# Patient Record
Sex: FEMALE | Race: WHITE | NOT HISPANIC OR LATINO | Employment: OTHER | URBAN - METROPOLITAN AREA
[De-identification: names, ages, dates, MRNs, and addresses within clinical notes are randomized per-mention and may not be internally consistent; named-entity substitution may affect disease eponyms.]

---

## 2017-03-31 ENCOUNTER — OFFICE VISIT (OUTPATIENT)
Dept: FAMILY MEDICINE | Facility: CLINIC | Age: 62
End: 2017-03-31
Payer: COMMERCIAL

## 2017-03-31 VITALS
DIASTOLIC BLOOD PRESSURE: 68 MMHG | WEIGHT: 165 LBS | HEART RATE: 87 BPM | HEIGHT: 63 IN | SYSTOLIC BLOOD PRESSURE: 124 MMHG | RESPIRATION RATE: 18 BRPM | TEMPERATURE: 98.4 F | BODY MASS INDEX: 29.23 KG/M2

## 2017-03-31 DIAGNOSIS — G89.29 CHRONIC RIGHT SHOULDER PAIN: Primary | ICD-10-CM

## 2017-03-31 DIAGNOSIS — E78.5 HYPERLIPIDEMIA LDL GOAL <130: ICD-10-CM

## 2017-03-31 DIAGNOSIS — M25.511 CHRONIC RIGHT SHOULDER PAIN: Primary | ICD-10-CM

## 2017-03-31 PROCEDURE — 99213 OFFICE O/P EST LOW 20 MIN: CPT | Performed by: PHYSICIAN ASSISTANT

## 2017-03-31 NOTE — PROGRESS NOTES
SUBJECTIVE:                                                    Nu Taylor is a 61 year old female who presents to clinic today for the following health issues:      Joint Pain     Onset: years    Description:   Location: rt shoulder  Character: ache    Intensity: mild    Progression of Symptoms: worse past 6 months    Accompanying Signs & Symptoms:  Other symptoms: radiation of pain to arm and fingers after using arm, tingling in arm   History:   Previous similar pain: no       Precipitating factors:   Trauma or overuse: YES-overuse-typing; patient jumped off fence grabbed fence with arm couple years ago    Alleviating factors:  Improved by: heat       Therapies Tried and outcome: tylenol      Problem list and histories reviewed & adjusted, as indicated.  Additional history: as documented    Patient Active Problem List   Diagnosis     Actinic keratosis     Essential hypertension     Other abnormal heart sounds     Hallux rigidus     Pain in joint, shoulder region     Hyperlipidemia LDL goal <130     Advanced directives, counseling/discussion     HTN, goal below 140/90     Mitral valve stenosis, mild     Urinary frequency     DDD (degenerative disc disease), cervical     Seasonal allergic rhinitis     Trigeminal neuralgia     Past Surgical History:   Procedure Laterality Date     C LIGATION,FALLOPIAN TUBE W/       LAPAROSCOPIC SUSPENSION BLADDER NECK         Social History   Substance Use Topics     Smoking status: Never Smoker     Smokeless tobacco: Never Used     Alcohol use 0.0 oz/week     0 Standard drinks or equivalent per week      Comment: occational drink     Family History   Problem Relation Age of Onset     Family History Negative No family hx of      C.A.D. No family hx of      Cancer - colorectal No family hx of      DIABETES Son      type 1         Current Outpatient Prescriptions   Medication Sig Dispense Refill     OXcarbazepine (TRILEPTAL) 300 MG tablet Take 1 tablet (300 mg) by mouth 4  "times daily 90 tablet 3     amLODIPine-benazepril (LOTREL) 5-20 MG per capsule Take 1 capsule by mouth 2 times daily 180 capsule 3     simvastatin (ZOCOR) 20 MG tablet Take 1 tablet (20 mg) by mouth every evening 90 tablet 3     Multiple Vitamins-Minerals (CENTRUM PO)        Allergies   Allergen Reactions     No Known Drug Allergies      BP Readings from Last 3 Encounters:   03/31/17 124/68   09/08/16 125/71   04/11/16 134/80    Wt Readings from Last 3 Encounters:   03/31/17 165 lb (74.8 kg)   09/08/16 162 lb (73.5 kg)   04/11/16 162 lb 6.4 oz (73.7 kg)                    Reviewed and updated as needed this visit by clinical staff  Tobacco  Allergies  Meds  Problems       Reviewed and updated as needed this visit by Provider  Allergies  Meds  Problems         ROS:  Constitutional, msk, neuro, cardiovascular, pulmonary systems are negative, except as otherwise noted.    OBJECTIVE:                                                    /68 (BP Location: Right arm, Patient Position: Chair, Cuff Size: Adult Regular)  Pulse 87  Temp 98.4  F (36.9  C) (Oral)  Resp 18  Ht 5' 3\" (1.6 m)  Wt 165 lb (74.8 kg)  LMP 10/22/2008  BMI 29.23 kg/m2  Body mass index is 29.23 kg/(m^2).  GENERAL APPEARANCE: healthy, alert and no distress  ORTHO:   SHOULDER Exam-Right   Inspection: no swelling, no bruising, no discoloration, no obvious deformity, no asymmetry, no glenohumeral joint anterior bulge, no distal clavicle elevation, no muscle atrophy, no scapular winging   Tenderness of: SC joint- no , clavicle(prox-mid)- no , clavicle-(mid-distal)- no , AC joint- no , acromion- no , anterior capsule- YES, prox bicep tendon- YES, greater tuberosity- no , prox humerus- no , supraspinatous- no , infraspinatous- no , superior trapezious- no , rhomboids- no    Range of Motion: Active- forward flexion- 120 degrees and painful, abduction- 100 degrees and painful, external rotation- normal, internal rotation- normal  Range of Motion: " Passive- forward flexion- normal, abduction- normal, external rotation- normal, internal rotation- normal   Strength: forward flexion- 5/5, painful, abduction- 5/5, painful, internal rotation- 5/5, external rotation- 4/5 and bicep- full   Special tests: Neers- negative, Ingram(supraspinatous)- negative, cross arm adduction- negative, Speeds- negative, lift off-negative, empty can-negative.         PSYCH: mentation appears normal and affect normal/bright    Diagnostic Test Results:  none      ASSESSMENT/PLAN:                                                      (M25.511,  G89.29) Chronic right shoulder pain  (primary encounter diagnosis)  Comment: has been present for years. Was at one time thought to be radicular from cervical region. MRI was obtained showing DDD. Patient's main complaint however is her shoulder. No imaging has been done of shoulder in past via MRI and with worsening symptoms after jumping a fence, a chronic rotator cuff tear vs nerve impingement in shoulder is possible though strengths are normal. Given chronic course, will obtain MRI of shoulder. Of note chronic impingement syndrome vs tendonitis is also possible. Pending MRI findings, joint injection may be helpful as well.   Plan: MR Shoulder Right w/o Contrast              Follow up: pending MRI results.     Kavon Renee PA-C  [facility]

## 2017-03-31 NOTE — MR AVS SNAPSHOT
After Visit Summary   3/31/2017    Nu Taylor    MRN: [id number]           Patient Information     Date Of Birth          1955        Visit Information        Provider Department      3/31/2017 2:15 PM Kavon Renee PA-C [facility]        Today's Diagnoses     Chronic right shoulder pain    -  1      Care Instructions      Understanding Rotator Cuff Injuries  The rotator cuff is a team of muscles and connecting tendons in the shoulder. It attaches your upper arm to your shoulder blade. Your rotator cuff helps you reach, throw, push, pull, and lift. Without it, your shoulder can't do its job properly.        Overuse tendonitis is irritation, bruising, or fraying of the rotator cuff.       A healthy rotator cuff  A healthy rotator cuff gives your shoulder flexibility and control. The rotator cuff holds your upper arm bone (humerus) in your shoulder socket (glenoid). It also helps move the shoulder.  A damaged rotator cuff  Pain and weakness told you that something was wrong with your shoulder. Now you know it s a rotator cuff problem. Rotator cuff tendons can become damaged or inflamed. This is called tendonitis. Possible causes include:    Irritation from overuse    Bursal inflammation (bursitis)    Pinching (impingement)    Calcium deposits (calcification)    Tears in the tendon.  Getting your shoulder healthy again  Care for your shoulder will most likely begin with nonsurgical treatments. You may start with simple rest. If needed, you may have injections that decrease inflammation and pain. Your healthcare provider will tell you how often you may need these treatments. If rest and injections relieve your pain, you will be given an exercise program. This will help restore your shoulder s strength and function. If your pain continues, your healthcare provider may suggest surgery to repair the rotator cuff.    6469-5221 [employer]. [address] [place] [address]  Road, [place] [address]. All rights reserved. This information is not intended as a substitute for professional medical care. Always follow your healthcare professional's instructions.        Shoulder Impingement Syndrome  The rotator cuff is a group of muscles and tendons that surround the shoulder joint. These muscles and tendons hold the arm in its joint. They help the shoulder turn. The rotator cuff muscles and tendons can become irritated from repeated rubbing against the shoulder bone. This is called shoulder impingement syndrome or rotator cuff tendonitis.    If your case is mild, you may only need to rest the shoulder and then do certain exercises to strengthen the muscles. You can also take anti-inflammatory medicines. Steroid injections into the shoulder can ease inflammation. But you can have only a limited number of these. If the condition gets worse, your shoulder muscles may become thin and weak. This can lead to a rotator cuff tear.  Symptoms of shoulder impingement syndrome may include:    Shoulder pain that gets worse when you raise your arm overhead    Weakness of the shoulder muscles when you use your arm overhead    Popping and clicking when you move your shoulder    Shoulder pain that wakes you up at night when you sleep on the affected shoulder    Sudden pain in your shoulder when you lift or reach up  Home Care  Follow these tips to take care of yourself at home:    Avoid activities that make your pain worse. These include raising your arms overhead, repeating the same motion over and over, or lifting heavy objects.    Don t hold your arm in one position for a long time. Keep it moving.    Put an ice pack on the sore area for 20 minutes every 1 to 2 hours for the first day. You can make an ice pack by putting ice cubes in a plastic bag. Wrap the bag in a towel before putting it on your shoulder. You should use the ice packs 3 to 4 times a day for the next 2 days. Continue using the ice to  relieve of pain and swelling.    You may take acetaminophen or ibuprofen to control pain, unless another medicine was prescribed. If prednisone was prescribed, don t take ibuprofen-type medicines. If you have chronic liver or kidney disease or ever had a stomach ulcer or GI bleeding, talk with your doctor before using these medicines.    After your symptoms ease, you may get physical therapy or start a home exercise program. This can strengthen your shoulder muscles and help your range of motion. Talk with your doctor about what is best for your condition.  Follow-up care  Follow up with your doctor.  When to seek medical care  Get prompt medical attention if any of these occur:    Shoulder pain that gets worse and wakes you up at night    Your shoulder or arm swells    Numbness, tingling, or pain that travels down the arm to the hand    Loss of shoulder strength       4491-0949 [employer]. [address] [place] [address]. All rights reserved. This information is not intended as a substitute for professional medical care. Always follow your healthcare professional's instructions.              Follow-ups after your visit        Future tests that were ordered for you today     Open Future Orders        Priority Expected Expires Ordered    MR Shoulder Right w/o Contrast Routine  3/31/2018 3/31/2017            Who to contact     If you have questions or need follow up information about today's clinic visit or your schedule please contact [facility] directly at [phone].  Normal or non-critical lab and imaging results will be communicated to you by MyChart, letter or phone within 4 business days after the clinic has received the results. If you do not hear from us within 7 days, please contact the clinic through MyChart or phone. If you have a critical or abnormal lab result, we will notify you by phone as soon as possible.  Submit refill requests through [employer] or call  "your pharmacy and they will forward the refill request to us. Please allow 3 business days for your refill to be completed.          Additional Information About Your Visit        [employer] Information     [employer] gives you secure access to your electronic health record. If you see a primary care provider, you can also send messages to your care team and make appointments. If you have questions, please call your primary care clinic.  If you do not have a primary care provider, please call [phone] and they will assist you.        Care EveryWhere ID     This is your Care EveryWhere ID. This could be used by other organizations to access your [facility] medical records  [id number]        Your Vitals Were     Pulse Temperature Respirations Height Last Period BMI (Body Mass Index)    87 98.4  F (36.9  C) (Oral) 18 5' 3\" (1.6 m) 10/22/2008 29.23 kg/m2       Blood Pressure from Last 3 Encounters:   03/31/17 124/68   09/08/16 125/71   04/11/16 134/80    Weight from Last 3 Encounters:   03/31/17 165 lb (74.8 kg)   09/08/16 162 lb (73.5 kg)   04/11/16 162 lb 6.4 oz (73.7 kg)               Primary Care Provider Office Phone # Fax #    Gloria Antonio [phone]       [facility] [address] [facility] [address]        Thank you!     Thank you for choosing [facility]  for your care. Our goal is always to provide you with excellent care. Hearing back from our patients is one way we can continue to improve our services. Please take a few minutes to complete the written survey that you may receive in the mail after your visit with us. Thank you!             Your Updated Medication List - Protect others around you: Learn how to safely use, store and throw away your medicines at www.disposemymeds.org.          This list is accurate as of: 3/31/17  3:21 PM.  Always use your most recent med list.                   Brand Name Dispense Instructions for use    amLODIPine-benazepril 5-20 " MG per capsule    LOTREL    180 capsule    Take 1 capsule by mouth 2 times daily       CENTRUM PO          simvastatin 20 MG tablet    ZOCOR    90 tablet    Take 1 tablet (20 mg) by mouth every evening       TRILEPTAL 300 MG tablet   Generic drug:  OXcarbazepine     90 tablet    Take 1 tablet (300 mg) by mouth 4 times daily

## 2017-03-31 NOTE — NURSING NOTE
"Chief Complaint   Patient presents with     Shoulder       Initial /68 (BP Location: Right arm, Patient Position: Chair, Cuff Size: Adult Regular)  Pulse 87  Temp 98.4  F (36.9  C) (Oral)  Resp 18  Ht 5' 3\" (1.6 m)  Wt 165 lb (74.8 kg)  LMP 10/22/2008  BMI 29.23 kg/m2 Estimated body mass index is 29.23 kg/(m^2) as calculated from the following:    Height as of this encounter: 5' 3\" (1.6 m).    Weight as of this encounter: 165 lb (74.8 kg).  Medication Reconciliation: complete    "

## 2017-03-31 NOTE — PATIENT INSTRUCTIONS
Understanding Rotator Cuff Injuries  The rotator cuff is a team of muscles and connecting tendons in the shoulder. It attaches your upper arm to your shoulder blade. Your rotator cuff helps you reach, throw, push, pull, and lift. Without it, your shoulder can't do its job properly.        Overuse tendonitis is irritation, bruising, or fraying of the rotator cuff.       A healthy rotator cuff  A healthy rotator cuff gives your shoulder flexibility and control. The rotator cuff holds your upper arm bone (humerus) in your shoulder socket (glenoid). It also helps move the shoulder.  A damaged rotator cuff  Pain and weakness told you that something was wrong with your shoulder. Now you know it s a rotator cuff problem. Rotator cuff tendons can become damaged or inflamed. This is called tendonitis. Possible causes include:    Irritation from overuse    Bursal inflammation (bursitis)    Pinching (impingement)    Calcium deposits (calcification)    Tears in the tendon.  Getting your shoulder healthy again  Care for your shoulder will most likely begin with nonsurgical treatments. You may start with simple rest. If needed, you may have injections that decrease inflammation and pain. Your healthcare provider will tell you how often you may need these treatments. If rest and injections relieve your pain, you will be given an exercise program. This will help restore your shoulder s strength and function. If your pain continues, your healthcare provider may suggest surgery to repair the rotator cuff.    2787-7162 [employer]. [address] [place] [address]. All rights reserved. This information is not intended as a substitute for professional medical care. Always follow your healthcare professional's instructions.        Shoulder Impingement Syndrome  The rotator cuff is a group of muscles and tendons that surround the shoulder joint. These muscles and tendons hold the arm in its joint. They help the  shoulder turn. The rotator cuff muscles and tendons can become irritated from repeated rubbing against the shoulder bone. This is called shoulder impingement syndrome or rotator cuff tendonitis.    If your case is mild, you may only need to rest the shoulder and then do certain exercises to strengthen the muscles. You can also take anti-inflammatory medicines. Steroid injections into the shoulder can ease inflammation. But you can have only a limited number of these. If the condition gets worse, your shoulder muscles may become thin and weak. This can lead to a rotator cuff tear.  Symptoms of shoulder impingement syndrome may include:    Shoulder pain that gets worse when you raise your arm overhead    Weakness of the shoulder muscles when you use your arm overhead    Popping and clicking when you move your shoulder    Shoulder pain that wakes you up at night when you sleep on the affected shoulder    Sudden pain in your shoulder when you lift or reach up  Home Care  Follow these tips to take care of yourself at home:    Avoid activities that make your pain worse. These include raising your arms overhead, repeating the same motion over and over, or lifting heavy objects.    Don t hold your arm in one position for a long time. Keep it moving.    Put an ice pack on the sore area for 20 minutes every 1 to 2 hours for the first day. You can make an ice pack by putting ice cubes in a plastic bag. Wrap the bag in a towel before putting it on your shoulder. You should use the ice packs 3 to 4 times a day for the next 2 days. Continue using the ice to relieve of pain and swelling.    You may take acetaminophen or ibuprofen to control pain, unless another medicine was prescribed. If prednisone was prescribed, don t take ibuprofen-type medicines. If you have chronic liver or kidney disease or ever had a stomach ulcer or GI bleeding, talk with your doctor before using these medicines.    After your symptoms ease, you may get  physical therapy or start a home exercise program. This can strengthen your shoulder muscles and help your range of motion. Talk with your doctor about what is best for your condition.  Follow-up care  Follow up with your doctor.  When to seek medical care  Get prompt medical attention if any of these occur:    Shoulder pain that gets worse and wakes you up at night    Your shoulder or arm swells    Numbness, tingling, or pain that travels down the arm to the hand    Loss of shoulder strength       8914-7201 [employer]. [address] [place] [address]. All rights reserved. This information is not intended as a substitute for professional medical care. Always follow your healthcare professional's instructions.

## 2017-04-03 RX ORDER — SIMVASTATIN 20 MG
TABLET ORAL
Qty: 90 TABLET | Refills: 0 | Status: SHIPPED | OUTPATIENT
Start: 2017-04-03 | End: 2017-07-01

## 2017-04-06 ENCOUNTER — HOSPITAL ENCOUNTER (OUTPATIENT)
Dept: MRI IMAGING | Facility: CLINIC | Age: 62
Discharge: HOME | End: 2017-04-06
Attending: PHYSICIAN ASSISTANT | Admitting: PHYSICIAN ASSISTANT
Payer: COMMERCIAL

## 2017-04-06 DIAGNOSIS — G89.29 CHRONIC RIGHT SHOULDER PAIN: ICD-10-CM

## 2017-04-06 DIAGNOSIS — M25.511 CHRONIC RIGHT SHOULDER PAIN: ICD-10-CM

## 2017-04-06 PROCEDURE — 73221 MRI JOINT UPR EXTREM W/O DYE: CPT | Mod: RT

## 2017-04-07 DIAGNOSIS — G50.0 TRIGEMINAL NEURALGIA: ICD-10-CM

## 2017-04-07 DIAGNOSIS — M75.121 COMPLETE TEAR OF RIGHT ROTATOR CUFF: Primary | ICD-10-CM

## 2017-04-08 NOTE — TELEPHONE ENCOUNTER
OXcarbazepine (TRILEPTAL) 300 MG tablet  Sig: Take 1 tablet (300 mg) by mouth 4 times daily    Last Written Prescription Date: 9/8/16  Last Fill Quantity: 90,  # refills: 3   Last Office Visit with FMHOWIE, JASMIN or [facility] prescribing provider:  3/31/2017                                              JAYCOB Mann  April 8, 2017  9:18 AM

## 2017-04-10 NOTE — TELEPHONE ENCOUNTER
Not sure why is she on these medications? And who is folllowing up with her about these medications?  Gloria Antonio MD  [facility]  [phone]  r

## 2017-04-14 ENCOUNTER — TELEPHONE (OUTPATIENT)
Dept: FAMILY MEDICINE | Facility: CLINIC | Age: 62
End: 2017-04-14

## 2017-04-14 RX ORDER — OXCARBAZEPINE 300 MG/1
TABLET, FILM COATED ORAL
Qty: 90 TABLET | Refills: 0 | Status: SHIPPED | OUTPATIENT
Start: 2017-04-14 | End: 2018-07-31

## 2017-04-14 NOTE — TELEPHONE ENCOUNTER
Pt calls, discussed recent MRI, discussed Mychart, email address entered incorrectly, update, sent test response, pt will call ortho now, controlling pain with inactivity, heat/cold and tylenol, will call if problem getting ortho appointment, [facility], pt has appointment Monday  Mildred Huang RN, BSN  Message handled by Nurse Triage.

## 2017-04-14 NOTE — TELEPHONE ENCOUNTER
Pt calls, takes for trigeminal neuralgia, [facility] has refilled, see past refills, confused about [facility] response as thought he was, routed back to [facility], please advise refill, inform pt when final    Mildred Huang RN, BSN  Message handled by Nurse Triage.

## 2017-04-17 ENCOUNTER — OFFICE VISIT (OUTPATIENT)
Dept: ORTHOPEDICS | Facility: CLINIC | Age: 62
End: 2017-04-17
Payer: COMMERCIAL

## 2017-04-17 VITALS
SYSTOLIC BLOOD PRESSURE: 120 MMHG | HEIGHT: 63 IN | DIASTOLIC BLOOD PRESSURE: 74 MMHG | WEIGHT: 165 LBS | BODY MASS INDEX: 29.23 KG/M2

## 2017-04-17 DIAGNOSIS — M75.101 ROTATOR CUFF TEAR ARTHROPATHY, RIGHT: Primary | ICD-10-CM

## 2017-04-17 DIAGNOSIS — M12.811 ROTATOR CUFF TEAR ARTHROPATHY, RIGHT: Primary | ICD-10-CM

## 2017-04-17 PROCEDURE — 99204 OFFICE O/P NEW MOD 45 MIN: CPT | Performed by: ORTHOPAEDIC SURGERY

## 2017-04-17 NOTE — PROGRESS NOTES
HISTORY OF PRESENT ILLNESS:    Nu Taylor is a 61 year old female who is seen in consultation at the request of Jens Renee PA-C for right shoulder pain    Present symptoms: Pt states shoulder pain has been present for about 3 months, pt states about a year ago jumped a 4 ft fence and grabbed the fence with her right arm, started developing pain in the shoulder at that time.  Pt states she has burning pain in the shoulder, will feel hot to touch.  Pt states keyboarding and using a mouse will increase pain. Activities such as writing and cutting people's hair have increased her pain levelPt states pain is over the anterior shoulder and down into the upper arm.  Pt states side lying has started to cause increased pain.  She is right-hand dominant.  Her pain is mostly in the superior aspect and sometimes goes into the trapezius and her neck.  No specific radiculopathy symptoms otherwise.    Treatments tried to this point: ice, heat, tylenol, MRI, rest  Orthopedic PMH: no ortho surgeries     Past Medical History:   Diagnosis Date     Allergic rhinitis due to other allergen      HTN (hypertension)      Hyperlipidemia LDL goal <130 2008       Past Surgical History:   Procedure Laterality Date     C LIGATION,FALLOPIAN TUBE W/       LAPAROSCOPIC SUSPENSION BLADDER NECK         Family History   Problem Relation Age of Onset     Family History Negative No family hx of      C.A.D. No family hx of      Cancer - colorectal No family hx of      DIABETES Son      type 1       Social History     Social History     Marital status:      Spouse name: Thomas     Number of children: 4     Years of education: N/A     Occupational History     bus  [employer]     Social History Main Topics     Smoking status: Never Smoker     Smokeless tobacco: Never Used     Alcohol use 0.0 oz/week     0 Standard drinks or equivalent per week      Comment: occational drink     Drug use: No      "Sexual activity: Yes     Partners: Male     Other Topics Concern      Service No     Blood Transfusions No     Caffeine Concern No     Occupational Exposure No     Hobby Hazards No     Sleep Concern Yes     Stress Concern Yes     Weight Concern No     Special Diet Yes     low sodium     Back Care No     Exercise Yes     2 x per week     Bike Helmet Yes     Seat Belt Yes     Self-Exams Yes     Social History Narrative       Current Outpatient Prescriptions   Medication Sig Dispense Refill     OXcarbazepine (TRILEPTAL) 300 MG tablet TAKE ONE TABLET BY MOUTH THREE TIMES DAILY 90 tablet 0     simvastatin (ZOCOR) 20 MG tablet TAKE 1 TABLET BY MOUTH ONCE DAILY IN THE EVENING 90 tablet 0     OXcarbazepine (TRILEPTAL) 300 MG tablet Take 1 tablet (300 mg) by mouth 4 times daily 90 tablet 3     amLODIPine-benazepril (LOTREL) 5-20 MG per capsule Take 1 capsule by mouth 2 times daily 180 capsule 3     Multiple Vitamins-Minerals (CENTRUM PO)          Allergies   Allergen Reactions     No Known Drug Allergies        REVIEW OF SYSTEMS:  CONSTITUTIONAL:  NEGATIVE for fever, chills, change in weight  INTEGUMENTARY/SKIN:  Eczema. NEGATIVE for worrisome rashes, moles or lesions  EYES:  NEGATIVE for vision changes or irritation  ENT/MOUTH:  NEGATIVE for ear, mouth and throat problems  RESP:  NEGATIVE for significant cough or SOB  BREAST:  NEGATIVE for masses, tenderness or discharge  CV:  Hypertension. NEGATIVE for chest pain, palpitations or peripheral edema  GI:  NEGATIVE for nausea, abdominal pain, heartburn, or change in bowel habits  :  Urinary frequency  MUSCULOSKELETAL:  See HPI above  NEURO:  NEGATIVE for weakness, dizziness or paresthesias  ENDOCRINE:  NEGATIVE for temperature intolerance, skin/hair changes  HEME/ALLERGY/IMMUNE:  NEGATIVE for bleeding problems  PSYCHIATRIC:  NEGATIVE for changes in mood or affect      PHYSICAL EXAM:  Ht 5' 3\" (1.6 m)  Wt 165 lb (74.8 kg)  LMP 10/22/2008  BMI 29.23 kg/m2  Body mass " index is 29.23 kg/(m^2).   GENERAL APPEARANCE: healthy, alert and no distress   HEENT: No apparent thyroid megaly. Clear sclera with normal ocular movement  RESPIRATORY: No labored breathing  SKIN: no suspicious lesions or rashes  NEURO: Normal strength and tone, mentation intact and speech normal  VASCULAR: Good pulses, and capillary refill   LYMPH: no lymphadenopathy   PSYCH:  mentation appears normal and affect normal/bright    MUSCULOSKELETAL:  Normal gait  Normal neck movement  Full active range of motion in all directions  Positive impingement sign, right;  negative on the left  Intact internal rotation, right  Negative belly press  Significant weakness of external rotation against resistance, right  Flexion and extension of the elbow is intact in terms of strength  No noticeable crepitus  Distal neurovascular status is intact     ASSESSMENT:  Chronic right shoulder rotator cuff tear arthropathy        PLAN:  We visualized images of MRI scan from April 6, 2017 and findings were thoroughly explained.  The nature of rotator cuff tear arthropathy and the treatment options of further observation, physical therapy, cortisone injection as well as possible reverse total shoulder arthroplasty were thoroughly explained.  Potential benefits as well as risks of the surgery were explained.  Given the fact that her pain is not severe and not constant, no immediate surgical intervention was felt to be needed. However, she understands that ultimate decision to proceed with reverse total shoulder arthroplasty would be up to her.  She concern he try physical therapy and intermittent cortisone injections before she makes up her mind about surgery.  Written information regarding rotator cuff tear arthropathy was provided today      Imaging Interpretation:     Recent Results (from the past 744 hour(s))   MR Shoulder Right w/o Contrast    Narrative    MRI RIGHT SHOULDER WITHOUT CONTRAST April 6, 2017 7:10 PM    HISTORY: Two months  of increasing right shoulder pain.    COMPARISON: Radiographs on 7/3/2013.    TECHNIQUE: Coronal T1, T2, and T2 fat-suppressed, sagittal T2, and  transverse proton density and T2-weighted images were obtained through  the right shoulder.    FINDINGS:    Osseous acromion outlet: There is a type II configuration of the  acromion with no significant lateral and moderate anterior  downsloping. There are mild degenerative changes of the  acromioclavicular joint. The acromion downsloping results in  moderately prominent loss of space between the humeral head and the  acromion. No os acromiale.    Rotator cuff: There is a complete full-thickness tear of the entire  distal infraspinatus tendon as well as a large portion of the adjacent  supraspinatus tendon. These are torn at their insertion into the  greater tuberosity. The infraspinatus tendon is retracted to the level  of the glenoid while the torn portion of the supraspinatus extends to  the apex of the humeral head. The subscapularis and teres minor  muscles and tendons are intact and unremarkable. There appears to be  fatty atrophy of the infraspinatus muscle.     Labrum: No tear or other labral pathology is demonstrated.    Biceps tendon: The biceps tendon is in the bicipital groove. It is  intact and demonstrates normal signal.    Osseous structures and cartilaginous surfaces: No fracture or osseous  lesion is seen. No abnormal marrow signal intensity is identified.  There are moderate degenerative changes of the glenohumeral  articulation including predominately grade 2 chondromalacia throughout  the majority of the joint space with some grade 3 chondromalacia  superiorly. There is also mild superior subluxation of the humeral  head secondary to the rotator cuff tear.    Additional findings: No joint effusion is demonstrated. There is a  small amount of fluid in the subacromial space. The adjacent soft  tissues are unremarkable.      Impression    IMPRESSION:  1.  Large full-thickness tears of the entire distal infraspinatus  tendon and a large portion of the adjacent supraspinatus tendon. There  is fatty atrophy of the infraspinatus muscle.  2. Moderate degenerative changes of the glenohumeral joint and mild  degenerative changes of the acromioclavicular joint.    MD Elías BROWN MD  Department of Orthopedic Surgery        Disclaimer: This note consists of symbols derived from keyboarding, dictation and/or voice recognition software. As a result, there may be errors in the script that have gone undetected. Please consider this when interpreting information found in this chart.

## 2017-04-17 NOTE — MR AVS SNAPSHOT
"              After Visit Summary   4/17/2017    Nu Taylor    MRN: [id number]           Patient Information     Date Of Birth          1955        Visit Information        Provider Department      4/17/2017 1:00 PM Elías Clements MD [facility]        Today's Diagnoses     Rotator cuff tear arthropathy, right    -  1       Follow-ups after your visit        Who to contact     If you have questions or need follow up information about today's clinic visit or your schedule please contact [facility] directly at [phone].  Normal or non-critical lab and imaging results will be communicated to you by [employer], letter or phone within 4 business days after the clinic has received the results. If you do not hear from us within 7 days, please contact the clinic through Meet[employer] or phone. If you have a critical or abnormal lab result, we will notify you by phone as soon as possible.  Submit refill requests through [employer] or call your pharmacy and they will forward the refill request to us. Please allow 3 business days for your refill to be completed.          Additional Information About Your Visit        MyChart Information     [employer] gives you secure access to your electronic health record. If you see a primary care provider, you can also send messages to your care team and make appointments. If you have questions, please call your primary care clinic.  If you do not have a primary care provider, please call [phone] and they will assist you.        Care EveryWhere ID     This is your Care EveryWhere ID. This could be used by other organizations to access your [place] medical records  [id number]        Your Vitals Were     Height Last Period BMI (Body Mass Index)             5' 3\" (1.6 m) 10/22/2008 29.23 kg/m2          Blood Pressure from Last 3 Encounters:   04/17/17 120/74   03/31/17 124/68   09/08/16 125/71    Weight from Last 3 Encounters:   04/17/17 " 165 lb (74.8 kg)   03/31/17 165 lb (74.8 kg)   09/08/16 162 lb (73.5 kg)              Today, you had the following     No orders found for display       Primary Care Provider Office Phone # Fax #    Gloria Antonio [phone]       [facility] [address] [address] [facility] [address]        Thank you!     Thank you for choosing [facility]  for your care. Our goal is always to provide you with excellent care. Hearing back from our patients is one way we can continue to improve our services. Please take a few minutes to complete the written survey that you may receive in the mail after your visit with us. Thank you!             Your Updated Medication List - Protect others around you: Learn how to safely use, store and throw away your medicines at www.disposemymeds.org.          This list is accurate as of: 4/17/17  1:45 PM.  Always use your most recent med list.                   Brand Name Dispense Instructions for use    amLODIPine-benazepril 5-20 MG per capsule    LOTREL    180 capsule    Take 1 capsule by mouth 2 times daily       CENTRUM PO          simvastatin 20 MG tablet    ZOCOR    90 tablet    TAKE 1 TABLET BY MOUTH ONCE DAILY IN THE EVENING       * TRILEPTAL 300 MG tablet   Generic drug:  OXcarbazepine     90 tablet    Take 1 tablet (300 mg) by mouth 4 times daily       * OXcarbazepine 300 MG tablet    TRILEPTAL    90 tablet    TAKE ONE TABLET BY MOUTH THREE TIMES DAILY       * Notice:  This list has 2 medication(s) that are the same as other medications prescribed for you. Read the directions carefully, and ask your doctor or other care provider to review them with you.

## 2017-04-17 NOTE — LETTER
2017       RE: Nu Taylor  [address] [facility] [address]           Dear Colleague,    Thank you for referring your patient, Nu Taylor, to the [facility]. Please see a copy of my visit note below.    HISTORY OF PRESENT ILLNESS:    Nu Taylor is a 61 year old female who is seen in consultation at the request of eJns Renee PA-C for right shoulder pain    Present symptoms: Pt states shoulder pain has been present for about 3 months, pt states about a year ago jumped a 4 ft fence and grabbed the fence with her right arm, started developing pain in the shoulder at that time.  Pt states she has burning pain in the shoulder, will feel hot to touch.  Pt states keyboarding and using a mouse will increase pain. Activities such as writing and cutting people's hair have increased her pain levelPt states pain is over the anterior shoulder and down into the upper arm.  Pt states side lying has started to cause increased pain.  She is right-hand dominant.  Her pain is mostly in the superior aspect and sometimes goes into the trapezius and her neck.  No specific radiculopathy symptoms otherwise.    Treatments tried to this point: ice, heat, tylenol, MRI, rest  Orthopedic PMH: no ortho surgeries     Past Medical History:   Diagnosis Date     Allergic rhinitis due to other allergen      HTN (hypertension)      Hyperlipidemia LDL goal <130 2008       Past Surgical History:   Procedure Laterality Date     C LIGATION,FALLOPIAN TUBE W/       LAPAROSCOPIC SUSPENSION BLADDER NECK         Family History   Problem Relation Age of Onset     Family History Negative No family hx of      C.A.D. No family hx of      Cancer - colorectal No family hx of      DIABETES Son      type 1       Social History     Social History     Marital status:      Spouse name: Thomas     Number of children: 4     Years of education: N/A     Occupational History     bus  [employer]     Social History Main Topics     Smoking status: Never Smoker     Smokeless tobacco: Never Used     Alcohol use 0.0 oz/week     0 Standard drinks or equivalent per week      Comment: occational drink     Drug use: No     Sexual activity: Yes     Partners: Male     Other Topics Concern      Service No     Blood Transfusions No     Caffeine Concern No     Occupational Exposure No     Hobby Hazards No     Sleep Concern Yes     Stress Concern Yes     Weight Concern No     Special Diet Yes     low sodium     Back Care No     Exercise Yes     2 x per week     Bike Helmet Yes     Seat Belt Yes     Self-Exams Yes     Social History Narrative       Current Outpatient Prescriptions   Medication Sig Dispense Refill     OXcarbazepine (TRILEPTAL) 300 MG tablet TAKE ONE TABLET BY MOUTH THREE TIMES DAILY 90 tablet 0     simvastatin (ZOCOR) 20 MG tablet TAKE 1 TABLET BY MOUTH ONCE DAILY IN THE EVENING 90 tablet 0     OXcarbazepine (TRILEPTAL) 300 MG tablet Take 1 tablet (300 mg) by mouth 4 times daily 90 tablet 3     amLODIPine-benazepril (LOTREL) 5-20 MG per capsule Take 1 capsule by mouth 2 times daily 180 capsule 3     Multiple Vitamins-Minerals (CENTRUM PO)          Allergies   Allergen Reactions     No Known Drug Allergies        REVIEW OF SYSTEMS:  CONSTITUTIONAL:  NEGATIVE for fever, chills, change in weight  INTEGUMENTARY/SKIN:  Eczema. NEGATIVE for worrisome rashes, moles or lesions  EYES:  NEGATIVE for vision changes or irritation  ENT/MOUTH:  NEGATIVE for ear, mouth and throat problems  RESP:  NEGATIVE for significant cough or SOB  BREAST:  NEGATIVE for masses, tenderness or discharge  CV:  Hypertension. NEGATIVE for chest pain, palpitations or peripheral edema  GI:  NEGATIVE for nausea, abdominal pain, heartburn, or change in bowel habits  :  Urinary frequency  MUSCULOSKELETAL:  See HPI above  NEURO:  NEGATIVE for weakness, dizziness or paresthesias  ENDOCRINE:  NEGATIVE for  "temperature intolerance, skin/hair changes  HEME/ALLERGY/IMMUNE:  NEGATIVE for bleeding problems  PSYCHIATRIC:  NEGATIVE for changes in mood or affect      PHYSICAL EXAM:  Ht 5' 3\" (1.6 m)  Wt 165 lb (74.8 kg)  LMP 10/22/2008  BMI 29.23 kg/m2  Body mass index is 29.23 kg/(m^2).   GENERAL APPEARANCE: healthy, alert and no distress   HEENT: No apparent thyroid megaly. Clear sclera with normal ocular movement  RESPIRATORY: No labored breathing  SKIN: no suspicious lesions or rashes  NEURO: Normal strength and tone, mentation intact and speech normal  VASCULAR: Good pulses, and capillary refill   LYMPH: no lymphadenopathy   PSYCH:  mentation appears normal and affect normal/bright    MUSCULOSKELETAL:  Normal gait  Normal neck movement  Full active range of motion in all directions  Positive impingement sign, right;  negative on the left  Intact internal rotation, right  Negative belly press  Significant weakness of external rotation against resistance, right  Flexion and extension of the elbow is intact in terms of strength  No noticeable crepitus  Distal neurovascular status is intact     ASSESSMENT:  Chronic right shoulder rotator cuff tear arthropathy        PLAN:  We visualized images of MRI scan from April 6, 2017 and findings were thoroughly explained.  The nature of rotator cuff tear arthropathy and the treatment options of further observation, physical therapy, cortisone injection as well as possible reverse total shoulder arthroplasty were thoroughly explained.  Potential benefits as well as risks of the surgery were explained.  Given the fact that her pain is not severe and not constant, no immediate surgical intervention was felt to be needed. However, she understands that ultimate decision to proceed with reverse total shoulder arthroplasty would be up to her.  She concern he try physical therapy and intermittent cortisone injections before she makes up her mind about surgery.  Written information " regarding rotator cuff tear arthropathy was provided today      Imaging Interpretation:     Recent Results (from the past 744 hour(s))   MR Shoulder Right w/o Contrast    Narrative    MRI RIGHT SHOULDER WITHOUT CONTRAST April 6, 2017 7:10 PM    HISTORY: Two months of increasing right shoulder pain.    COMPARISON: Radiographs on 7/3/2013.    TECHNIQUE: Coronal T1, T2, and T2 fat-suppressed, sagittal T2, and  transverse proton density and T2-weighted images were obtained through  the right shoulder.    FINDINGS:    Osseous acromion outlet: There is a type II configuration of the  acromion with no significant lateral and moderate anterior  downsloping. There are mild degenerative changes of the  acromioclavicular joint. The acromion downsloping results in  moderately prominent loss of space between the humeral head and the  acromion. No os acromiale.    Rotator cuff: There is a complete full-thickness tear of the entire  distal infraspinatus tendon as well as a large portion of the adjacent  supraspinatus tendon. These are torn at their insertion into the  greater tuberosity. The infraspinatus tendon is retracted to the level  of the glenoid while the torn portion of the supraspinatus extends to  the apex of the humeral head. The subscapularis and teres minor  muscles and tendons are intact and unremarkable. There appears to be  fatty atrophy of the infraspinatus muscle.     Labrum: No tear or other labral pathology is demonstrated.    Biceps tendon: The biceps tendon is in the bicipital groove. It is  intact and demonstrates normal signal.    Osseous structures and cartilaginous surfaces: No fracture or osseous  lesion is seen. No abnormal marrow signal intensity is identified.  There are moderate degenerative changes of the glenohumeral  articulation including predominately grade 2 chondromalacia throughout  the majority of the joint space with some grade 3 chondromalacia  superiorly. There is also mild superior  subluxation of the humeral  head secondary to the rotator cuff tear.    Additional findings: No joint effusion is demonstrated. There is a  small amount of fluid in the subacromial space. The adjacent soft  tissues are unremarkable.      Impression    IMPRESSION:  1. Large full-thickness tears of the entire distal infraspinatus  tendon and a large portion of the adjacent supraspinatus tendon. There  is fatty atrophy of the infraspinatus muscle.  2. Moderate degenerative changes of the glenohumeral joint and mild  degenerative changes of the acromioclavicular joint.    MD Elías BROWN MD  Department of Orthopedic Surgery        Disclaimer: This note consists of symbols derived from keyboarding, dictation and/or voice recognition software. As a result, there may be errors in the script that have gone undetected. Please consider this when interpreting information found in this chart.      Again, thank you for allowing me to participate in the care of your patient.        Sincerely,              Elías Clements MD

## 2017-04-17 NOTE — NURSING NOTE
"Chief Complaint   Patient presents with     Shoulder Pain     Right shoulder       Initial /74 (BP Location: Right arm, Patient Position: Chair, Cuff Size: Adult Regular)  Ht 5' 3\" (1.6 m)  Wt 165 lb (74.8 kg)  LMP 10/22/2008  BMI 29.23 kg/m2 Estimated body mass index is 29.23 kg/(m^2) as calculated from the following:    Height as of this encounter: 5' 3\" (1.6 m).    Weight as of this encounter: 165 lb (74.8 kg).  Medication Reconciliation: complete    "

## 2017-05-02 DIAGNOSIS — E78.5 HYPERLIPIDEMIA LDL GOAL <130: Primary | ICD-10-CM

## 2017-05-02 DIAGNOSIS — I10 ESSENTIAL HYPERTENSION: ICD-10-CM

## 2017-05-02 DIAGNOSIS — I10 HTN, GOAL BELOW 140/90: ICD-10-CM

## 2017-05-02 NOTE — LETTER
[address] [address]  Phone: [phone]    05/04/17    Nu Taylor  [address] [address] [place] [id number]    Dear Nu:     We recently received a call from your pharmacy requesting a refill of your medication.    A review of your chart indicates that an appointment is required with your provider for follow up exam and labs. Please call the clinic at [phone] to schedule your appointment.    We have authorized one refill of your medication to allow time for you to schedule your appointment.    Taking care of your health is important to us, and ongoing visits with your provider are vital to your care.  We look forward to seeing you in the near future.          Sincerely,      Gloria Antonio MD/Zara CHRISTOPHER RN

## 2017-05-03 NOTE — TELEPHONE ENCOUNTER
Amlodipine 5-20 mg       Last Written Prescription Date: 05/02/16  Last Fill Quantity: 180, # refills: 3    Last Office Visit with G, P or [facility] prescribing provider:  03/17/16 Dr. Antonio   Future Office Visit:        BP Readings from Last 3 Encounters:   04/17/17 120/74   03/31/17 124/68   09/08/16 125/71

## 2017-05-04 RX ORDER — AMLODIPINE AND BENAZEPRIL HYDROCHLORIDE 5; 20 MG/1; MG/1
1 CAPSULE ORAL 2 TIMES DAILY
Qty: 60 CAPSULE | Refills: 0 | Status: SHIPPED | OUTPATIENT
Start: 2017-05-04 | End: 2017-05-30

## 2017-05-04 NOTE — TELEPHONE ENCOUNTER
Medication is being filled for 1 time refill only due to:  Future labs ordered .. Patient needs to be seen because it has been more than one year since last visit. Letter sent to schedule appt and labs  Christine Simms RN, BS  Clinical Nurse Triage.

## 2017-05-30 ENCOUNTER — OFFICE VISIT (OUTPATIENT)
Dept: FAMILY MEDICINE | Facility: CLINIC | Age: 62
End: 2017-05-30
Payer: COMMERCIAL

## 2017-05-30 VITALS
HEART RATE: 86 BPM | RESPIRATION RATE: 16 BRPM | SYSTOLIC BLOOD PRESSURE: 118 MMHG | TEMPERATURE: 97.7 F | WEIGHT: 164 LBS | OXYGEN SATURATION: 96 % | DIASTOLIC BLOOD PRESSURE: 80 MMHG | BODY MASS INDEX: 29.05 KG/M2

## 2017-05-30 DIAGNOSIS — E78.5 HYPERLIPIDEMIA LDL GOAL <130: ICD-10-CM

## 2017-05-30 DIAGNOSIS — I10 HTN, GOAL BELOW 140/90: Primary | ICD-10-CM

## 2017-05-30 LAB
ERYTHROCYTE [DISTWIDTH] IN BLOOD BY AUTOMATED COUNT: 12 % (ref 10–15)
HCT VFR BLD AUTO: 38.6 % (ref 35–47)
HGB BLD-MCNC: 13.4 G/DL (ref 11.7–15.7)
MCH RBC QN AUTO: 31.5 PG (ref 26.5–33)
MCHC RBC AUTO-ENTMCNC: 34.7 G/DL (ref 31.5–36.5)
MCV RBC AUTO: 91 FL (ref 78–100)
PLATELET # BLD AUTO: 333 10E9/L (ref 150–450)
RBC # BLD AUTO: 4.25 10E12/L (ref 3.8–5.2)
WBC # BLD AUTO: 7.2 10E9/L (ref 4–11)

## 2017-05-30 PROCEDURE — 80053 COMPREHEN METABOLIC PANEL: CPT | Performed by: FAMILY MEDICINE

## 2017-05-30 PROCEDURE — 80061 LIPID PANEL: CPT | Performed by: FAMILY MEDICINE

## 2017-05-30 PROCEDURE — 36415 COLL VENOUS BLD VENIPUNCTURE: CPT | Performed by: FAMILY MEDICINE

## 2017-05-30 PROCEDURE — 99213 OFFICE O/P EST LOW 20 MIN: CPT | Performed by: NURSE PRACTITIONER

## 2017-05-30 PROCEDURE — 85027 COMPLETE CBC AUTOMATED: CPT | Performed by: FAMILY MEDICINE

## 2017-05-30 RX ORDER — AMLODIPINE AND BENAZEPRIL HYDROCHLORIDE 5; 20 MG/1; MG/1
1 CAPSULE ORAL 2 TIMES DAILY
Qty: 180 CAPSULE | Refills: 1 | Status: SHIPPED | OUTPATIENT
Start: 2017-05-30 | End: 2017-12-03

## 2017-05-30 NOTE — MR AVS SNAPSHOT
After Visit Summary   5/30/2017    Nu Taylor    MRN: [id number]           Patient Information     Date Of Birth          1955        Visit Information        Provider Department      5/30/2017 3:00 PM Kimberlyn Grady APRN CNP [facility]        Today's Diagnoses     HTN, goal below 140/90    -  1    Hyperlipidemia LDL goal <130           Follow-ups after your visit        Follow-up notes from your care team     Return in about 6 months (around 11/30/2017).      Who to contact     If you have questions or need follow up information about today's clinic visit or your schedule please contact [facility] directly at [phone].  Normal or non-critical lab and imaging results will be communicated to you by MyChart, letter or phone within 4 business days after the clinic has received the results. If you do not hear from us within 7 days, please contact the clinic through [employer] or phone. If you have a critical or abnormal lab result, we will notify you by phone as soon as possible.  Submit refill requests through [employer] or call your pharmacy and they will forward the refill request to us. Please allow 3 business days for your refill to be completed.          Additional Information About Your Visit        MyChart Information     [employer] gives you secure access to your electronic health record. If you see a primary care provider, you can also send messages to your care team and make appointments. If you have questions, please call your primary care clinic.  If you do not have a primary care provider, please call [phone] and they will assist you.        Care EveryWhere ID     This is your Care EveryWhere ID. This could be used by other organizations to access your [facility] medical records  [id number]        Your Vitals Were     Pulse Temperature Respirations Last Period Pulse Oximetry BMI (Body Mass Index)    86 97.7  F (36.5  C) (Oral) 16 10/22/2008  96% 29.05 kg/m2       Blood Pressure from Last 3 Encounters:   05/30/17 118/80   04/17/17 120/74   03/31/17 124/68    Weight from Last 3 Encounters:   05/30/17 164 lb (74.4 kg)   04/17/17 165 lb (74.8 kg)   03/31/17 165 lb (74.8 kg)              We Performed the Following     **CBC with platelets FUTURE 1yr     **Comprehensive metabolic panel FUTURE 1yr     **Lipid panel reflex to direct LDL FUTURE 1yr          Where to get your medicines      These medications were sent to [facility] [address] [address] [facility]  [address] [address] [address]     Phone:  [phone]     amLODIPine-benazepril 5-20 MG per capsule          Primary Care Provider Office Phone # Fax #    Gloria Antonio [phone]       [facility] [address] [facility] [address] [facility] [address]        Equal Access to Services     GIN GOLD AH: Hadii aad ku hadasho Soomaali, waaxda luqadaha, qaybta kaalmada adeegyada, waxay idiin hayharryn sai pearce . So [place] [phone].    ATENCIÓN: Si habla español, tiene a العلي disposición servicios gratuitos de asistencia lingüística. [facility] [phone].    We comply with applicable federal civil rights laws and [place] laws. We do not discriminate on the basis of race, color, national origin, age, disability sex, sexual orientation or gender identity.            Thank you!     Thank you for choosing [facility]  for your care. Our goal is always to provide you with excellent care. Hearing back from our patients is one way we can continue to improve our services. Please take a few minutes to complete the written survey that you may receive in the mail after your visit with us. Thank you!             Your Updated Medication List - Protect others around you: Learn how to safely use, store and throw away your medicines at www.disposemymeds.org.          This list is accurate as of: 5/30/17 11:59 PM.  Always use your most recent med list.                    Brand Name Dispense Instructions for use Diagnosis    amLODIPine-benazepril 5-20 MG per capsule    LOTREL    180 capsule    Take 1 capsule by mouth 2 times daily    HTN, goal below 140/90       CENTRUM PO           OXcarbazepine 300 MG tablet    TRILEPTAL    90 tablet    TAKE ONE TABLET BY MOUTH THREE TIMES DAILY    Trigeminal neuralgia       simvastatin 20 MG tablet    ZOCOR    90 tablet    TAKE 1 TABLET BY MOUTH ONCE DAILY IN THE EVENING    Hyperlipidemia LDL goal <130

## 2017-05-30 NOTE — NURSING NOTE
"Chief Complaint   Patient presents with     Recheck Medication       Initial /80 (BP Location: Right arm, Cuff Size: Adult Regular)  Pulse 86  Temp 97.7  F (36.5  C) (Oral)  Resp 16  Wt 164 lb (74.4 kg)  LMP 10/22/2008  SpO2 96%  BMI 29.05 kg/m2 Estimated body mass index is 29.05 kg/(m^2) as calculated from the following:    Height as of 4/17/17: 5' 3\" (1.6 m).    Weight as of this encounter: 164 lb (74.4 kg).  Medication Reconciliation: complete   Cheli Can MA    "

## 2017-05-30 NOTE — PROGRESS NOTES
HPI    SUBJECTIVE:                                                    Nu Taylor is a 61 year old female who presents to clinic today for the following health issues:    Pt is here for HTN and Cholesterol medication refills.  She has been taking medication as directed.  No side effects to the medications.  She is on a regular diet, no added salt.       Problem list and histories reviewed & adjusted, as indicated.  Additional history: as documented    Current Outpatient Prescriptions   Medication Sig Dispense Refill     amLODIPine-benazepril (LOTREL) 5-20 MG per capsule Take 1 capsule by mouth 2 times daily 60 capsule 0     OXcarbazepine (TRILEPTAL) 300 MG tablet TAKE ONE TABLET BY MOUTH THREE TIMES DAILY 90 tablet 0     simvastatin (ZOCOR) 20 MG tablet TAKE 1 TABLET BY MOUTH ONCE DAILY IN THE EVENING 90 tablet 0     Multiple Vitamins-Minerals (CENTRUM PO)        [DISCONTINUED] OXcarbazepine (TRILEPTAL) 300 MG tablet Take 1 tablet (300 mg) by mouth 4 times daily 90 tablet 3     Allergies   Allergen Reactions     No Known Drug Allergies        Reviewed and updated as needed this visit by clinical staff  Tobacco  Allergies  Problems  Med Hx  Soc Hx      Reviewed and updated as needed this visit by Provider         ROS:  Constitutional, HEENT, cardiovascular, pulmonary, gi and gu systems are negative, except as otherwise noted.    OBJECTIVE:                                                    /80 (BP Location: Right arm, Cuff Size: Adult Regular)  Pulse 86  Temp 97.7  F (36.5  C) (Oral)  Resp 16  Wt 164 lb (74.4 kg)  LMP 10/22/2008  SpO2 96%  BMI 29.05 kg/m2  Body mass index is 29.05 kg/(m^2).  GENERAL: healthy, alert and no distress  RESP: lungs clear to auscultation - no rales, rhonchi or wheezes  CV: regular rate and rhythm, normal S1 S2, no S3 or S4, no murmur, click or rub, no peripheral edema and peripheral pulses strong  MS: no gross musculoskeletal defects noted, gait normal    Diagnostic  Test Results:  none      ASSESSMENT/PLAN:                                                      1. HTN, goal below 140/90  Well controlled; labs today.  Refilled.   - amLODIPine-benazepril (LOTREL) 5-20 MG per capsule; Take 1 capsule by mouth 2 times daily  Dispense: 180 capsule; Refill: 1  - **CBC with platelets FUTURE 1yr  - **Comprehensive metabolic panel FUTURE 1yr    2. Hyperlipidemia LDL goal <130  Fasting; labs today.  Notes she just picked up med last month.  Does not need a refill at this time.   - **Lipid panel reflex to direct LDL FUTURE 1yr    F/u in 6 mos    FANI Lela [facility]      Physical Exam

## 2017-06-01 LAB
ALBUMIN SERPL-MCNC: 4.5 G/DL (ref 3.4–5)
ALP SERPL-CCNC: 84 U/L (ref 40–150)
ALT SERPL W P-5'-P-CCNC: 26 U/L (ref 0–50)
ANION GAP SERPL CALCULATED.3IONS-SCNC: 10 MMOL/L (ref 3–14)
AST SERPL W P-5'-P-CCNC: 15 U/L (ref 0–45)
BILIRUB SERPL-MCNC: 0.8 MG/DL (ref 0.2–1.3)
BUN SERPL-MCNC: 13 MG/DL (ref 7–30)
CALCIUM SERPL-MCNC: 9.3 MG/DL (ref 8.5–10.1)
CHLORIDE SERPL-SCNC: 105 MMOL/L (ref 94–109)
CHOLEST SERPL-MCNC: 212 MG/DL
CO2 SERPL-SCNC: 27 MMOL/L (ref 20–32)
CREAT SERPL-MCNC: 0.61 MG/DL (ref 0.52–1.04)
GFR SERPL CREATININE-BSD FRML MDRD: NORMAL ML/MIN/1.7M2
GLUCOSE SERPL-MCNC: 83 MG/DL (ref 70–99)
HDLC SERPL-MCNC: 75 MG/DL
LDLC SERPL CALC-MCNC: 122 MG/DL
NONHDLC SERPL-MCNC: 137 MG/DL
POTASSIUM SERPL-SCNC: 4.1 MMOL/L (ref 3.4–5.3)
PROT SERPL-MCNC: 8.3 G/DL (ref 6.8–8.8)
SODIUM SERPL-SCNC: 142 MMOL/L (ref 133–144)
TRIGL SERPL-MCNC: 76 MG/DL

## 2017-07-01 DIAGNOSIS — E78.5 HYPERLIPIDEMIA LDL GOAL <130: ICD-10-CM

## 2017-07-03 NOTE — TELEPHONE ENCOUNTER
Simvastatin    Last Written Prescription Date: 4/3/17  Last Fill Quantity: 90,  # refills: 0   Last Office Visit with [facility], [facility] or [facility] prescribing provider: 3/31/17 Víctor

## 2017-07-05 RX ORDER — SIMVASTATIN 20 MG
TABLET ORAL
Qty: 90 TABLET | Refills: 2 | Status: SHIPPED | OUTPATIENT
Start: 2017-07-05 | End: 2018-03-10

## 2017-07-05 NOTE — TELEPHONE ENCOUNTER
Prescription approved per [facility] Refill Protocol.  Tim Esquivel RN

## 2017-09-14 ENCOUNTER — RADIANT APPOINTMENT (OUTPATIENT)
Dept: MAMMOGRAPHY | Facility: CLINIC | Age: 62
End: 2017-09-14
Payer: COMMERCIAL

## 2017-09-14 DIAGNOSIS — Z12.31 VISIT FOR SCREENING MAMMOGRAM: ICD-10-CM

## 2017-09-14 PROCEDURE — G0202 SCR MAMMO BI INCL CAD: HCPCS | Mod: TC

## 2017-10-24 ENCOUNTER — TRANSFERRED RECORDS (OUTPATIENT)
Dept: HEALTH INFORMATION MANAGEMENT | Facility: CLINIC | Age: 62
End: 2017-10-24

## 2017-12-03 DIAGNOSIS — I10 HTN, GOAL BELOW 140/90: ICD-10-CM

## 2017-12-04 NOTE — TELEPHONE ENCOUNTER
Last Office Visit with [facility], [facility] or [facility] prescribing provider: 5/30/2017

## 2017-12-05 RX ORDER — AMLODIPINE AND BENAZEPRIL HYDROCHLORIDE 5; 20 MG/1; MG/1
CAPSULE ORAL
Qty: 180 CAPSULE | Refills: 1 | Status: SHIPPED | OUTPATIENT
Start: 2017-12-05 | End: 2018-06-05

## 2017-12-05 NOTE — TELEPHONE ENCOUNTER
Prescription approved per [facility] Refill Protocol.  Nany Patricia RN

## 2018-01-30 ENCOUNTER — OFFICE VISIT (OUTPATIENT)
Dept: URGENT CARE | Facility: URGENT CARE | Age: 63
End: 2018-01-30
Payer: COMMERCIAL

## 2018-01-30 ENCOUNTER — ALLIED HEALTH/NURSE VISIT (OUTPATIENT)
Dept: NURSING | Facility: CLINIC | Age: 63
End: 2018-01-30
Payer: COMMERCIAL

## 2018-01-30 VITALS
DIASTOLIC BLOOD PRESSURE: 82 MMHG | SYSTOLIC BLOOD PRESSURE: 110 MMHG | TEMPERATURE: 97.3 F | OXYGEN SATURATION: 99 % | HEART RATE: 85 BPM

## 2018-01-30 DIAGNOSIS — H61.23 BILATERAL IMPACTED CERUMEN: Primary | ICD-10-CM

## 2018-01-30 DIAGNOSIS — Z23 NEED FOR PROPHYLACTIC VACCINATION AND INOCULATION AGAINST INFLUENZA: Primary | ICD-10-CM

## 2018-01-30 DIAGNOSIS — H92.01 OTALGIA, RIGHT: ICD-10-CM

## 2018-01-30 PROCEDURE — 90686 IIV4 VACC NO PRSV 0.5 ML IM: CPT

## 2018-01-30 PROCEDURE — 99213 OFFICE O/P EST LOW 20 MIN: CPT | Performed by: FAMILY MEDICINE

## 2018-01-30 PROCEDURE — 90471 IMMUNIZATION ADMIN: CPT

## 2018-01-30 NOTE — PROGRESS NOTES
SUBJECTIVE:   Nu Taylor is a 62 year old female presenting with a chief complaint of plugged ears bilaterally since getting over a recent uri.   Then the right ear starting hurting off and on over the past 1-2 days.  No fevers.  She started using a natural ear drop product 1/28 evening, without relief.  Predisposing factors include:  Non smoker, no h/o asthma.     ROS:  5-Point Review of Systems Negative-- Except as stated above.    OBJECTIVE  /82 (BP Location: Right arm, Patient Position: Chair, Cuff Size: Adult Large)  Pulse 85  Temp 97.3  F (36.3  C) (Oral)  LMP 10/22/2008  SpO2 99%  GENERAL:  Awake, alert and interactive. No acute distress.  HEAD:   NC/AT, EOMI, clear conjunctiva.  Nose clear.  EAC's both with wax impaction.   After gentle irrigation on right side, patient felt a little dizzy with a headache and procedure was discontinued.  She quickly felt better and on exam, ~ 1/2 of the EAC on the right is now clear and benign in appearance.  Large amount of wax persists in inner portion of the EAC and TM not visible.   Gentle removal with curette attempted with some discomfort and discontinued.  Left EAC not irrigated.      ASSESSMENT/PLAN    ICD-10-CM    1. Bilateral impacted cerumen H61.23    2. Otalgia, right H92.01      Ibuprofen for discomfort, start debrox/wax softening drops.  Recheck if plugging not resolving over next 2-3 days or if right earache persists or any worsening symptoms develop.

## 2018-01-30 NOTE — MR AVS SNAPSHOT
After Visit Summary   1/30/2018    Nu Taylor    MRN: [id number]           Patient Information     Date Of Birth          1955        Visit Information        Provider Department      1/30/2018 4:30 PM LV MA/LPN [facility]        Today's Diagnoses     Need for prophylactic vaccination and inoculation against influenza    -  1       Follow-ups after your visit        Your next 10 appointments already scheduled     Jan 30, 2018  4:30 PM CST   Nurse Only with LV MA/LPN   [facility] ([facility])    [address] [address] [address] [phone]              Who to contact     If you have questions or need follow up information about today's clinic visit or your schedule please contact [facility] directly at [phone].  Normal or non-critical lab and imaging results will be communicated to you by MyChart, letter or phone within 4 business days after the clinic has received the results. If you do not hear from us within 7 days, please contact the clinic through MyChart or phone. If you have a critical or abnormal lab result, we will notify you by phone as soon as possible.  Submit refill requests through [employer] or call your pharmacy and they will forward the refill request to us. Please allow 3 business days for your refill to be completed.          Additional Information About Your Visit        MyChart Information     [employer] gives you secure access to your electronic health record. If you see a primary care provider, you can also send messages to your care team and make appointments. If you have questions, please call your primary care clinic.  If you do not have a primary care provider, please call [phone] and they will assist you.        Care EveryWhere ID     This is your Care EveryWhere ID. This could be used by other organizations to access your [facility] medical records  [id number]        Your Vitals Were      Last Period                   10/22/2008            Blood Pressure from Last 3 Encounters:   05/30/17 118/80   04/17/17 120/74   03/31/17 124/68    Weight from Last 3 Encounters:   05/30/17 164 lb (74.4 kg)   04/17/17 165 lb (74.8 kg)   03/31/17 165 lb (74.8 kg)              We Performed the Following     FLU VAC, SPLIT VIRUS IM > 3 YO (QUADRIVALENT) [14592]     Vaccine Administration, Initial [59068]        Primary Care Provider Office Phone # Fax #    Gloria Antonio [phone] [address] [address] [facility] [address]        Equal Access to Services     GIN GOLD : Karrie Chahal, marciano farnsworth, emily kessler, tuyet marsh. So [facility] [phone].    ATENCIÓN: Si habla español, tiene a العلي disposición servicios gratuitos de asistencia lingüística. Llame al [phone].    We comply with applicable federal civil rights laws and [place] laws. We do not discriminate on the basis of race, color, national origin, age, disability, sex, sexual orientation, or gender identity.            Thank you!     Thank you for choosing [facility]  for your care. Our goal is always to provide you with excellent care. Hearing back from our patients is one way we can continue to improve our services. Please take a few minutes to complete the written survey that you may receive in the mail after your visit with us. Thank you!             Your Updated Medication List - Protect others around you: Learn how to safely use, store and throw away your medicines at www.disposemymeds.org.          This list is accurate as of 1/30/18  4:15 PM.  Always use your most recent med list.                   Brand Name Dispense Instructions for use Diagnosis    amLODIPine-benazepril 5-20 MG per capsule    LOTREL    180 capsule    TAKE ONE CAPSULE BY MOUTH TWICE DAILY    HTN, goal below 140/90       CENTRUM PO           OXcarbazepine 300 MG tablet    TRILEPTAL     90 tablet    TAKE ONE TABLET BY MOUTH THREE TIMES DAILY    Trigeminal neuralgia       simvastatin 20 MG tablet    ZOCOR    90 tablet    TAKE ONE TABLET BY MOUTH ONCE DAILY IN THE EVENING    Hyperlipidemia LDL goal <130

## 2018-01-30 NOTE — MR AVS SNAPSHOT
After Visit Summary   1/30/2018    Nu Taylor    MRN: [id number]           Patient Information     Date Of Birth          1955        Visit Information        Provider Department      1/30/2018 5:00 PM Geovanna Restrepo DO [facility]        Today's Diagnoses     Bilateral impacted cerumen    -  1    Otalgia, right           Follow-ups after your visit        Who to contact     If you have questions or need follow up information about today's clinic visit or your schedule please contact [facility] directly at [phone].  Normal or non-critical lab and imaging results will be communicated to you by [employer], letter or phone within 4 business days after the clinic has received the results. If you do not hear from us within 7 days, please contact the clinic through [employer] or phone. If you have a critical or abnormal lab result, we will notify you by phone as soon as possible.  Submit refill requests through [employer] or call your pharmacy and they will forward the refill request to us. Please allow 3 business days for your refill to be completed.          Additional Information About Your Visit        MyChart Information     [employer] gives you secure access to your electronic health record. If you see a primary care provider, you can also send messages to your care team and make appointments. If you have questions, please call your primary care clinic.  If you do not have a primary care provider, please call [phone] and they will assist you.        Care EveryWhere ID     This is your Care EveryWhere ID. This could be used by other organizations to access your [place] medical records  [id number]        Your Vitals Were     Pulse Temperature Last Period Pulse Oximetry          85 97.3  F (36.3  C) (Oral) 10/22/2008 99%         Blood Pressure from Last 3 Encounters:   01/30/18 110/82   05/30/17 118/80   04/17/17 120/74    Weight from Last 3  Encounters:   05/30/17 164 lb (74.4 kg)   04/17/17 165 lb (74.8 kg)   03/31/17 165 lb (74.8 kg)              Today, you had the following     No orders found for display       Primary Care Provider Office Phone # Fax #    Gloria Antonio [phone] [address] [address] [address]        Equal Access to Services     GIN GOLD : Hadii aad ku hadasho Soomaali, waaxda luqadaha, qaybta kaalmada adeegyada, waxay idiin hayaan adeeg khjudd lawill . So [facility] [phone].    ATENCIÓN: Si habla espfaisal, tiene a العلي disposición servicios gratuitos de asistencia lingüística. Llame al [phone].    We comply with applicable federal civil rights laws and [place] laws. We do not discriminate on the basis of race, color, national origin, age, disability, sex, sexual orientation, or gender identity.            Thank you!     Thank you for choosing [facility]  for your care. Our goal is always to provide you with excellent care. Hearing back from our patients is one way we can continue to improve our services. Please take a few minutes to complete the written survey that you may receive in the mail after your visit with us. Thank you!             Your Updated Medication List - Protect others around you: Learn how to safely use, store and throw away your medicines at www.disposemymeds.org.          This list is accurate as of 1/30/18  5:28 PM.  Always use your most recent med list.                   Brand Name Dispense Instructions for use Diagnosis    amLODIPine-benazepril 5-20 MG per capsule    LOTREL    180 capsule    TAKE ONE CAPSULE BY MOUTH TWICE DAILY    HTN, goal below 140/90       CENTRUM PO           OXcarbazepine 300 MG tablet    TRILEPTAL    90 tablet    TAKE ONE TABLET BY MOUTH THREE TIMES DAILY    Trigeminal neuralgia       simvastatin 20 MG tablet    ZOCOR    90 tablet    TAKE ONE TABLET BY MOUTH ONCE DAILY IN THE EVENING    Hyperlipidemia LDL goal <130

## 2018-01-30 NOTE — PROGRESS NOTES

## 2018-01-30 NOTE — NURSING NOTE
"Chief Complaint   Patient presents with     Urgent Care     Otalgia     Possible R ear infection started today, bilateral ear plugged       Initial /82 (BP Location: Right arm, Patient Position: Chair, Cuff Size: Adult Large)  Pulse 85  Temp 97.3  F (36.3  C) (Oral)  LMP 10/22/2008  SpO2 99% Estimated body mass index is 29.05 kg/(m^2) as calculated from the following:    Height as of 4/17/17: 5' 3\" (1.6 m).    Weight as of 5/30/17: 164 lb (74.4 kg).  Medication Reconciliation: complete     Natalie Henson CMA (AAMA)        "

## 2018-03-10 DIAGNOSIS — E78.5 HYPERLIPIDEMIA LDL GOAL <130: ICD-10-CM

## 2018-03-10 NOTE — TELEPHONE ENCOUNTER
Requested Prescriptions   Pending Prescriptions Disp Refills     simvastatin (ZOCOR) 20 MG tablet 90 tablet 2    There is no refill protocol information for this order      Pt called states she called [facility] to refill last week told pt no refill has been received. Pharmacy gave pt emergency supply for 5 days. Please let pt know if there is a problem with getting this refilled  Cecilia Davila/

## 2018-03-12 RX ORDER — SIMVASTATIN 20 MG
20 TABLET ORAL AT BEDTIME
Qty: 90 TABLET | Refills: 0 | Status: SHIPPED | OUTPATIENT
Start: 2018-03-12 | End: 2018-07-06

## 2018-03-12 NOTE — TELEPHONE ENCOUNTER
Prescription approved per [facility] Refill Protocol.  Tim Esquivel RN

## 2018-03-12 NOTE — TELEPHONE ENCOUNTER
"Requested Prescriptions   Pending Prescriptions Disp Refills     simvastatin (ZOCOR) 20 MG tablet  Last Written Prescription Date:  7/5/17  Last Fill Quantity: 90,  # refills: 2   Last Office Visit: 5/30/2017   Future Office Visit:      90 tablet 2    Statins Protocol Passed    3/12/2018  8:10 AM       Passed - LDL on file in past 12 months    Recent Labs   Lab Test  05/30/17   1524   LDL  122*            Passed - No abnormal creatine kinase in past 12 months    No lab results found.         Passed - Recent (12 mo) or future (30 days) visit within the authorizing provider's specialty    Patient had office visit in the last 12 months or has a visit in the next 30 days with authorizing provider or within the authorizing provider's specialty.  See \"Patient Info\" tab in inbasket, or \"Choose Columns\" in Meds & Orders section of the refill encounter.           Passed - Patient is age 18 or older       Passed - No active pregnancy on record       Passed - No positive pregnancy test in past 12 months          "

## 2018-06-09 ENCOUNTER — HEALTH MAINTENANCE LETTER (OUTPATIENT)
Age: 63
End: 2018-06-09

## 2018-07-06 DIAGNOSIS — I10 HTN, GOAL BELOW 140/90: ICD-10-CM

## 2018-07-06 DIAGNOSIS — E78.5 HYPERLIPIDEMIA LDL GOAL <130: ICD-10-CM

## 2018-07-06 RX ORDER — AMLODIPINE AND BENAZEPRIL HYDROCHLORIDE 5; 20 MG/1; MG/1
CAPSULE ORAL
Qty: 60 CAPSULE | Refills: 0 | Status: SHIPPED | OUTPATIENT
Start: 2018-07-06 | End: 2018-07-31

## 2018-07-06 RX ORDER — SIMVASTATIN 20 MG
TABLET ORAL
Qty: 30 TABLET | Refills: 0 | Status: SHIPPED | OUTPATIENT
Start: 2018-07-06 | End: 2018-07-31

## 2018-07-06 NOTE — TELEPHONE ENCOUNTER
Pt states out of meds and no insurance until 8/1/18. She states will schedule physical Pap in early Aug and before next Rx.   Tim Esquivel RN

## 2018-07-06 NOTE — TELEPHONE ENCOUNTER
Was given 30 day kai last refill    LM to schedule appt    Christine Simms RN, BS  Clinical Nurse Triage.

## 2018-07-06 NOTE — TELEPHONE ENCOUNTER
"Requested Prescriptions   Pending Prescriptions Disp Refills     amLODIPine-benazepril (LOTREL) 5-20 MG per capsule [Pharmacy Med Name: AMLOD/BENAZEPRIL 5-20MG  CAP] 60 capsule 0    Last Written Prescription Date:  6/6/18  Last Fill Quantity: 60,  # refills: 0   Last Office Visit: 5/30/2017   Future Office Visit:      Sig: TAKE 1 CAPSULE BY MOUTH TWICE DAILY    Calcium Channel Blockers Protocol  Failed    7/6/2018  5:30 AM       Failed - Recent (12 mo) or future (30 days) visit within the authorizing provider's specialty    Patient had office visit in the last 12 months or has a visit in the next 30 days with authorizing provider or within the authorizing provider's specialty.  See \"Patient Info\" tab in inbasket, or \"Choose Columns\" in Meds & Orders section of the refill encounter.           Failed - Normal serum creatinine on file in past 12 months    Recent Labs   Lab Test  05/30/17   1524   CR  0.61            Passed - Blood pressure under 140/90 in past 12 months    BP Readings from Last 3 Encounters:   01/30/18 110/82   05/30/17 118/80   04/17/17 120/74                Passed - Patient is age 18 or older       Passed - No active pregnancy on record       Passed - No positive pregnancy test in past 12 months          "

## 2018-07-06 NOTE — TELEPHONE ENCOUNTER
Pt needs 30 day supply , no insurance until 8/1/18.   Prescription approved per [facility] Refill Protocol.  Tim Esquivel RN

## 2018-07-17 ENCOUNTER — TELEPHONE (OUTPATIENT)
Dept: FAMILY MEDICINE | Facility: CLINIC | Age: 63
End: 2018-07-17

## 2018-07-17 NOTE — TELEPHONE ENCOUNTER
Patient called for her and her  seeking advice from the nurse on what they should do. They currently dont have insurance and wont ill 8/1/18, but need to do a f/u med check with Kavon Renee.     They are wondering what it may cost if they come in with no insurance or what the provider suggests.    Patient wants call back to follow up [phone]    Martha Ralph/MONALISA

## 2018-07-17 NOTE — TELEPHONE ENCOUNTER
No, I think it is ok to see them in august. Can we give 1 month refill then please.  Gloria Antonio MD  [facility]  [phone]

## 2018-07-17 NOTE — TELEPHONE ENCOUNTER
Dr. Antonio,is there immediate need for visit between now and August 1st? If yes what LOS? We could give pt [phone] for estimate for care questions.   Or OK to wait for fasting physical with Pap Aug 1, 2 or 3?    She has refills of simvastatin and Lotrel through about 8/3/18.   Tim Esquivel, RN

## 2018-07-31 ENCOUNTER — OFFICE VISIT (OUTPATIENT)
Dept: FAMILY MEDICINE | Facility: CLINIC | Age: 63
End: 2018-07-31
Payer: COMMERCIAL

## 2018-07-31 VITALS
OXYGEN SATURATION: 98 % | BODY MASS INDEX: 29.63 KG/M2 | HEART RATE: 74 BPM | HEIGHT: 62 IN | RESPIRATION RATE: 16 BRPM | TEMPERATURE: 98.2 F | SYSTOLIC BLOOD PRESSURE: 125 MMHG | WEIGHT: 161 LBS | DIASTOLIC BLOOD PRESSURE: 78 MMHG

## 2018-07-31 DIAGNOSIS — E78.5 HYPERLIPIDEMIA LDL GOAL <130: ICD-10-CM

## 2018-07-31 DIAGNOSIS — Z11.4 SCREENING FOR HUMAN IMMUNODEFICIENCY VIRUS: Primary | ICD-10-CM

## 2018-07-31 DIAGNOSIS — Z11.59 NEED FOR HEPATITIS C SCREENING TEST: ICD-10-CM

## 2018-07-31 DIAGNOSIS — I10 HTN, GOAL BELOW 140/90: ICD-10-CM

## 2018-07-31 LAB
ANION GAP SERPL CALCULATED.3IONS-SCNC: 5 MMOL/L (ref 3–14)
BUN SERPL-MCNC: 12 MG/DL (ref 7–30)
CALCIUM SERPL-MCNC: 9.3 MG/DL (ref 8.5–10.1)
CHLORIDE SERPL-SCNC: 106 MMOL/L (ref 94–109)
CHOLEST SERPL-MCNC: 191 MG/DL
CO2 SERPL-SCNC: 30 MMOL/L (ref 20–32)
CREAT SERPL-MCNC: 0.66 MG/DL (ref 0.52–1.04)
GFR SERPL CREATININE-BSD FRML MDRD: >90 ML/MIN/1.7M2
GLUCOSE SERPL-MCNC: 88 MG/DL (ref 70–99)
HCV AB SERPL QL IA: NONREACTIVE
HDLC SERPL-MCNC: 69 MG/DL
HIV 1+2 AB+HIV1 P24 AG SERPL QL IA: NONREACTIVE
LDLC SERPL CALC-MCNC: 105 MG/DL
NONHDLC SERPL-MCNC: 122 MG/DL
POTASSIUM SERPL-SCNC: 3.9 MMOL/L (ref 3.4–5.3)
SODIUM SERPL-SCNC: 141 MMOL/L (ref 133–144)
TRIGL SERPL-MCNC: 85 MG/DL

## 2018-07-31 PROCEDURE — 99214 OFFICE O/P EST MOD 30 MIN: CPT | Performed by: FAMILY MEDICINE

## 2018-07-31 PROCEDURE — 80048 BASIC METABOLIC PNL TOTAL CA: CPT | Performed by: FAMILY MEDICINE

## 2018-07-31 PROCEDURE — 86803 HEPATITIS C AB TEST: CPT | Performed by: FAMILY MEDICINE

## 2018-07-31 PROCEDURE — 36415 COLL VENOUS BLD VENIPUNCTURE: CPT | Performed by: FAMILY MEDICINE

## 2018-07-31 PROCEDURE — 80061 LIPID PANEL: CPT | Performed by: FAMILY MEDICINE

## 2018-07-31 PROCEDURE — 87389 HIV-1 AG W/HIV-1&-2 AB AG IA: CPT | Performed by: FAMILY MEDICINE

## 2018-07-31 RX ORDER — SIMVASTATIN 20 MG
20 TABLET ORAL AT BEDTIME
Qty: 90 TABLET | Refills: 3 | Status: SHIPPED | OUTPATIENT
Start: 2018-07-31 | End: 2019-08-11

## 2018-07-31 RX ORDER — AMLODIPINE AND BENAZEPRIL HYDROCHLORIDE 5; 20 MG/1; MG/1
1 CAPSULE ORAL DAILY
COMMUNITY
End: 2018-08-27

## 2018-07-31 RX ORDER — SIMVASTATIN 20 MG
20 TABLET ORAL AT BEDTIME
COMMUNITY
End: 2018-08-27

## 2018-07-31 RX ORDER — AMLODIPINE AND BENAZEPRIL HYDROCHLORIDE 5; 20 MG/1; MG/1
1 CAPSULE ORAL DAILY
Qty: 90 CAPSULE | Refills: 3 | Status: SHIPPED | OUTPATIENT
Start: 2018-07-31 | End: 2018-09-21

## 2018-07-31 NOTE — PROGRESS NOTES
SUBJECTIVE:   Nu Taylor is a 62 year old female who presents to clinic today for the following health issues:      History of Present Illness     Hyperlipidemia:     Low fat/chol diet rating::  Poor    Taking Statins::  YES    Lipid Medications or Supplements::  Other    Hypertension:     Outpatient blood pressures:  Are not being checked    Dietary sodium intake::  Other  Frequency of exercise:  2-3 days/week  Taking medications regularly:  Yes  Additional concerns today:  No    Has been getting symptoms of menopause, with hot flashes, mood swings.  Pt lost 9 pounds since last year, and she is walking to work.  Problem list and histories reviewed & adjusted, as indicated.  Additional history: as documented        Patient Active Problem List   Diagnosis     Actinic keratosis     Essential hypertension     Other abnormal heart sounds     Hallux rigidus     Pain in joint, shoulder region     Hyperlipidemia LDL goal <130     Advanced directives, counseling/discussion     HTN, goal below 140/90     Mitral valve stenosis, mild     Urinary frequency     DDD (degenerative disc disease), cervical     Seasonal allergic rhinitis     Trigeminal neuralgia     Complete tear of right rotator cuff     Past Surgical History:   Procedure Laterality Date     C LIGATION,FALLOPIAN TUBE W/       LAPAROSCOPIC SUSPENSION BLADDER NECK         Social History   Substance Use Topics     Smoking status: Never Smoker     Smokeless tobacco: Never Used     Alcohol use 0.0 oz/week     0 Standard drinks or equivalent per week      Comment: occational drink     Family History   Problem Relation Age of Onset     Leukemia Maternal Grandmother      Diabetes Son      type 1     Family History Negative No family hx of      C.A.D. No family hx of      Cancer - colorectal No family hx of            ROS:  RESP: NEGATIVE for significant cough or SOB  CV: NEGATIVE for chest pain, palpitations or peripheral edema    OBJECTIVE:     /78 (BP  "Location: Right arm, Patient Position: Chair, Cuff Size: Adult Large)  Pulse 74  Temp 98.2  F (36.8  C) (Oral)  Resp 16  Ht 5' 2\" (1.575 m)  Wt 161 lb (73 kg)  LMP 10/22/2008  SpO2 98%  BMI 29.45 kg/m2  Body mass index is 29.45 kg/(m^2).  GENERAL: healthy, alert and no distress  NECK: no adenopathy, no asymmetry, masses, or scars and thyroid normal to palpation  RESP: lungs clear to auscultation - no rales, rhonchi or wheezes  CV: regular rate and rhythm, normal S1 S2, no S3 or S4, no murmur, click or rub, no peripheral edema and peripheral pulses strong  MS: no gross musculoskeletal defects noted, no edema        ASSESSMENT/PLAN:             1. HTN, goal below 140/90  Controlled, continue on   - amLODIPine-benazepril (LOTREL) 5-20 MG per capsule; Take 1 capsule by mouth daily  Dispense: 90 capsule; Refill: 3  - Basic metabolic panel    2. Hyperlipidemia LDL goal <130  Controlled, continue   - simvastatin (ZOCOR) 20 MG tablet; Take 1 tablet (20 mg) by mouth At Bedtime  Dispense: 90 tablet; Refill: 3  - Lipid panel reflex to direct LDL Fasting    3. Screening for human immunodeficiency virus    - HIV Antigen Antibody Combo    4. Need for hepatitis C screening test    - Hepatitis C Screen Reflex to HCV RNA Quant and Genotype    Follow up in 1 month for pap smear.    Gloria Antonio MD  [facility]  Answers for HPI/ROS submitted by the patient on 7/31/2018   PHQ-2 Score: 0    "

## 2018-07-31 NOTE — MR AVS SNAPSHOT
After Visit Summary   7/31/2018    Nu Taylor    MRN: [id number]           Patient Information     Date Of Birth          1955        Visit Information        Provider Department      7/31/2018 7:30 AM Gloria Antonio MD [facility]        Today's Diagnoses     Screening for human immunodeficiency virus    -  1    HTN, goal below 140/90        Hyperlipidemia LDL goal <130        Need for hepatitis C screening test           Follow-ups after your visit        Follow-up notes from your care team     Return in about 1 year (around 7/31/2019).      Who to contact     If you have questions or need follow up information about today's clinic visit or your schedule please contact [facility] directly at [phone].  Normal or non-critical lab and imaging results will be communicated to you by MyChart, letter or phone within 4 business days after the clinic has received the results. If you do not hear from us within 7 days, please contact the clinic through 1stdibshart or phone. If you have a critical or abnormal lab result, we will notify you by phone as soon as possible.  Submit refill requests through [employer] or call your pharmacy and they will forward the refill request to us. Please allow 3 business days for your refill to be completed.          Additional Information About Your Visit        MyChart Information     [employer] gives you secure access to your electronic health record. If you see a primary care provider, you can also send messages to your care team and make appointments. If you have questions, please call your primary care clinic.  If you do not have a primary care provider, please call [phone] and they will assist you.        Care EveryWhere ID     This is your Care EveryWhere ID. This could be used by other organizations to access your [facility] medical records  [id number]        Your Vitals Were     Pulse Temperature Respirations Height Last Period  "Pulse Oximetry    74 98.2  F (36.8  C) (Oral) 16 5' 2\" (1.575 m) 10/22/2008 98%    BMI (Body Mass Index)                   29.45 kg/m2            Blood Pressure from Last 3 Encounters:   07/31/18 125/78   01/30/18 110/82   05/30/17 118/80    Weight from Last 3 Encounters:   07/31/18 161 lb (73 kg)   05/30/17 164 lb (74.4 kg)   04/17/17 165 lb (74.8 kg)              We Performed the Following     Basic metabolic panel     Hepatitis C Screen Reflex to HCV RNA Quant and Genotype     HIV Antigen Antibody Combo     Lipid panel reflex to direct LDL Fasting          Today's Medication Changes          These changes are accurate as of 7/31/18  8:18 AM.  If you have any questions, ask your nurse or doctor.               These medicines have changed or have updated prescriptions.        Dose/Directions    * amLODIPine-benazepril 5-20 MG per capsule   Commonly known as:  LOTREL   This may have changed:  Another medication with the same name was changed. Make sure you understand how and when to take each.   Changed by:  Gloria Antonio MD        Dose:  1 capsule   Take 1 capsule by mouth daily   Refills:  0       * amLODIPine-benazepril 5-20 MG per capsule   Commonly known as:  LOTREL   This may have changed:  See the new instructions.   Used for:  HTN, goal below 140/90   Changed by:  Gloria Antonio MD        Dose:  1 capsule   Take 1 capsule by mouth daily   Quantity:  90 capsule   Refills:  3       * simvastatin 20 MG tablet   Commonly known as:  ZOCOR   This may have changed:  Another medication with the same name was changed. Make sure you understand how and when to take each.   Changed by:  Gloria Antonio MD        Dose:  20 mg   Take 20 mg by mouth At Bedtime   Refills:  0       * simvastatin 20 MG tablet   Commonly known as:  ZOCOR   This may have changed:  See the new instructions.   Used for:  Hyperlipidemia LDL goal <130   Changed by:  Gloria Antonio MD        Dose:  20 mg   Take 1 tablet (20 mg) by mouth At Bedtime   Quantity:  " 90 tablet   Refills:  3       * Notice:  This list has 4 medication(s) that are the same as other medications prescribed for you. Read the directions carefully, and ask your doctor or other care provider to review them with you.         Where to get your medicines      These medications were sent to [id number] - [place], [place] - [address] [address]  [address] [address] [address]     Phone:  [phone]     amLODIPine-benazepril 5-20 MG per capsule    simvastatin 20 MG tablet                Primary Care Provider Office Phone # Fax #    Gloria Antonio -[phone] [address] [address] [address]        Equal Access to Services     GIN GOLD : Karrie elo Fela, wajose fda javad, qaybta kaalmada adeclaudette, tuyet pearce . So [facility] [phone].    ATENCIÓN: Si habla español, tiene a العلي disposición servicios gratuitos de asistencia lingüística. Llame al [phone].    We comply with applicable federal civil rights laws and [place] laws. We do not discriminate on the basis of race, color, national origin, age, disability, sex, sexual orientation, or gender identity.            Thank you!     Thank you for choosing [facility]  for your care. Our goal is always to provide you with excellent care. Hearing back from our patients is one way we can continue to improve our services. Please take a few minutes to complete the written survey that you may receive in the mail after your visit with us. Thank you!             Your Updated Medication List - Protect others around you: Learn how to safely use, store and throw away your medicines at www.disposemymeds.org.          This list is accurate as of 7/31/18  8:18 AM.  Always use your most recent med list.                   Brand Name Dispense Instructions for use Diagnosis    * amLODIPine-benazepril 5-20 MG per capsule    LOTREL     Take 1 capsule by mouth daily        *  amLODIPine-benazepril 5-20 MG per capsule    LOTREL    90 capsule    Take 1 capsule by mouth daily    HTN, goal below 140/90       CENTRUM PO           * simvastatin 20 MG tablet    ZOCOR     Take 20 mg by mouth At Bedtime        * simvastatin 20 MG tablet    ZOCOR    90 tablet    Take 1 tablet (20 mg) by mouth At Bedtime    Hyperlipidemia LDL goal <130       * Notice:  This list has 4 medication(s) that are the same as other medications prescribed for you. Read the directions carefully, and ask your doctor or other care provider to review them with you.

## 2018-08-27 ENCOUNTER — OFFICE VISIT (OUTPATIENT)
Dept: FAMILY MEDICINE | Facility: CLINIC | Age: 63
End: 2018-08-27
Payer: COMMERCIAL

## 2018-08-27 VITALS
TEMPERATURE: 98.3 F | BODY MASS INDEX: 29.58 KG/M2 | HEART RATE: 74 BPM | RESPIRATION RATE: 16 BRPM | WEIGHT: 161.7 LBS | DIASTOLIC BLOOD PRESSURE: 80 MMHG | SYSTOLIC BLOOD PRESSURE: 130 MMHG

## 2018-08-27 DIAGNOSIS — Z00.00 ROUTINE GENERAL MEDICAL EXAMINATION AT A HEALTH CARE FACILITY: Primary | ICD-10-CM

## 2018-08-27 DIAGNOSIS — I10 HTN, GOAL BELOW 140/90: ICD-10-CM

## 2018-08-27 DIAGNOSIS — Z12.4 SCREENING FOR MALIGNANT NEOPLASM OF CERVIX: ICD-10-CM

## 2018-08-27 PROCEDURE — G0123 SCREEN CERV/VAG THIN LAYER: HCPCS | Performed by: FAMILY MEDICINE

## 2018-08-27 PROCEDURE — 87624 HPV HI-RISK TYP POOLED RSLT: CPT | Performed by: FAMILY MEDICINE

## 2018-08-27 PROCEDURE — 99396 PREV VISIT EST AGE 40-64: CPT | Performed by: FAMILY MEDICINE

## 2018-08-27 NOTE — PROGRESS NOTES
SUBJECTIVE:   CC: Nu Taylor is an 62 year old woman who presents for preventive health visit.     Physical   Annual:     Getting at least 3 servings of Calcium per day:  Yes    Bi-annual eye exam:  Yes    Dental care twice a year:  Yes    Sleep apnea or symptoms of sleep apnea:  None    Diet:  Regular (no restrictions)    Frequency of exercise:  2-3 days/week    Duration of exercise:  15-30 minutes    Taking medications regularly:  Yes    Medication side effects:  None    Additional concerns today:  No        Hyperlipidemia Follow-Up      Rate your low fat/cholesterol diet?: good    Taking statin?  Yes, no muscle aches from statin    Other lipid medications/supplements?:  none    Hypertension Follow-up      Outpatient blood pressures are not being checked.    Low Salt Diet: no added salt      Today's PHQ-2 Score:   PHQ-2 ( 1999 [employer]) 8/27/2018   Q1: Little interest or pleasure in doing things 0   Q2: Feeling down, depressed or hopeless 0   PHQ-2 Score 0   Q1: Little interest or pleasure in doing things Not at all   Q2: Feeling down, depressed or hopeless Not at all   PHQ-2 Score 0       Abuse: Current or Past(Physical, Sexual or Emotional)- No  Do you feel safe in your environment - Yes    Social History   Substance Use Topics     Smoking status: Never Smoker     Smokeless tobacco: Never Used     Alcohol use 0.0 oz/week     0 Standard drinks or equivalent per week      Comment: occational drink     Alcohol Use 8/27/2018   If you drink alcohol do you typically have greater than 3 drinks per day OR greater than 7 drinks per week? No       Reviewed orders with patient.  Reviewed health maintenance and updated orders accordingly - Yes  Labs reviewed in EPIC  BP Readings from Last 3 Encounters:   08/27/18 130/80   07/31/18 125/78   01/30/18 110/82    Wt Readings from Last 3 Encounters:   08/27/18 161 lb 11.2 oz (73.3 kg)   07/31/18 161 lb (73 kg)   05/30/17 164 lb (74.4 kg)                  Patient Active  Problem List   Diagnosis     Actinic keratosis     Essential hypertension     Other abnormal heart sounds     Hallux rigidus     Pain in joint, shoulder region     Hyperlipidemia LDL goal <130     Advanced directives, counseling/discussion     HTN, goal below 140/90     Mitral valve stenosis, mild     Urinary frequency     DDD (degenerative disc disease), cervical     Seasonal allergic rhinitis     Trigeminal neuralgia     Complete tear of right rotator cuff     Past Surgical History:   Procedure Laterality Date     C LIGATION,FALLOPIAN TUBE W/       LAPAROSCOPIC SUSPENSION BLADDER NECK         Social History   Substance Use Topics     Smoking status: Never Smoker     Smokeless tobacco: Never Used     Alcohol use 0.0 oz/week     0 Standard drinks or equivalent per week      Comment: occational drink     Family History   Problem Relation Age of Onset     Leukemia Maternal Grandmother      Diabetes Son      type 1     Family History Negative No family hx of      C.A.D. No family hx of      Cancer - colorectal No family hx of          Current Outpatient Prescriptions   Medication Sig Dispense Refill     amLODIPine-benazepril (LOTREL) 5-20 MG per capsule Take 1 capsule by mouth daily 90 capsule 3     Multiple Vitamins-Minerals (CENTRUM PO)        simvastatin (ZOCOR) 20 MG tablet Take 1 tablet (20 mg) by mouth At Bedtime 90 tablet 3     [DISCONTINUED] amLODIPine-benazepril (LOTREL) 5-20 MG per capsule Take 1 capsule by mouth daily       [DISCONTINUED] simvastatin (ZOCOR) 20 MG tablet Take 20 mg by mouth At Bedtime       Allergies   Allergen Reactions     No Known Drug Allergies        Patient over age 50, mutual decision to screen reflected in health maintenance.    Pertinent mammograms are reviewed under the imaging tab.  History of abnormal Pap smear: NO - age 30-65 PAP every 5 years with negative HPV co-testing recommended  PAP / HPV Latest Ref Rng & Units 4/3/2015 10/18/2011 2010   PAP - NIL NIL NIL    HPV 16 DNA NEG Negative - -   HPV 18 DNA NEG Negative - -   OTHER HR HPV NEG Negative - -     Reviewed and updated as needed this visit by clinical staff         Reviewed and updated as needed this visit by Provider        Past Medical History:   Diagnosis Date     Allergic rhinitis due to other allergen      HTN (hypertension)      Hyperlipidemia LDL goal <130 2008      Past Surgical History:   Procedure Laterality Date     C LIGATION,FALLOPIAN TUBE W/       LAPAROSCOPIC SUSPENSION BLADDER NECK         Review of Systems  CONSTITUTIONAL: NEGATIVE for fever, chills, change in weight  INTEGUMENTARY/SKIN: NEGATIVE for worrisome rashes, moles or lesions  EYES: NEGATIVE for vision changes or irritation  ENT: NEGATIVE for ear, mouth and throat problems  RESP: NEGATIVE for significant cough or SOB  BREAST: NEGATIVE for masses, tenderness or discharge  CV: NEGATIVE for chest pain, palpitations or peripheral edema  GI: NEGATIVE for nausea, abdominal pain, heartburn, or change in bowel habits  : NEGATIVE for unusual urinary or vaginal symptoms. No vaginal bleeding.  MUSCULOSKELETAL: NEGATIVE for significant arthralgias or myalgia  NEURO: NEGATIVE for weakness, dizziness or paresthesias  PSYCHIATRIC: NEGATIVE for changes in mood or affect      OBJECTIVE:   LMP 10/22/2008  Physical Exam  GENERAL: healthy, alert and no distress  EYES: Eyes grossly normal to inspection, PERRL and conjunctivae and sclerae normal  HENT: ear canals and TM's normal, nose and mouth without ulcers or lesions  NECK: no adenopathy, no asymmetry, masses, or scars and thyroid normal to palpation  RESP: lungs clear to auscultation - no rales, rhonchi or wheezes  CV: regular rate and rhythm, normal S1 S2, no S3 or S4, no murmur, click or rub, no peripheral edema and peripheral pulses strong  ABDOMEN: soft, nontender, no hepatosplenomegaly, no masses and bowel sounds normal   (female): normal female external genitalia, normal urethral  "meatus, vaginal mucosa pink, moist, well rugated, and normal cervix/adnexa/uterus without masses or discharge  MS: no gross musculoskeletal defects noted, no edema  SKIN: no suspicious lesions or rashes  NEURO: Normal strength and tone, mentation intact and speech normal  PSYCH: mentation appears normal, affect normal/bright        ASSESSMENT/PLAN:   1. Screening for malignant neoplasm of cervix    - Pap imaged thin layer screen with HPV - recommended age 30 - 65    2. Routine general medical examination at a health care facility  Today I counseled the patient about diet, regular exercise and weight loss planning.      3. HTN, goal below 140/90  Controlled, contiue on same medications.      COUNSELING:  Reviewed preventive health counseling, as reflected in patient instructions       Regular exercise       Healthy diet/nutrition    BP Readings from Last 1 Encounters:   07/31/18 125/78     Estimated body mass index is 29.45 kg/(m^2) as calculated from the following:    Height as of 7/31/18: 5' 2\" (1.575 m).    Weight as of 7/31/18: 161 lb (73 kg).      Weight management plan: Discussed healthy diet and exercise guidelines and patient will follow up in 12 months in clinic to re-evaluate.     reports that she has never smoked. She has never used smokeless tobacco.      Counseling Resources:  ATP IV Guidelines  Pooled Cohorts Equation Calculator  Breast Cancer Risk Calculator  FRAX Risk Assessment  ICSI Preventive Guidelines  Dietary Guidelines for Americans, 2010  USDA's MyPlate  ASA Prophylaxis  Lung CA Screening    Gloria Antonio MD  [facility]  Answers for HPI/ROS submitted by the patient on 8/27/2018   PHQ-2 Score: 0    "

## 2018-08-27 NOTE — MR AVS SNAPSHOT
After Visit Summary   8/27/2018    Nu Taylor    MRN: [id number]           Patient Information     Date Of Birth          1955        Visit Information        Provider Department      8/27/2018 7:30 AM Gloria Antonio MD [facility]        Today's Diagnoses     Screening for malignant neoplasm of cervix    -  1      Care Instructions      Preventive Health Recommendations  Female Ages 50 - 64    Yearly exam: See your health care provider every year in order to  o Review health changes.   o Discuss preventive care.    o Review your medicines if your doctor has prescribed any.      Get a Pap test every three years (unless you have an abnormal result and your provider advises testing more often).    If you get Pap tests with HPV test, you only need to test every 5 years, unless you have an abnormal result.     You do not need a Pap test if your uterus was removed (hysterectomy) and you have not had cancer.    You should be tested each year for STDs (sexually transmitted diseases) if you're at risk.     Have a mammogram every 1 to 2 years.    Have a colonoscopy at age 50, or have a yearly FIT test (stool test). These exams screen for colon cancer.      Have a cholesterol test every 5 years, or more often if advised.    Have a diabetes test (fasting glucose) every three years. If you are at risk for diabetes, you should have this test more often.     If you are at risk for osteoporosis (brittle bone disease), think about having a bone density scan (DEXA).    Shots: Get a flu shot each year. Get a tetanus shot every 10 years.    Nutrition:     Eat at least 5 servings of fruits and vegetables each day.    Eat whole-grain bread, whole-wheat pasta and brown rice instead of white grains and rice.    Get adequate Calcium and Vitamin D.     Lifestyle    Exercise at least 150 minutes a week (30 minutes a day, 5 days a week). This will help you control your weight and prevent disease.    Limit  alcohol to one drink per day.    No smoking.     Wear sunscreen to prevent skin cancer.     See your dentist every six months for an exam and cleaning.    See your eye doctor every 1 to 2 years.            Follow-ups after your visit        Follow-up notes from your care team     Return in about 1 year (around 8/27/2019).      Who to contact     If you have questions or need follow up information about today's clinic visit or your schedule please contact [facility] directly at [phone].  Normal or non-critical lab and imaging results will be communicated to you by [employer], letter or phone within 4 business days after the clinic has received the results. If you do not hear from us within 7 days, please contact the clinic through [employer] or phone. If you have a critical or abnormal lab result, we will notify you by phone as soon as possible.  Submit refill requests through [employer] or call your pharmacy and they will forward the refill request to us. Please allow 3 business days for your refill to be completed.          Additional Information About Your Visit        Barnan[employer] Information     [employer] gives you secure access to your electronic health record. If you see a primary care provider, you can also send messages to your care team and make appointments. If you have questions, please call your primary care clinic.  If you do not have a primary care provider, please call [phone] and they will assist you.        Care EveryWhere ID     This is your Care EveryWhere ID. This could be used by other organizations to access your [facility] medical records  [id number]        Your Vitals Were     Pulse Temperature Respirations Last Period BMI (Body Mass Index)       74 98.3  F (36.8  C) (Oral) 16 10/22/2008 29.58 kg/m2        Blood Pressure from Last 3 Encounters:   08/27/18 130/80   07/31/18 125/78   01/30/18 110/82    Weight from Last 3 Encounters:   08/27/18 161 lb 11.2 oz (73.3 kg)   07/31/18  161 lb (73 kg)   05/30/17 164 lb (74.4 kg)              We Performed the Following     Pap imaged thin layer screen with HPV - recommended age 30 - 65        Primary Care Provider Office Phone # Fax #    Gloria Antonio [phone] [address]  [facility] [address]        Equal Access to Services     MICHELINE [facility] Center[facility] : Hadii aad ku hadasho Soomaali, waaxda luqadaha, qaybta kaalmada adeegyada, waxay idiin hayaan adeeg khnenadilan lalovelyn . So [facility] [phone].    ATENCIÓN: Si habla español, tiene a العلي disposición servicios gratuitos de asistencia lingüística. Llame al [phone].    We comply with applicable federal civil rights laws and [place] laws. We do not discriminate on the basis of race, color, national origin, age, disability, sex, sexual orientation, or gender identity.            Thank you!     Thank you for choosing [facility]  for your care. Our goal is always to provide you with excellent care. Hearing back from our patients is one way we can continue to improve our services. Please take a few minutes to complete the written survey that you may receive in the mail after your visit with us. Thank you!             Your Updated Medication List - Protect others around you: Learn how to safely use, store and throw away your medicines at www.disposemymeds.org.          This list is accurate as of 8/27/18  8:16 AM.  Always use your most recent med list.                   Brand Name Dispense Instructions for use Diagnosis    amLODIPine-benazepril 5-20 MG per capsule    LOTREL    90 capsule    Take 1 capsule by mouth daily    HTN, goal below 140/90       CENTRUM PO           simvastatin 20 MG tablet    ZOCOR    90 tablet    Take 1 tablet (20 mg) by mouth At Bedtime    Hyperlipidemia LDL goal <130

## 2018-08-29 LAB
COPATH REPORT: NORMAL
PAP: NORMAL

## 2018-08-31 LAB
FINAL DIAGNOSIS: NORMAL
HPV HR 12 DNA CVX QL NAA+PROBE: NEGATIVE
HPV16 DNA SPEC QL NAA+PROBE: NEGATIVE
HPV18 DNA SPEC QL NAA+PROBE: NEGATIVE
SPECIMEN DESCRIPTION: NORMAL
SPECIMEN SOURCE CVX/VAG CYTO: NORMAL

## 2018-09-21 ENCOUNTER — TELEPHONE (OUTPATIENT)
Dept: FAMILY MEDICINE | Facility: CLINIC | Age: 63
End: 2018-09-21

## 2018-09-21 DIAGNOSIS — I10 HTN, GOAL BELOW 140/90: ICD-10-CM

## 2018-09-21 NOTE — TELEPHONE ENCOUNTER
Faxed message from pharmacist   Pt is out of amLODIPine-benazepril (LOTREL) 5-20 MG per capsule  States she is supposed to take 2 capsules a day. If this is correct please send new Rx.    Dr. Antonio, was dose changed to 1 daily at 7/31/18 visit?  Or oversight?      Previous to 7/31 Sig   (Discontinued) 60 capsule 0 7/6/2018 7/31/2018 No   Sig: TAKE 1 CAPSULE BY MOUTH TWICE DAILY   Class: E-Prescribe     Tim Esquivel RN

## 2018-09-22 RX ORDER — AMLODIPINE AND BENAZEPRIL HYDROCHLORIDE 5; 20 MG/1; MG/1
1 CAPSULE ORAL 2 TIMES DAILY
Qty: 180 CAPSULE | Refills: 1 | Status: SHIPPED | OUTPATIENT
Start: 2018-09-22 | End: 2019-06-22

## 2018-09-22 NOTE — TELEPHONE ENCOUNTER
I'm really not sure, I have been getting these often, when the dose is changed.. It maybe my mistake, but I usually approve what sent to me.  So would you please ask the patient if she is taking 2 pills, if so, will keep it at 2 pills and refill it that way.  Gloria Antonio MD  [facility]  [phone]

## 2018-09-25 ENCOUNTER — TELEPHONE (OUTPATIENT)
Dept: FAMILY MEDICINE | Facility: CLINIC | Age: 63
End: 2018-09-25

## 2018-09-25 DIAGNOSIS — I10 HTN, GOAL BELOW 140/90: ICD-10-CM

## 2018-09-25 RX ORDER — AMLODIPINE AND BENAZEPRIL HYDROCHLORIDE 10; 40 MG/1; MG/1
1 CAPSULE ORAL DAILY
Qty: 90 CAPSULE | Refills: 1 | Status: SHIPPED | OUTPATIENT
Start: 2018-09-25 | End: 2019-03-15

## 2018-09-25 NOTE — TELEPHONE ENCOUNTER
Unfortunately we can only go to the higher dose Lotrel 10-40  or else it will not be covered.  So we have to inform the patient about that, and see what does she want to do.  Gloria Antonio MD  [facility]  [phone]

## 2018-09-25 NOTE — TELEPHONE ENCOUNTER
Pt informed and agrees to   Signed Prescriptions:                        Disp   Refills    amLODIPine-benazepril (LOTREL) 10-40 MG pe*90 cap*1        Sig: Take 1 capsule by mouth daily  Authorizing Provider: BLAZE, AMER  Ordering User: TIM ESQUIVEL    She will call if side effects or BP not controlled.   Rx sent.  Pharmacy note to discontinue 5-20 BID.    Dr. Antonio verbal order.  We confirmed Kalani does not need BP recheck visit.    Tim Esquivel, NICK

## 2018-09-25 NOTE — TELEPHONE ENCOUNTER
[facility] pharmacy staff.  Insurance will not cover BID dosing.  They are recommending higher strength instead.  Pt reported to pharmacy she must take BID.   Please advise rationale for PA or change med?     Prior Authorization Retail Medication Request    Medication/Dose: amLODIPine-benazepril (LOTREL) 5-20 MG  BID  ICD code (if different than what is on RX):    Previously Tried and Failed:    Rationale:      Insurance Name:  popAD  Insurance ID:  [id number]      Pharmacy Information (if different than what is on RX)  Name:   Phone:   Tim Esquivel RN

## 2019-01-19 ENCOUNTER — OFFICE VISIT (OUTPATIENT)
Dept: URGENT CARE | Facility: URGENT CARE | Age: 64
End: 2019-01-19
Payer: COMMERCIAL

## 2019-01-19 VITALS
OXYGEN SATURATION: 99 % | BODY MASS INDEX: 29.45 KG/M2 | HEART RATE: 82 BPM | DIASTOLIC BLOOD PRESSURE: 78 MMHG | WEIGHT: 161 LBS | RESPIRATION RATE: 14 BRPM | SYSTOLIC BLOOD PRESSURE: 126 MMHG | TEMPERATURE: 98 F

## 2019-01-19 DIAGNOSIS — H61.23 BILATERAL IMPACTED CERUMEN: Primary | ICD-10-CM

## 2019-01-19 PROCEDURE — 99213 OFFICE O/P EST LOW 20 MIN: CPT | Mod: 25 | Performed by: FAMILY MEDICINE

## 2019-01-19 PROCEDURE — 69209 REMOVE IMPACTED EAR WAX UNI: CPT | Performed by: FAMILY MEDICINE

## 2019-01-19 NOTE — PROGRESS NOTES
Chief Complaint   Patient presents with     Urgent Care     Ear Problem     left ear plugged for a week having pressure, hixtory of issues in this ear since a kid        S: Nu Taylor is a 63 year old female who presents to the clinic today for recent difficulty with hearing and a possible cerumen impaction.  A plugged sensation is felt bilaterally. Symptoms have been ongoing for 1 week(s).  The patient reports no pain or injury.  Has been instilling ear wax cleaning drops    Past Medical History:   Diagnosis Date     Allergic rhinitis due to other allergen      HTN (hypertension)      Hyperlipidemia LDL goal <130 8/6/2008     Patient Active Problem List   Diagnosis     Actinic keratosis     Essential hypertension     Other abnormal heart sounds     Hallux rigidus     Pain in joint, shoulder region     Hyperlipidemia LDL goal <130     Advanced directives, counseling/discussion     HTN, goal below 140/90     Mitral valve stenosis, mild     Urinary frequency     DDD (degenerative disc disease), cervical     Seasonal allergic rhinitis     Trigeminal neuralgia     Complete tear of right rotator cuff       ALLERGIES:  No known drug allergies        Current Outpatient Medications on File Prior to Visit:  amLODIPine-benazepril (LOTREL) 10-40 MG per capsule Take 1 capsule by mouth daily   amLODIPine-benazepril (LOTREL) 5-20 MG per capsule Take 1 capsule by mouth 2 times daily   Multiple Vitamins-Minerals (CENTRUM PO)    simvastatin (ZOCOR) 20 MG tablet Take 1 tablet (20 mg) by mouth At Bedtime     No current facility-administered medications on file prior to visit.     Social History     Tobacco Use     Smoking status: Never Smoker     Smokeless tobacco: Never Used   Substance Use Topics     Alcohol use: Yes     Alcohol/week: 0.0 oz     Comment: occational drink       Family History   Problem Relation Age of Onset     Leukemia Maternal Grandmother      Diabetes Son         type 1     Family History Negative No family hx  of      C.A.D. No family hx of      Cancer - colorectal No family hx of          ROS:    CONSTITUTIONAL:NEGATIVE for fever, chills,   INTEGUMENTARY/SKIN: NEGATIVE for worrisome rashes,   EYES: NEGATIVE for vision changes or irritation  RESP:NEGATIVE for significant cough or SOB    O:  /78   Pulse 82   Temp 98  F (36.7  C) (Oral)   Resp 14   Wt 73 kg (161 lb)   LMP 10/22/2008   SpO2 99%   BMI 29.45 kg/m    General appearance:  Alert, no distress  Eyes:  HUNTER,  EOMI,  No erythema, no discharge  Ears:   A cerumen impaction is seen bilaterally  Following removal, both tympanic membranes are clear       TM Right with no erythema, purulence   TM Left with no erythema, purulence   Ear canals:  right  with  no swelling, erythema, tenderness       Left with   no swelling, erythema, tenderness  Throat:  No erythema, no swelling  Neck:  No adenopathy, supple, good ROM      ASSESS:  Bilateral impacted cerumen      - HC REMOVAL IMPACTED CERUMEN IRRIGATION/LVG UNILAT       OTC  cerumen drops were recommended to reduce the need for future removal of impacted cerumen.             Procedure note:   The cerumen was easily removed in clinic manually   with tap water irrigation.

## 2019-01-19 NOTE — PATIENT INSTRUCTIONS
Patient Education     Impacted Earwax     Inner ear structures including ear canal and eardrum.     Impacted earwax is a buildup of the natural wax in the ear (cerumen). Impacted earwax is very common. It can cause symptoms such as hearing loss. It can also make it difficult for a doctor to examine your ear.  Understanding earwax  Tiny glands in your ear make substances that combine with dead skin cells to form earwax. Earwax helps protect your ear canal from water, dirt, infection, and injury. Over time, earwax travels from the inner part of your ear canal to the entrance of the canal. Then it falls away naturally. But in some cases, it can t travel to the entrance of the canal. This may be because of a health condition or objects put in the ear. With age, earwax tends to become harder and less fluid. Older adults are more likely to have problems with earwax buildup.  What causes impacted earwax?  Earwax can build up because of many health conditions. Some cause a physical blockage. Others cause too much earwax to be made. Health conditions that can cause earwax buildup include:    Use of cotton swabs to clean deep in the ear canal    Bony blockage in the ear (osteoma or exostoses)    Infections, such as  infection of the outer ear (external otitis)    Skin disease, such as eczema    Autoimmune diseases, such as lupus    A narrowed ear canal from birth, chronic inflammation, or injury    Too much earwax because of injury    Too much earwax because of  water in the ear canal  Objects repeatedly placed in the ear can also cause impacted earwax. For example, putting cotton swabs in the ear may push the wax deeper into the ear. Over time, this may cause blockage. Hearing aids, swimming plugs, and swim molds can cause the same problem when used again and again.  In some cases, the cause of impacted earwax is not known.  Symptoms of impacted earwax  Excess earwax usually does not cause any symptoms, unless there is a  large amount of buildup. Then it may cause symptoms such as:    Hearing loss    Earache    Sense of ear fullness    Itching in the ear    Odor from the ear    Ear drainage    Dizziness    Ringing in the ears    Cough  Treatment for impacted earwax  If you don t have symptoms, you may not need treatment. Often, the earwax goes away on its own with time. If you have symptoms, you may have one or more treatments such as:    Eardrops to soften the earwax. This helps it leave the ear over time.    Rinsing (irrigation) of the ear canal with water. This is done in a doctor s office.    Removal of the earwax with small tools. This is also done in a doctor s office.  In rare cases, some treatments for earwax removal may cause complications such as:    Infection of the outer ear (otitis external)    Earache    Short-term hearing loss    Dizziness    Water trapped in the ear canal    Hole in the eardrum    Ringing in the ears    Bleeding from the ear  Talk with your healthcare provider about which risks apply most to you.  Don t use these at home  Healthcare providers do not advise use of ear candles or ear vacuum kits. These methods are not shown to work and may cause problems.   Preventing impacted earwax  You may not be able to prevent impacted earwax if you have a health condition that causes it, such as eczema. In other cases, you may be able to prevent earwax buildup by:    Using ear drops once a week    Having routine cleaning of the ear about every 6 months    Not using cotton swabs in the ear  When to call the healthcare provider  Call your healthcare provider if you have symptoms of impacted earwax. Also call right away if you have severe symptoms after earwax removal. These may include bleeding or severe ear pain.   Date Last Reviewed: 5/1/2017 2000-2018 [employer]. [address] [place] [address], [place] [address]. All rights reserved. This information is not intended as a substitute for professional  medical care. Always follow your healthcare professional's instructions.           Patient Education       Earwax, Home Treatment    Everyone produces earwax from the lining of the ear canal. It serves to lubricate and protect the ear. The wax that forms in the canal naturally moves toward the outside of the ear and falls out. Sometimes the ear canal may contain too much wax. This can cause a blockage and loss of hearing. Directions are given below for home treatment.  Home care  If your doctor has advised you to remove a wax blockage yourself, follow these directions:    Unless a medicine was prescribed, you may use an over-the-counter product made for clearing earwax. These contain carbamide peroxide. Lie down with the blocked ear facing upward. Apply one dropper full of medicine and wait a few minutes. Grasp the outer ear and wiggle it to help the solution enter the canal.    Lean over a sink or basin with the blocked ear facing downward. Use a bulb syringe filled with warm (not hot or cold) water to rinse the ear several times. Use gentle pressure only.    If you are having trouble draining the water out of your ear canal, put a few drops of rubbing alcohol (isopropyl alcohol) into the ear canal. This will help remove the remaining water.    Repeat this procedure once a day for up to three days, or until your hearing is back to normal. Do not use this treatment for more than three days in a row.  Don ts    Don t use cold water to rinse the ear. This will make you dizzy.    Don t perform this procedure if you have an ear infection.    Don t perform this procedure if you have a ruptured eardrum.    Don t use cotton swabs, matches, hairpins, keys, or other objects to  clean  the ear canal. This can cause infection of the ear canal or rupture the eardrum. Because of their size and shape, cotton swabs can push earwax deeper into the ear canal instead of removing it.  Follow-up care  Follow up with your health care  provider if you are not improving after three cleaning attempts, or as advised.  When to seek medical advice  Call your health care provider right away if any of these occur:    Worsening ear pain    Fever of 101 F (38.3 C) or higher, or as directed by your health care provider    Hearing does not return to normal after three days of treatment    Fluid drainage or bleeding from the ear canal    Swelling, redness, or tenderness of the outer ear    Headache, neck pain, or stiff neck    9554-9262 [employer]. [address] [place], [place] [address]. All rights reserved. This information is not intended as a substitute for professional medical care. Always follow your healthcare professional's instructions.

## 2019-03-15 DIAGNOSIS — I10 HTN, GOAL BELOW 140/90: ICD-10-CM

## 2019-03-19 RX ORDER — AMLODIPINE AND BENAZEPRIL HYDROCHLORIDE 10; 40 MG/1; MG/1
CAPSULE ORAL
Qty: 90 CAPSULE | Refills: 1 | Status: SHIPPED | OUTPATIENT
Start: 2019-03-19 | End: 2019-06-22

## 2019-06-19 DIAGNOSIS — G50.0 TRIGEMINAL NEURALGIA: ICD-10-CM

## 2019-06-20 NOTE — TELEPHONE ENCOUNTER
Patient states she hasn't need to use the medication for almost a whole year but now her face is acting up and she is taking 3 a day. She is leaving on Saturday for vacation and won't have enough while she is gone.  Lavinia Puga

## 2019-06-21 ENCOUNTER — NURSE TRIAGE (OUTPATIENT)
Dept: NURSING | Facility: CLINIC | Age: 64
End: 2019-06-21

## 2019-06-21 DIAGNOSIS — G50.0 TRIGEMINAL NEURALGIA: ICD-10-CM

## 2019-06-21 RX ORDER — OXCARBAZEPINE 300 MG/1
TABLET, FILM COATED ORAL
Qty: 90 TABLET | Refills: 0 | COMMUNITY
Start: 2019-06-21 | End: 2019-06-22

## 2019-06-21 NOTE — TELEPHONE ENCOUNTER
See below-you filled last prescription. Pt is leaving town tomorrow and needs today please. Pt on vacation for 2 weeks.          Order History   Outpatient   Date/Time Action Taken User Additional Information   04/14/17 1111 Pend Mildred Huang RN    04/14/17 1508 Gloria Watson MD Reorder from Order: [id number]   04/17/17 1259 Taking Flag Checked Mara Waite, PROMISE    05/30/17 1428 Taking Flag Checked Cheli Can    07/31/18 0741 Discontinue Jennifer Weems CMA Reason: Therapy completed   06/19/19 2016 Reorder Interface, Eprescribing To Order: [id number]   Outpatient Medication Detail      Disp Refills Start End BECKY   OXcarbazepine (TRILEPTAL) 300 MG tablet (Discontinued) 90 tablet 0 4/14/2017 7/31/2018 No   Sig: TAKE ONE TABLET BY MOUTH THREE TIMES DAILY   Patient not taking: Reported on 1/30/2018        Class: E-Prescribe   Reason for Discontinue: Therapy completed   Order: [id number]   E-Prescribing Status: Receipt confirmed by pharmacy (4/14/2017  3:08 PM CDT)   Printout Tracking     External Result Report   Pharmacy     [facility] [address] [place] [address]   This Order Has Been Discontinued     Order Status Reason By On   Discontinued Therapy completed Jennifer Weems CMA 7/31/18 0741          Associated Diagnoses     Trigeminal neuralgia [G50.0]       Source Order Set     Order Set Name Order ID    [id number]     Prescribing Provider's NPI: [id number]  Gloria Antonio   Warnings History     No Interaction Warnings Shown    Encounter     View Encounter          Order Information     Order ID: [id number]   Procedure/Medication: OXcarbazepine (TRILEPTAL) 300 MG tablet [41487]   No order transmittal information available.     Order may not have completed order transmittal.

## 2019-06-21 NOTE — TELEPHONE ENCOUNTER
[facility] sent e prescribe refill request, no recent refills on file, called [facility], pt requested, cannot find last refill on file, in discontinue file from [facility],  ? Pt resuming, they will call pt to discuss, send to prescribing MD, have pt call us if needed, will close and wait for pt to call    OXcarbazepine (TRILEPTAL) 300 MG tablet (Discontinued) 90 tablet 3 9/8/2016 5/30/2017 No   Sig - Route: Take 1 tablet (300 mg) by mouth 4 times daily - Oral   Class: Historical   Reason for Discontinue: Therapy completed     Mildred Huang, RN, BSN  Message handled by Nurse Triage.

## 2019-06-21 NOTE — TELEPHONE ENCOUNTER
Caller is  requesting guarantee that  [facility] provider will write  RX for Tryleptal   caller advised that it is up to discretion of   provider and that FNA will not discuss  this with the potential   provider   She will need a to be seen        Caller understands and will consider options   Hanh Don RN  FNA    Reason for Disposition    General information question, no triage required and triager able to answer question    Protocols used: INFORMATION ONLY CALL-A-

## 2019-06-21 NOTE — TELEPHONE ENCOUNTER
This is not appropriate, pt has hx of trigeminal neuralgia, I never treated her for it, and I don't know if she is having the TGN again or not, and trileptal is a serious medication, that needs to be monitored closely  So I 'm so sorry I will not refill the medicine  Gloria Antonio MD  [facility]  [phone]

## 2019-06-21 NOTE — TELEPHONE ENCOUNTER
Requested Prescriptions   Pending Prescriptions Disp Refills     OXcarbazepine (TRILEPTAL) 300 MG tablet [Pharmacy Med Name: OXcarbazepine 300MG TAB] 90 tablet 0     Sig: TAKE ONE TABLET BY MOUTH THREE TIMES DAILY       There is no refill protocol information for this order        Bea Nunez RN

## 2019-06-21 NOTE — TELEPHONE ENCOUNTER
Pt calling back to check on the status of her refill request.  Writer gave her the information below.  She verbalized understanding and had no further questions.     Livia Baldwin RN/FNA

## 2019-06-21 NOTE — TELEPHONE ENCOUNTER
Requested Prescriptions   Pending Prescriptions Disp Refills     OXcarbazepine (TRILEPTAL) 300 MG tablet [Pharmacy Med Name: OXcarbazepine 300MG TAB]  Medication may not be due for refill  Last Written Prescription Date:  6/21/2019  Last Fill Quantity: 90 tablet,  # refills: 0   Last office visit: 8/27/2018 with prescribing provider:  Paco   Future Office Visit:     90 tablet 0     Sig: TAKE ONE TABLET BY MOUTH THREE TIMES DAILY       There is no refill protocol information for this order

## 2019-06-22 ENCOUNTER — OFFICE VISIT (OUTPATIENT)
Dept: FAMILY MEDICINE | Facility: CLINIC | Age: 64
End: 2019-06-22
Payer: COMMERCIAL

## 2019-06-22 VITALS
SYSTOLIC BLOOD PRESSURE: 130 MMHG | DIASTOLIC BLOOD PRESSURE: 80 MMHG | HEART RATE: 79 BPM | WEIGHT: 161 LBS | HEIGHT: 62 IN | OXYGEN SATURATION: 99 % | BODY MASS INDEX: 29.63 KG/M2 | RESPIRATION RATE: 18 BRPM | TEMPERATURE: 97.8 F

## 2019-06-22 DIAGNOSIS — G50.0 TRIGEMINAL NEURALGIA: ICD-10-CM

## 2019-06-22 DIAGNOSIS — E78.5 HYPERLIPIDEMIA LDL GOAL <130: Primary | ICD-10-CM

## 2019-06-22 DIAGNOSIS — I10 HTN, GOAL BELOW 140/90: ICD-10-CM

## 2019-06-22 PROCEDURE — 99213 OFFICE O/P EST LOW 20 MIN: CPT | Performed by: PHYSICIAN ASSISTANT

## 2019-06-22 RX ORDER — AMLODIPINE AND BENAZEPRIL HYDROCHLORIDE 10; 40 MG/1; MG/1
CAPSULE ORAL
Qty: 90 CAPSULE | Refills: 3 | Status: CANCELLED | OUTPATIENT
Start: 2019-06-22

## 2019-06-22 RX ORDER — AMLODIPINE AND BENAZEPRIL HYDROCHLORIDE 10; 40 MG/1; MG/1
CAPSULE ORAL
Qty: 90 CAPSULE | Refills: 1 | Status: SHIPPED | OUTPATIENT
Start: 2019-06-22 | End: 2019-08-29

## 2019-06-22 RX ORDER — SIMVASTATIN 20 MG
20 TABLET ORAL AT BEDTIME
Qty: 90 TABLET | Refills: 3 | Status: CANCELLED | OUTPATIENT
Start: 2019-06-22

## 2019-06-22 RX ORDER — OXCARBAZEPINE 300 MG/1
300 TABLET, FILM COATED ORAL 3 TIMES DAILY
Qty: 90 TABLET | Refills: 3 | Status: SHIPPED | OUTPATIENT
Start: 2019-06-22 | End: 2019-07-08

## 2019-06-22 ASSESSMENT — MIFFLIN-ST. JEOR: SCORE: 1243.54

## 2019-06-22 NOTE — NURSING NOTE
"Chief Complaint   Patient presents with     Recheck Medication     /80   Pulse 79   Temp 97.8  F (36.6  C) (Tympanic)   Resp 18   Ht 1.575 m (5' 2\")   Wt 73 kg (161 lb)   LMP 10/22/2008 (Exact Date)   SpO2 99%   Breastfeeding? No   BMI 29.45 kg/m   Estimated body mass index is 29.45 kg/m  as calculated from the following:    Height as of this encounter: 1.575 m (5' 2\").    Weight as of this encounter: 73 kg (161 lb).  BP completed using cuff size: regular   Shanti Alcantara CMA    Health Maintenance Due   Topic Date Due     ZOSTER IMMUNIZATION (1 of 2) 08/28/2006     ADVANCE CARE PLANNING  10/18/2016     PHQ-2  01/01/2019     Health Maintenance reviewed at today's visit patient asked to schedule/complete:   Immunizations:  Patient agrees to schedule    "

## 2019-06-22 NOTE — PATIENT INSTRUCTIONS
Patient Education     Trigeminal Neuralgia    You have trigeminal neuralgia. This is pain caused by irritation of the trigeminal nerve on your face. Symptoms include sudden, sharp pain in your head or face. It may feel like an electric shock. It can last for several seconds or minutes. It usually happens on only 1 side of your face. Pain may be triggered by things like moving your jaw or a touch on the skin of your face. The pain may be caused by something irritating the trigeminal nerve, such as a blood vessel pressing against it. But the exact cause of this problem often isn t known. Although it can be quite painful, the condition isn t dangerous.  Trigeminal neuralgia is often treated with medicines. These include anti-seizure medicines or antidepressants. Certain other treatments may also help. In some cases, you may need surgery.  Home care  Your healthcare provider may prescribe medicines to help relieve and prevent pain. Take all medicines as directed. Please note that it may take several changes in dose and medicines before the right combination is found that controls the pain.  General care:    Plan to rest at home today.    Avoid any specific activities that seem to trigger the pain.    Over the next few weeks, keep a pain diary. Write down when your symptoms happen and how they feel. Certain activities such as touching your face, chewing, talking, or brushing your teeth may bring on the pain. Cold air can also trigger the pain. Make sure you write down any triggers and discuss these with your healthcare provider. This will help guide treatment.  Follow-up care  Follow up with your healthcare provider, or as advised. If you were referred to a neurologist, be sure to make an appointment.  For more information on your condition, visit:    [employer] www.fpa-support.org  When to seek medical advice  Call your healthcare provider right away if any of these occur:    Fever of 100.4 F (38 C) or  higher, or as advised    Headache with very stiff neck    You aren t able to keep liquids down (repeated vomiting)    Extreme drowsiness or confusion    Dizziness or fainting    A new feeling of weakness or numbness or tingling in your arm, leg, or face    Difficulty speaking or seeing  Date Last Reviewed: 8/1/2016 2000-2018 [employer]. [address] [place], [place] [address]. All rights reserved. This information is not intended as a substitute for professional medical care. Always follow your healthcare professional's instructions.

## 2019-06-22 NOTE — PROGRESS NOTES
Subjective     Nu Taylor is a 63 year old female who presents to clinic today for the following health issues:    HPI     Hyperlipidemia Follow-Up      Are you having any of the following symptoms? (Select all that apply)  No complaints of shortness of breath, chest pain or pressure.  No increased sweating or nausea with activity.  No left-sided neck or arm pain.  No complaints of pain in calves when walking 1-2 blocks.    Are you regularly taking any medication or supplement to lower your cholesterol?   Yes- Simvistatin    Are you having muscle aches or other side effects that you think could be caused by your cholesterol lowering medication?  No    Hypertension Follow-up      Do you check your blood pressure regularly outside of the clinic? No     Are you following a low salt diet? Yes    Are your blood pressures ever more than 140 on the top number (systolic) OR more   than 90 on the bottom number (diastolic), for example 140/90? No       Amount of exercise or physical activity: 6-7 days/week for an average of 45-60 minutes    Problems taking medications regularly: No    Medication side effects: none    Diet: regular (no restrictions)  BP Readings from Last 3 Encounters:   06/22/19 130/80   01/19/19 126/78   08/27/18 130/80    Wt Readings from Last 3 Encounters:   06/22/19 73 kg (161 lb)   01/19/19 73 kg (161 lb)   08/27/18 73.3 kg (161 lb 11.2 oz)         Reviewed and updated as needed this visit by Provider  Tobacco  Allergies  Meds  Problems  Med Hx  Surg Hx  Fam Hx  Soc Hx          Additional complaints: Trigeminal Neuralgia, going out of town and is out of medication.  Severe pain on lower right side, cannot eat due to pain.    HPI additional notes: Nu presents today with   Chief Complaint   Patient presents with     Recheck Medication            Review of Systems   C: Negative for fever, chills, recent change in weight  Skin: Negative for worrisome rashes or lesions  ENT: Negative for ear,  "mouth and throat problems  Resp: Negative for significant cough or SOB  CV: Negative for chest pain or peripheral edema  GI: Negative for nausea, abdominal pain, heartburn, or change in bowel habits  MS: Negative for significant arthralgias or myalgias  NEURO: POSITIVE for trigeminal neuralgia.   P: Negative for changes in mood or affect  ROS all other systems negative.        Objective    /80   Pulse 79   Temp 97.8  F (36.6  C) (Tympanic)   Resp 18   Ht 1.575 m (5' 2\")   Wt 73 kg (161 lb)   LMP 10/22/2008 (Exact Date)   SpO2 99%   Breastfeeding? No   BMI 29.45 kg/m    Body mass index is 29.45 kg/m .  Physical Exam   GENERAL: healthy, alert, in no acute distress  MS: tenderness to palpation of right trigeminal nerve, third branch  SKIN: no suspicious lesions, no rashes  PSYCH: Alert and oriented times 3;  Able to articulate logical thoughts. Affect is normal.    Diagnostic test results: none         Assessment & Plan       ICD-10-CM    1. Hyperlipidemia LDL goal <130 E78.5    2. Trigeminal neuralgia G50.0 OXcarbazepine (TRILEPTAL) 300 MG tablet   3. HTN, goal below 140/90 I10 amLODIPine-benazepril (LOTREL) 10-40 MG capsule       Please see patient instructions for treatment details.    Return in about 3 months (around 9/22/2019) for Annual Exam.    Samina Bone PA-C  [facility]      "

## 2019-06-24 RX ORDER — OXCARBAZEPINE 300 MG/1
TABLET, FILM COATED ORAL
Refills: 0 | OUTPATIENT
Start: 2019-06-24

## 2019-06-25 ENCOUNTER — TELEPHONE (OUTPATIENT)
Dept: FAMILY MEDICINE | Facility: CLINIC | Age: 64
End: 2019-06-25

## 2019-06-25 DIAGNOSIS — G50.0 TRIGEMINAL NEURALGIA: ICD-10-CM

## 2019-06-25 DIAGNOSIS — G50.0 TRIGEMINAL NEURALGIA: Primary | ICD-10-CM

## 2019-06-25 RX ORDER — HYDROCODONE BITARTRATE AND ACETAMINOPHEN 5; 325 MG/1; MG/1
1 TABLET ORAL EVERY 6 HOURS PRN
Qty: 20 TABLET | Refills: 0 | Status: SHIPPED | OUTPATIENT
Start: 2019-06-25 | End: 2019-07-17

## 2019-06-25 RX ORDER — CARBAMAZEPINE 200 MG/1
200 CAPSULE, EXTENDED RELEASE ORAL DAILY
Qty: 60 CAPSULE | Refills: 1 | Status: SHIPPED | OUTPATIENT
Start: 2019-06-25 | End: 2019-07-08

## 2019-06-25 RX ORDER — HYDROCODONE BITARTRATE AND ACETAMINOPHEN 5; 325 MG/1; MG/1
1 TABLET ORAL EVERY 6 HOURS PRN
Qty: 20 TABLET | Refills: 0 | Status: SHIPPED | OUTPATIENT
Start: 2019-06-25 | End: 2019-06-25

## 2019-06-25 NOTE — TELEPHONE ENCOUNTER
"Requested Prescriptions   Pending Prescriptions Disp Refills     amLODIPine-benazepril (LOTREL) 10-40 MG capsule [Pharmacy Med Name: AMLOD/BENAZ 10-40MG CAP]  Last Written Prescription Date:  9/25/2018  Last Fill Quantity: 90 capsule,  # refills: 1   Last office visit: 8/27/2018 with prescribing provider:  Paco   Future Office Visit:     90 capsule 1     Sig: TAKE 1 CAPSULE BY MOUTH ONCE DAILY    Calcium Channel Blockers Protocol  Passed - 3/15/2019  4:45 PM       Passed - Blood pressure under 140/90 in past 12 months    BP Readings from Last 3 Encounters:   01/19/19 126/78   08/27/18 130/80   07/31/18 125/78                Passed - Recent (12 mo) or future (30 days) visit within the authorizing provider's specialty    Patient had office visit in the last 12 months or has a visit in the next 30 days with authorizing provider or within the authorizing provider's specialty.  See \"Patient Info\" tab in inbasket, or \"Choose Columns\" in Meds & Orders section of the refill encounter.             Passed - Medication is active on med list       Passed - Patient is age 18 or older       Passed - No active pregnancy on record       Passed - Normal serum creatinine on file in past 12 months    Recent Labs   Lab Test 07/31/18  0823   CR 0.66            Passed - No positive pregnancy test in past 12 months          "
Prescription approved per [facility] Refill Protocol.  Tim Esquivel RN    
bed rails

## 2019-06-25 NOTE — TELEPHONE ENCOUNTER
Huddle with provider. Order cannot be sent from [place] to [place] by provider.     They are able to start the carbamazepine. They will try this medication for relief, if there is not relief they will go to an UC for assessment and possible short supply of narcotics.     Patient/ will call back if more assistance is needed from the clinic.

## 2019-06-25 NOTE — TELEPHONE ENCOUNTER
States that the pills she has been taking have not worked at this time. Increase in pain, spasms, and discomfort.     New medication, carbamazepine was sent to pharmacy.     Patient is currently in [place] on vacation.     Also requesting a short supply of narcotics, to get her through the pain she's in at this time.     This medication will need to be e-prescribed and sent to the pharmacy in [place], [place]  Pharm Number: [phone]  Fax: [phone]

## 2019-07-02 ENCOUNTER — TELEPHONE (OUTPATIENT)
Dept: FAMILY MEDICINE | Facility: CLINIC | Age: 64
End: 2019-07-02

## 2019-07-02 DIAGNOSIS — G50.0 TRIGEMINAL NEURALGIA: Primary | ICD-10-CM

## 2019-07-02 RX ORDER — BACLOFEN 20 MG/1
20 TABLET ORAL 3 TIMES DAILY
Qty: 9 TABLET | Refills: 0 | Status: SHIPPED | OUTPATIENT
Start: 2019-07-02 | End: 2019-07-08

## 2019-07-02 NOTE — TELEPHONE ENCOUNTER
Patient calling to update on her condition. States she went in to ER (in [place]) on 6/26 and was diagnosed with Trigeminal Neuralgia. They gave her baclofen 20 mg TID for 10 days. She says she will only have it through Saturday. She needs to drive back from [place] this weekend and is concerned about the pain. The baclofen is helping, but she can already tell it may take longer to resolve. She is requesting provider extend her dose for at least 2 days so she can make it to her appointment on 7/8. Pended order, prefers [facility] in [place], [place].

## 2019-07-08 ENCOUNTER — OFFICE VISIT (OUTPATIENT)
Dept: FAMILY MEDICINE | Facility: CLINIC | Age: 64
End: 2019-07-08
Payer: COMMERCIAL

## 2019-07-08 VITALS
SYSTOLIC BLOOD PRESSURE: 126 MMHG | BODY MASS INDEX: 29.63 KG/M2 | TEMPERATURE: 98.3 F | RESPIRATION RATE: 14 BRPM | OXYGEN SATURATION: 98 % | DIASTOLIC BLOOD PRESSURE: 76 MMHG | WEIGHT: 162 LBS | HEART RATE: 69 BPM

## 2019-07-08 DIAGNOSIS — G50.0 TRIGEMINAL NEURALGIA: Primary | ICD-10-CM

## 2019-07-08 PROCEDURE — 99214 OFFICE O/P EST MOD 30 MIN: CPT | Performed by: PHYSICIAN ASSISTANT

## 2019-07-08 RX ORDER — CARBAMAZEPINE 200 MG/1
400 CAPSULE, EXTENDED RELEASE ORAL 2 TIMES DAILY
Qty: 180 CAPSULE | Refills: 1 | Status: SHIPPED | OUTPATIENT
Start: 2019-07-08 | End: 2019-07-17

## 2019-07-08 RX ORDER — BACLOFEN 20 MG/1
20 TABLET ORAL 4 TIMES DAILY
Qty: 60 TABLET | Refills: 1 | Status: SHIPPED | OUTPATIENT
Start: 2019-07-08 | End: 2019-07-17

## 2019-07-08 NOTE — PROGRESS NOTES
Subjective     Nu Taylor is a 63 year old female who presents to clinic today for the following health issues:    HPI   Facial pain/Trigeminal neuralgia      Duration: 6/26/19    Description (location/character/radiation): spasm in face on right side near mouth, hard to talk because the pain is severe. Mouth area mostly but starting to radiate into her jaw    Intensity:  Moderate to severe    Accompanying signs and symptoms: see above    History (similar episodes/previous evaluation): None    Precipitating or alleviating factors: None    Therapies tried and outcome: baclofen and norco         Reviewed and updated as needed this visit by Provider  Tobacco  Allergies  Meds  Problems  Med Hx  Surg Hx  Fam Hx  Soc Hx          Additional complaints: None    HPI additional notes: Nu presents today with   Chief Complaint   Patient presents with     Facial Pain   [place], was started on baclofen tid which she has been taking bid and norco which she has only taken one of.  Has been taking the carbatrol 200 mg bid as prescribed.  Pain is not bad unless she has to open her mouth to eat or talk.  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         Review of Systems   C: Negative for fever, chills, recent change in weight  Skin: Negative for worrisome rashes or lesions  ENT: Negative for ear, mouth and throat problems  Resp: Negative for significant cough or SOB  CV: Negative for chest pain or peripheral edema  GI: Negative for nausea, abdominal pain, heartburn, or change in bowel habits  MS: Positive for face and mouth pain  P: Negative for changes in mood or affect  ROS all other systems negative.        Objective    /76   Pulse 69   Temp 98.3  F (36.8  C) (Tympanic)   Resp 14   Wt 73.5 kg (162 lb)   LMP 10/22/2008 (Exact Date)   SpO2 98%   BMI 29.63 kg/m    Body mass index is 29.63 kg/m .  Physical Exam   GENERAL: healthy, alert, in no  acute distress  HENT: Mucous mebranes moist.  MS: tenderness to palpation of T3 nerve distribution on right side  SKIN: no suspicious lesions, no rashes  PSYCH: Alert and oriented times 3;  Able to articulate logical thoughts. Affect is normal.    Diagnostic test results: none         Assessment & Plan       ICD-10-CM    1. Trigeminal neuralgia G50.0 baclofen (LIORESAL) 20 MG tablet     carBAMazepine (CARBATROL) 200 MG 12 hr capsule     Trigeminal neuralgia  - Titrate carbamazepine up to 600 mg bid.  - Increase baclofen to qid.  - Not using norco, will save for at night if needed.  - If having too much pain when talking at work, note provided stating she might need to miss the next three days.    Please see patient instructions for treatment details.    Return in about 2 weeks (around 7/22/2019) for Recheck if not improving, phone call to clinic.    Samina Bone PA-C  [facility]

## 2019-07-08 NOTE — LETTER
July 8, 2019      Nu Taylor  [address] [address] [place] [id number]        To Whom It May Concern,       Was seen today for injury/illness.  She has significant pain and needs to be excused from work for up to the next three days.      Sincerely,        Samina Bone PA-C

## 2019-07-08 NOTE — PATIENT INSTRUCTIONS
Patient Education     Trigeminal Neuralgia    You have trigeminal neuralgia. This is pain caused by irritation of the trigeminal nerve on your face. Symptoms include sudden, sharp pain in your head or face. It may feel like an electric shock. It can last for several seconds or minutes. It usually happens on only 1 side of your face. Pain may be triggered by things like moving your jaw or a touch on the skin of your face. The pain may be caused by something irritating the trigeminal nerve, such as a blood vessel pressing against it. But the exact cause of this problem often isn t known. Although it can be quite painful, the condition isn t dangerous.  Trigeminal neuralgia is often treated with medicines. These include anti-seizure medicines or antidepressants. Certain other treatments may also help. In some cases, you may need surgery.  Home care  Your healthcare provider may prescribe medicines to help relieve and prevent pain. Take all medicines as directed. Please note that it may take several changes in dose and medicines before the right combination is found that controls the pain.  General care:    Plan to rest at home today.    Avoid any specific activities that seem to trigger the pain.    Over the next few weeks, keep a pain diary. Write down when your symptoms happen and how they feel. Certain activities such as touching your face, chewing, talking, or brushing your teeth may bring on the pain. Cold air can also trigger the pain. Make sure you write down any triggers and discuss these with your healthcare provider. This will help guide treatment.  Follow-up care  Follow up with your healthcare provider, or as advised. If you were referred to a neurologist, be sure to make an appointment.  For more information on your condition, visit:    Facial Pain Association www.fpa-support.org  When to seek medical advice  Call your healthcare provider right away if any of these occur:    Fever of 100.4 F (38 C) or  higher, or as advised    Headache with very stiff neck    You aren t able to keep liquids down (repeated vomiting)    Extreme drowsiness or confusion    Dizziness or fainting    A new feeling of weakness or numbness or tingling in your arm, leg, or face    Difficulty speaking or seeing  Date Last Reviewed: 8/1/2016 2000-2018 [employer] [address] [place], [place] [address]. All rights reserved. This information is not intended as a substitute for professional medical care. Always follow your healthcare professional's instructions.

## 2019-07-12 ENCOUNTER — TELEPHONE (OUTPATIENT)
Dept: FAMILY MEDICINE | Facility: CLINIC | Age: 64
End: 2019-07-12

## 2019-07-12 NOTE — TELEPHONE ENCOUNTER
Reason for Call:  Other call back    Detailed comments: Nu requests a call back about her medication for Trigeminal Neuralgia.  She is experience spasms and tiredness.    Phone Number Patient can be reached at: Cell number on file:    Telephone Information:   Mobile [phone]       Best Time: any    Can we leave a detailed message on this number? YES    Call taken on 7/12/2019 at 12:07 PM by Jennie Magallon

## 2019-07-12 NOTE — TELEPHONE ENCOUNTER
"Patient calling stating she is having side effects from medications. She is experiencing dizziness, can't talk (mouth hurting), \"feeling drunk\", and also states she found herself falling asleep while driving. She says she is so tired that she gets home and will fall asleep right away while sitting in the chair. She is concerned about this, as she works as a . She is okay for weekend, as she won't be driving, but is looking for provider advise on what she can do. She also expresses financial concerns - she had to pay $164 out of pocket for the baclofen and carbamazepine. Routing to provider to advise.  "

## 2019-07-15 NOTE — TELEPHONE ENCOUNTER
I would decrease the baclofen dose back down to tid and see if that helps as that is likely causing the side effects.

## 2019-07-15 NOTE — TELEPHONE ENCOUNTER
Pt called to report she had vomiting last night and is still having dizziness. She has not had medications this morning. She was given providers message below. She asked if any changes appropriate with Carbamazepine. Please advice.

## 2019-07-15 NOTE — TELEPHONE ENCOUNTER
Keep the carbazepine the same for now, I don't want to make too many changes at once.  She can decrease the dose slightly if she is still having significant symptoms.

## 2019-07-15 NOTE — TELEPHONE ENCOUNTER
Patient was called with provider's message. The dizziness started about the same time she increased carbamazepine to 3 capsules bid. She also noted tremors in the hands started last night. No more vomiting today, dizziness has slightly improved. She only took 2 capsules of carbamazepine this morning. She will go back to 2 capsules bid of cabamazepine and 1 tab three times daily for baclofen. Her face still has some numbness in it and painful to brush teeth. She does not plan to dive today. Pt also advised to increase her water intake as she only had 2 coffees and 1 orange juice today. Pt scheduled an OV for tomorrow with RADHA Tom.

## 2019-07-16 ENCOUNTER — OFFICE VISIT (OUTPATIENT)
Dept: FAMILY MEDICINE | Facility: CLINIC | Age: 64
End: 2019-07-16
Payer: COMMERCIAL

## 2019-07-16 VITALS
TEMPERATURE: 98.8 F | WEIGHT: 158 LBS | OXYGEN SATURATION: 97 % | BODY MASS INDEX: 28.9 KG/M2 | SYSTOLIC BLOOD PRESSURE: 132 MMHG | RESPIRATION RATE: 16 BRPM | DIASTOLIC BLOOD PRESSURE: 76 MMHG | HEART RATE: 77 BPM

## 2019-07-16 DIAGNOSIS — G50.0 TRIGEMINAL NEURALGIA: Primary | ICD-10-CM

## 2019-07-16 PROCEDURE — 99213 OFFICE O/P EST LOW 20 MIN: CPT | Performed by: PHYSICIAN ASSISTANT

## 2019-07-16 NOTE — PROGRESS NOTES
Subjective     Nu Taylor is a 63 year old female who presents to clinic today for the following health issues:    HPI   Medication Followup of carBAMazepine (CARBATROL) 200 MG 12 hr capsule    Taking Medication as prescribed: NO-adjusted due to side effects     Side Effects:  dizziness    Medication Helping Symptoms: Some help-having numbness, and hard time opening her mouth   Taking carbamazepine 2 in the morning 2 at night.  Decreased baclofen to tid, only took two yesterday because she took it so late.  Decreased dose seems to help.  Pain is moving to the top of her lip in addition to the lower lip.      Recent Labs   Lab Test 07/31/18  0823 05/30/17  1524 02/10/16  1028 10/09/15  1707 04/03/15  0804 10/14/13  0740 10/26/12  0739   * 122*  --   --  116 110 121   HDL 69 75  --   --  73 62 59   TRIG 85 76  --   --  47 52 63   ALT  --  26  --   --   --  40 35   CR 0.66 0.61  --   --   --  0.60 0.69   GFRESTIMATED >90 >90  Non African American GFR Calc    --   --   --  >90 88   GFRESTBLACK >90 >90  African American GFR Calc    --   --   --  >90 >90   POTASSIUM 3.9 4.1  --   --   --  3.5 4.0   TSH  --   --  0.96 1.30 0.93  --  1.28      BP Readings from Last 3 Encounters:   07/16/19 132/76   07/08/19 126/76   06/22/19 130/80    Wt Readings from Last 3 Encounters:   07/16/19 71.7 kg (158 lb)   07/08/19 73.5 kg (162 lb)   06/22/19 73 kg (161 lb)         Reviewed and updated as needed this visit by Provider  Tobacco  Allergies  Meds  Problems  Med Hx  Surg Hx  Fam Hx  Soc Hx          Additional complaints: None    HPI additional notes: Nu presents today with   Chief Complaint   Patient presents with     Medication Problem   Head is shaking, was having tremor in hands which has been better.  Is able to eat soft foods.           Review of Systems   C: Negative for fever, chills, recent change in weight  Skin: Negative for worrisome rashes or lesions  ENT: Negative for ear, mouth and throat  problems  Resp: Negative for significant cough or SOB  CV: Negative for chest pain or peripheral edema  GI: Negative for nausea, abdominal pain, heartburn, or change in bowel habits  MS: Positive for face and mouth pain  P: Negative for changes in mood or affect  ROS all other systems negative.        Objective    /76   Pulse 77   Temp 98.8  F (37.1  C) (Tympanic)   Resp 16   Wt 71.7 kg (158 lb)   LMP 10/22/2008 (Exact Date)   SpO2 97%   BMI 28.90 kg/m    Body mass index is 28.9 kg/m .  Physical Exam   GENERAL: healthy, alert, in no acute distress  HENT: Mucous mebranes moist.  MS: tenderness to palpation of T3 nerve distribution on right side  SKIN: no suspicious lesions, no rashes  PSYCH: Alert and oriented times 3;  Able to articulate logical thoughts. Affect is normal.      Diagnostic test results: none         Assessment & Plan       ICD-10-CM    1. Trigeminal neuralgia G50.0 NEUROLOGY ADULT REFERRAL     baclofen (LIORESAL) 20 MG tablet     carBAMazepine (CARBATROL) 200 MG 12 hr capsule     See dose adjustment which is helping with side effects. Will follow up with neurology for further evaluation.    Please see patient instructions for treatment details.    Return in about 1 week (around 7/23/2019) for Specialist Appt as referred.    Samina Bone PA-C  [facility]

## 2019-07-17 RX ORDER — BACLOFEN 20 MG/1
20 TABLET ORAL 3 TIMES DAILY
Qty: 60 TABLET | Refills: 1 | COMMUNITY
Start: 2019-07-17 | End: 2019-07-25

## 2019-07-17 RX ORDER — CARBAMAZEPINE 200 MG/1
400 CAPSULE, EXTENDED RELEASE ORAL DAILY
Qty: 180 CAPSULE | Refills: 1 | COMMUNITY
Start: 2019-07-17 | End: 2019-12-10

## 2019-07-17 NOTE — PATIENT INSTRUCTIONS
Patient Education     Trigeminal Neuralgia    You have trigeminal neuralgia. This is pain caused by irritation of the trigeminal nerve on your face. Symptoms include sudden, sharp pain in your head or face. It may feel like an electric shock. It can last for several seconds or minutes. It usually happens on only 1 side of your face. Pain may be triggered by things like moving your jaw or a touch on the skin of your face. The pain may be caused by something irritating the trigeminal nerve, such as a blood vessel pressing against it. But the exact cause of this problem often isn t known. Although it can be quite painful, the condition isn t dangerous.  Trigeminal neuralgia is often treated with medicines. These include anti-seizure medicines or antidepressants. Certain other treatments may also help. In some cases, you may need surgery.  Home care  Your healthcare provider may prescribe medicines to help relieve and prevent pain. Take all medicines as directed. Please note that it may take several changes in dose and medicines before the right combination is found that controls the pain.  General care:    Plan to rest at home today.    Avoid any specific activities that seem to trigger the pain.    Over the next few weeks, keep a pain diary. Write down when your symptoms happen and how they feel. Certain activities such as touching your face, chewing, talking, or brushing your teeth may bring on the pain. Cold air can also trigger the pain. Make sure you write down any triggers and discuss these with your healthcare provider. This will help guide treatment.  Follow-up care  Follow up with your healthcare provider, or as advised. If you were referred to a neurologist, be sure to make an appointment.  For more information on your condition, visit:    Facial Pain Association www.fpa-support.org  When to seek medical advice  Call your healthcare provider right away if any of these occur:    Fever of 100.4 F (38 C) or  higher, or as advised    Headache with very stiff neck    You aren t able to keep liquids down (repeated vomiting)    Extreme drowsiness or confusion    Dizziness or fainting    A new feeling of weakness or numbness or tingling in your arm, leg, or face    Difficulty speaking or seeing  Date Last Reviewed: 8/1/2016 2000-2018 [employer] [address] [place], [place] [id number]. All rights reserved. This information is not intended as a substitute for professional medical care. Always follow your healthcare professional's instructions.

## 2019-07-24 DIAGNOSIS — G50.0 TRIGEMINAL NEURALGIA: ICD-10-CM

## 2019-07-24 NOTE — TELEPHONE ENCOUNTER
Patient called to report she is doing much better. Pain free, but the right top part of her lip and bottom lip to the chin I numb. It's hard to brush her teeth and can't open mouth too wide to eat. She will call nuerology tomorrow to scheduled an appt. She does need a refill on baclofen.     baclofen (LIORESAL) 20 MG tablet  Last Written Prescription Date: 7/17/19 Historical   Last Fill Quantity: 60,  # refills: 1  Last office visit: 7/16/2019 with prescribing provider: RADHA Tom   Future Office Visit:

## 2019-07-25 RX ORDER — BACLOFEN 20 MG/1
20 TABLET ORAL 3 TIMES DAILY
Qty: 60 TABLET | Refills: 1 | Status: SHIPPED | OUTPATIENT
Start: 2019-07-25 | End: 2019-08-29

## 2019-07-31 DIAGNOSIS — R51.9 FACIAL PAIN: Primary | ICD-10-CM

## 2019-08-02 DIAGNOSIS — R51.9 FACIAL PAIN: Primary | ICD-10-CM

## 2019-08-06 ENCOUNTER — ANCILLARY PROCEDURE (OUTPATIENT)
Dept: MRI IMAGING | Facility: CLINIC | Age: 64
End: 2019-08-06
Attending: NURSE PRACTITIONER
Payer: COMMERCIAL

## 2019-08-06 DIAGNOSIS — R51.9 FACIAL PAIN: ICD-10-CM

## 2019-08-06 RX ORDER — GADOBUTROL 604.72 MG/ML
0.1 INJECTION INTRAVENOUS ONCE
Status: COMPLETED | OUTPATIENT
Start: 2019-08-06 | End: 2019-08-06

## 2019-08-06 RX ADMIN — GADOBUTROL 7.17 ML: 604.72 INJECTION INTRAVENOUS at 09:18

## 2019-08-07 ENCOUNTER — OFFICE VISIT (OUTPATIENT)
Dept: NEUROSURGERY | Facility: CLINIC | Age: 64
End: 2019-08-07
Payer: COMMERCIAL

## 2019-08-07 VITALS
BODY MASS INDEX: 29.26 KG/M2 | SYSTOLIC BLOOD PRESSURE: 139 MMHG | HEIGHT: 62 IN | OXYGEN SATURATION: 95 % | DIASTOLIC BLOOD PRESSURE: 87 MMHG | WEIGHT: 159 LBS | HEART RATE: 77 BPM | RESPIRATION RATE: 16 BRPM

## 2019-08-07 DIAGNOSIS — G50.0 TRIGEMINAL NEURALGIA: Primary | ICD-10-CM

## 2019-08-07 ASSESSMENT — MIFFLIN-ST. JEOR: SCORE: 1234.47

## 2019-08-07 ASSESSMENT — PAIN SCALES - GENERAL: PAINLEVEL: MODERATE PAIN (5)

## 2019-08-07 NOTE — PATIENT INSTRUCTIONS
Patient to callback with decision concerning a procedure for TRIGEMINAL NEURALGIA.    Call Mara RN Care Coordinator     Thank you for using [facility]

## 2019-08-07 NOTE — LETTER
RE: Nu Taylor  [address] [facility] [id number]     Dear Colleague,    Thank you for referring your patient, Nu Taylor, to the [facility] NEUROSURGERY at [facility]. Please see a copy of my visit note below.    2019     Clinic Note    Reason for visit: facial pain    History of present illness:   64 y/o F w/ hx of HTN and HLD presents to clinic with facial pain for several years. The patient states that the pain started years ago and she was treated with  oxcarbazepine at that time, which achieve complete pain relief. The pain was primarily in a V2 distribution and of electric-shooting quality. She felt that she would also have right eye blurry vision accompanying the pain. The pain would only be present for a couple of weeks. She stopped taking the medication and noticed that the pain came back in . However, it would only last for a couple of days.   In 2019, the pain came back but presents in a V2/V3 distribution. Unfortunately, neither carbamazepine nor baclofen would work. Her dose of both medications were eventually increased but caused her to develop dizziness. The pain would be triggered by touching her face, brushing teeth or opening her mouths.    Denies weakness, change in vision, usually no headaches      Review of systems: 10 point ROS negative except for as detailed in HPI  Past Medical History:   Past Medical History:   Diagnosis Date     Allergic rhinitis due to other allergen      HTN (hypertension)      Hyperlipidemia LDL goal <130 2008     Surgical History:   Past Surgical History:   Procedure Laterality Date     C LIGATION,FALLOPIAN TUBE W/       LAPAROSCOPIC SUSPENSION BLADDER NECK       Medications:  Current Outpatient Medications   Medication     amLODIPine-benazepril (LOTREL) 10-40 MG capsule     baclofen (LIORESAL) 20 MG tablet     carBAMazepine (CARBATROL) 200 MG 12 hr capsule     Multiple Vitamins-Minerals  "(CENTRUM PO)     simvastatin (ZOCOR) 20 MG tablet     No current facility-administered medications for this visit.        Physical exam:   /87   Pulse 77   Resp 16   Ht 1.575 m (5' 2\")   Wt 72.1 kg (159 lb)   LMP 10/22/2008 (Exact Date)   SpO2 95%   BMI 29.08 kg/m       General: Awake and alert and in no acute distress.  Pulm: Breathing comfortably on room air  CN: Symmetric browlift, smile, tongue protrusion, palate elevation, and sternocleidomastoids. No dysarthria. Extraocular muscles are all intact. Pupils react bilaterally and equally  Coordination: Intact finger-nose-finger bilaterally. Symmetric rapid alternating movements in bilateral upper extremities   Motor: No pronator drift. Good muscle bulk throughout. 5 out of 5 strength in bilateral upper and lower extremities  Gait: Intact tandem gait. Negative Romberg  Reflexes: 2+ reflexes bilateral biceps, brachioradialis, and patellar tendons. Colunga's reflex negative. Babinski reflex absent. Bilateral clonus absent.     Imaging:  MRI head (8/6/2019)  1. Idiopathic trigeminal neuralgia  2. Leftward deviation of the pituitary infundibulum is suggestive of  right sellar pituitary adenoma. Dedicated MRI of the sella could be  considered for further evaluation if clinically indicated.  3. Small left scalp lipoma.    Assessment:  62 y/o F w/ hx of HTN and HLD presents with right V2-3 trigeminal neuralgia refractory to medical treatment.    Plan:  - microvascular decompression was offered. The advantages and disadvantages of different neurosurgical options were reviewed and discussed. Patient wants to discuss with family and will contact the neurosurgery office.    Patient seen and discussed with MD Alejandro Viveros MD  Neurosurgery, PGY-1    I have personally examined the patient, reviewed the imaging and reviewed and edited the resident's note and agree with the plan of care. My comments have been separately dictated. - Alejo BROWER" MD Raul      Service Date: 08/07/2019      Ms. Taylor is seen today because of trigeminal neuralgia.  A detailed summary of today's visit has been entered into the record by my resident, Alejandro Saravia, which I have reviewed, amended and incorporated into my own.      Briefly, Ms. Taylor has right V2 electric shock-like pain that lasts at most just a few minutes with each episode.  She had initially excellent relief with oxcarbazepine, and after she had not had pain for a while, she stopped taking the medication.  The pain recurred in 2018 but was intermittent and manageable.  This June it came back in V2 and V3 and she has been unable to get it under good control with medication.      She has a couple of atypical features that include some blurred vision in the right eye during the pain, but no other neurologic abnormalities have been identified.      PAST MEDICAL HISTORY, FAMILY HISTORY, SOCIAL HISTORY AND REVIEW OF SYSTEMS:  Documented in Dr. Saravia's note.      PHYSICAL EXAMINATION:  Her neurologic examination is unremarkable and is also documented in Dr. Saravia's note.      She had an MRI scan and we examined it carefully.  She does not have any structural lesions directly affecting the trigeminal nerve.      ASSESSMENT:  Right V2 and V3 trigeminal neuralgia.      RECOMMENDATIONS:  We discussed with her the various surgical options we have for treating trigeminal neuralgia that is medically refractory.  They include microvascular decompression, stereotactic radiofrequency trigeminal rhizotomy, stereotactic balloon compression rhizotomy and stereotactic radiosurgery.  We talked about each procedure in detail including the complications and the benefits of the various procedures.  I think she is a good candidate for microvascular decompression, but she is uncertain as to whether or not she wants to go through such a significant procedure.  She asked a number of excellent questions and she is going to give the  matter some thought and let us know what direction she would like to proceed with further management of her trigeminal neuralgia.      Alejo Carter MD

## 2019-08-07 NOTE — NURSING NOTE
Chief Complaint   Patient presents with     Facial Pain     [facility]- [place]       iTm Santamaria, EMT

## 2019-08-07 NOTE — PROGRESS NOTES
"2019     Clinic Note    Reason for visit: facial pain    History of present illness:   62 y/o F w/ hx of HTN and HLD presents to clinic with facial pain for several years. The patient states that the pain started years ago and she was treated with  oxcarbazepine at that time, which achieve complete pain relief. The pain was primarily in a V2 distribution and of electric-shooting quality. She felt that she would also have right eye blurry vision accompanying the pain. The pain would only be present for a couple of weeks. She stopped taking the medication and noticed that the pain came back in . However, it would only last for a couple of days.   In 2019, the pain came back but presents in a V2/V3 distribution. Unfortunately, neither carbamazepine nor baclofen would work. Her dose of both medications were eventually increased but caused her to develop dizziness. The pain would be triggered by touching her face, brushing teeth or opening her mouths.    Denies weakness, change in vision, usually no headaches      Review of systems: 10 point ROS negative except for as detailed in HPI  Past Medical History:   Past Medical History:   Diagnosis Date     Allergic rhinitis due to other allergen      HTN (hypertension)      Hyperlipidemia LDL goal <130 2008     Surgical History:   Past Surgical History:   Procedure Laterality Date     C LIGATION,FALLOPIAN TUBE W/       LAPAROSCOPIC SUSPENSION BLADDER NECK       Medications:  Current Outpatient Medications   Medication     amLODIPine-benazepril (LOTREL) 10-40 MG capsule     baclofen (LIORESAL) 20 MG tablet     carBAMazepine (CARBATROL) 200 MG 12 hr capsule     Multiple Vitamins-Minerals (CENTRUM PO)     simvastatin (ZOCOR) 20 MG tablet     No current facility-administered medications for this visit.        Physical exam:   /87   Pulse 77   Resp 16   Ht 1.575 m (5' 2\")   Wt 72.1 kg (159 lb)   LMP 10/22/2008 (Exact Date)   SpO2 95%   BMI " 29.08 kg/m      General: Awake and alert and in no acute distress.  Pulm: Breathing comfortably on room air  CN: Symmetric browlift, smile, tongue protrusion, palate elevation, and sternocleidomastoids. No dysarthria. Extraocular muscles are all intact. Pupils react bilaterally and equally  Coordination: Intact finger-nose-finger bilaterally. Symmetric rapid alternating movements in bilateral upper extremities   Motor: No pronator drift. Good muscle bulk throughout. 5 out of 5 strength in bilateral upper and lower extremities  Gait: Intact tandem gait. Negative Romberg  Reflexes: 2+ reflexes bilateral biceps, brachioradialis, and patellar tendons. Colunga's reflex negative. Babinski reflex absent. Bilateral clonus absent.       Imaging:  MRI head (8/6/2019)  1. Idiopathic trigeminal neuralgia  2. Leftward deviation of the pituitary infundibulum is suggestive of  right sellar pituitary adenoma. Dedicated MRI of the sella could be  considered for further evaluation if clinically indicated.  3. Small left scalp lipoma.      Assessment:  62 y/o F w/ hx of HTN and HLD presents with right V2-3 trigeminal neuralgia refractory to medical treatment.      Plan:  - microvascular decompression was offered. The advantages and disadvantages of different neurosurgical options were reviewed and discussed. Patient wants to discuss with family and will contact the neurosurgery office.        Patient seen and discussed with MD Alejandro Viveros MD  Neurosurgery, PGY-1    Answers for HPI/ROS submitted by the patient on 8/7/2019   General Symptoms: No  Skin Symptoms: No  HENT Symptoms: No  EYE SYMPTOMS: No  HEART SYMPTOMS: No  LUNG SYMPTOMS: No  INTESTINAL SYMPTOMS: No  URINARY SYMPTOMS: No  GYNECOLOGIC SYMPTOMS: No  BREAST SYMPTOMS: No  SKELETAL SYMPTOMS: No  BLOOD SYMPTOMS: No  NERVOUS SYSTEM SYMPTOMS: No  MENTAL HEALTH SYMPTOMS: No    I have personally examined the patient, reviewed the imaging and reviewed and edited  the resident's note and agree with the plan of care. My comments have been separately dictated. - Alejo Carter MD

## 2019-08-08 NOTE — PROGRESS NOTES
Service Date: 08/07/2019      Ms. Taylor is seen today because of trigeminal neuralgia.  A detailed summary of today's visit has been entered into the record by my resident, Alejandro Saravia, which I have reviewed, amended and incorporated into my own.      Briefly, Ms. Taylor has right V2 electric shock-like pain that lasts at most just a few minutes with each episode.  She had initially excellent relief with oxcarbazepine, and after she had not had pain for a while, she stopped taking the medication.  The pain recurred in 2018 but was intermittent and manageable.  This June it came back in V2 and V3 and she has been unable to get it under good control with medication.      She has a couple of atypical features that include some blurred vision in the right eye during the pain, but no other neurologic abnormalities have been identified.      PAST MEDICAL HISTORY, FAMILY HISTORY, SOCIAL HISTORY AND REVIEW OF SYSTEMS:  Documented in Dr. Saravia's note.      PHYSICAL EXAMINATION:  Her neurologic examination is unremarkable and is also documented in Dr. Saravia's note.      She had an MRI scan and we examined it carefully.  She does not have any structural lesions directly affecting the trigeminal nerve.      ASSESSMENT:  Right V2 and V3 trigeminal neuralgia.      RECOMMENDATIONS:  We discussed with her the various surgical options we have for treating trigeminal neuralgia that is medically refractory.  They include microvascular decompression, stereotactic radiofrequency trigeminal rhizotomy, stereotactic balloon compression rhizotomy and stereotactic radiosurgery.  We talked about each procedure in detail including the complications and the benefits of the various procedures.  I think she is a good candidate for microvascular decompression, but she is uncertain as to whether or not she wants to go through such a significant procedure.  She asked a number of excellent questions and she is going to give the matter some  thought and let us know what direction she would like to proceed with further management of her trigeminal neuralgia.      MD CAROLYN Cooper MD             D: 2019   T: 2019   MT: MANUELITO      Name:     HANNA BARKER   MRN:      [id number]        Account:      [id number]   :      1955           Service Date: 2019      Document: [id number]

## 2019-08-11 DIAGNOSIS — E78.5 HYPERLIPIDEMIA LDL GOAL <130: ICD-10-CM

## 2019-08-11 NOTE — LETTER
August 12, 2019      Nu MED Taylor  [id number] [address] [id number]        Dear Nu,     I received a request to refill your Simvastatin.   However you are due for an annual appointment including labs with your provider.      I did send in a one time refill for you.      You can schedule an appointment by calling the clinic at [phone] or through [facility].     If you have any further questions please let us know.    Have a nice day.    Sincerely,    NICK Morin - Care team of Gloria Antonio MD

## 2019-08-12 RX ORDER — SIMVASTATIN 20 MG
20 TABLET ORAL AT BEDTIME
Qty: 90 TABLET | Refills: 0 | Status: SHIPPED | OUTPATIENT
Start: 2019-08-12 | End: 2019-08-19

## 2019-08-12 NOTE — TELEPHONE ENCOUNTER
"Medication is being filled for 1 time refill only due to:  Patient needs to be seen because follow up is due .      7/31/18: lab [facility], your labs are showing very good cholesterol, your basic profile is normal, and your test for hepatitis C and HIV are negative.  Gloria Antonio MD    Instructions: Return in about 1 year (around 8/27/2019).       Last Written Prescription Date: 7/31/18  Last Fill Quantity: 90  # refills: 3   Last office visit: 7/16/2019 with prescribing provider:   Future Office Visit:        Requested Prescriptions   Pending Prescriptions Disp Refills     simvastatin (ZOCOR) 20 MG tablet [Pharmacy Med Name: SIMVASTATIN 20MG  TAB] 90 tablet 3     Sig: TAKE 1 TABLET BY MOUTH AT BEDTIME       Statins Protocol Failed - 8/11/2019 10:34 AM        Failed - LDL on file in past 12 months     Recent Labs   Lab Test 07/31/18  0823   *             Passed - No abnormal creatine kinase in past 12 months     No lab results found.             Passed - Recent (12 mo) or future (30 days) visit within the authorizing provider's specialty     Patient had office visit in the last 12 months or has a visit in the next 30 days with authorizing provider or within the authorizing provider's specialty.  See \"Patient Info\" tab in inbasket, or \"Choose Columns\" in Meds & Orders section of the refill encounter.              Passed - Medication is active on med list        Passed - Patient is age 18 or older        Passed - No active pregnancy on record        Passed - No positive pregnancy test in past 12 months        Karena Cruz RN [facility]    "

## 2019-08-15 ENCOUNTER — TELEPHONE (OUTPATIENT)
Dept: FAMILY MEDICINE | Facility: CLINIC | Age: 64
End: 2019-08-15

## 2019-08-15 NOTE — TELEPHONE ENCOUNTER
She can try decreasing her medications to once daily for a week and if she is still pain free she can stop.  She can then restart them if needed if the pain comes back.

## 2019-08-15 NOTE — TELEPHONE ENCOUNTER
Reason for Call:  Other call back    Detailed comments: Nu called back reporting she is doing well with recent medication, back to normal.    Baclofen: she is taking only twice a day    CarBAMazepine: she is taking 2 in morning 2 at night    Please return a call for further instructions on medications.    Phone Number Patient can be reached at: Cell number on file:    Telephone Information:   Mobile [phone]       Best Time: any    Can we leave a detailed message on this number? YES    Call taken on 8/15/2019 at 8:10 AM by Jennie Magallon

## 2019-08-15 NOTE — TELEPHONE ENCOUNTER
Patient Contact    Attempt # 1    Was call answered?  No.  Left message on voicemail with information to call the clinic back for a message from provider.     On call back: Please share PCP note below.

## 2019-08-16 DIAGNOSIS — E78.5 HYPERLIPIDEMIA LDL GOAL <130: ICD-10-CM

## 2019-08-16 NOTE — TELEPHONE ENCOUNTER
"Called patient to relay provider message.     1. Patient is currently taking 2 capsules BID. To taper - should patient decrease to only 2 capsules once daily? OR 1 capsule BID? Please clarify.    2. Also notified RN that she has been having bladder issues since starting medications. She states \"I know I don't have a bladder infection. I have to drink a lot of water, and then have to go to the bathroom every 30 min-1hr\". She states she did have an accident during her MRI since she wasn't able to go right away.    3. Patient had questions about simvastatin refill. Notified that this was completed - and that she is due for annual physical. She is unsure if she needs a pap at her age?      Routing to provider to advise.  "

## 2019-08-16 NOTE — TELEPHONE ENCOUNTER
Sorry, 2 capsules once daily for a week then 1 capsule once daily for a week.    We do paps until age 65 so she is due.    Sometimes the baclofen is used for bladder spasms and may be causing relaxing of the bladder muscles which may be making her half to go more frequently.

## 2019-08-17 NOTE — TELEPHONE ENCOUNTER
"Requested Prescriptions   Pending Prescriptions Disp Refills     simvastatin (ZOCOR) 20 MG tablet 90 tablet 0     Sig: Take 1 tablet (20 mg) by mouth At Bedtime Annual physical due with provider before next refill  Last Written Prescription Date:  8/12/2019  Last Fill Quantity: 90tablet,  # refills: 0   Last Office Visit: 7/16/2019 [place]  Future Office Visit:            Statins Protocol Failed - 8/16/2019  3:48 PM        Failed - LDL on file in past 12 months     Recent Labs   Lab Test 07/31/18  0823   *             Passed - No abnormal creatine kinase in past 12 months     No lab results found.             Passed - Recent (12 mo) or future (30 days) visit within the authorizing provider's specialty     Patient had office visit in the last 12 months or has a visit in the next 30 days with authorizing provider or within the authorizing provider's specialty.  See \"Patient Info\" tab in inbasket, or \"Choose Columns\" in Meds & Orders section of the refill encounter.              Passed - Medication is active on med list        Passed - Patient is age 18 or older        Passed - No active pregnancy on record        Passed - No positive pregnancy test in past 12 months          "

## 2019-08-19 RX ORDER — SIMVASTATIN 20 MG
20 TABLET ORAL AT BEDTIME
Qty: 90 TABLET | Refills: 0 | Status: SHIPPED | OUTPATIENT
Start: 2019-08-19 | End: 2019-08-29

## 2019-08-27 ENCOUNTER — TELEPHONE (OUTPATIENT)
Dept: FAMILY MEDICINE | Facility: CLINIC | Age: 64
End: 2019-08-27

## 2019-08-27 NOTE — TELEPHONE ENCOUNTER
Reason for Call:  Other call back    Detailed comments: The patient called and stated that there have been some changes in the issues that she is having. She stated that she has had to use up the rest of her Baclofen because she had some bad things happen on Sunday night. She would like a call back to discuss this as she has sent in a refill request as she will need the med filled tonight as she stated she will really need it.     Phone Number Patient can be reached at: Cell number on file:    Telephone Information:   Mobile [phone]       Best Time: Any    Can we leave a detailed message on this number? YES    Call taken on 8/27/2019 at 11:55 AM by Misti Perez

## 2019-08-27 NOTE — TELEPHONE ENCOUNTER
Left detailed message patient has 1 refill remaining on file and to contact pharmacy.    JEFFRY ChurchN, RN  [facility]

## 2019-08-29 ENCOUNTER — OFFICE VISIT (OUTPATIENT)
Dept: FAMILY MEDICINE | Facility: CLINIC | Age: 64
End: 2019-08-29
Payer: COMMERCIAL

## 2019-08-29 VITALS
HEIGHT: 62 IN | OXYGEN SATURATION: 97 % | TEMPERATURE: 99.1 F | DIASTOLIC BLOOD PRESSURE: 76 MMHG | WEIGHT: 156 LBS | HEART RATE: 84 BPM | SYSTOLIC BLOOD PRESSURE: 130 MMHG | RESPIRATION RATE: 16 BRPM | BODY MASS INDEX: 28.71 KG/M2

## 2019-08-29 DIAGNOSIS — Z00.00 ROUTINE GENERAL MEDICAL EXAMINATION AT A HEALTH CARE FACILITY: Primary | ICD-10-CM

## 2019-08-29 DIAGNOSIS — Z12.31 VISIT FOR SCREENING MAMMOGRAM: ICD-10-CM

## 2019-08-29 DIAGNOSIS — E78.5 HYPERLIPIDEMIA LDL GOAL <130: ICD-10-CM

## 2019-08-29 DIAGNOSIS — Z71.89 ADVANCED CARE PLANNING/COUNSELING DISCUSSION: ICD-10-CM

## 2019-08-29 DIAGNOSIS — I10 HTN, GOAL BELOW 140/90: ICD-10-CM

## 2019-08-29 DIAGNOSIS — G50.0 TRIGEMINAL NEURALGIA: ICD-10-CM

## 2019-08-29 DIAGNOSIS — R35.0 URINARY FREQUENCY: ICD-10-CM

## 2019-08-29 LAB
ALBUMIN UR-MCNC: NEGATIVE MG/DL
APPEARANCE UR: CLEAR
BILIRUB UR QL STRIP: NEGATIVE
COLOR UR AUTO: YELLOW
ERYTHROCYTE [DISTWIDTH] IN BLOOD BY AUTOMATED COUNT: 12.4 % (ref 10–15)
GLUCOSE UR STRIP-MCNC: NEGATIVE MG/DL
HCT VFR BLD AUTO: 36.2 % (ref 35–47)
HGB BLD-MCNC: 12.7 G/DL (ref 11.7–15.7)
HGB UR QL STRIP: NEGATIVE
KETONES UR STRIP-MCNC: NEGATIVE MG/DL
LEUKOCYTE ESTERASE UR QL STRIP: NEGATIVE
MCH RBC QN AUTO: 32.2 PG (ref 26.5–33)
MCHC RBC AUTO-ENTMCNC: 35.1 G/DL (ref 31.5–36.5)
MCV RBC AUTO: 92 FL (ref 78–100)
NITRATE UR QL: NEGATIVE
PH UR STRIP: 7 PH (ref 5–7)
PLATELET # BLD AUTO: 289 10E9/L (ref 150–450)
RBC # BLD AUTO: 3.94 10E12/L (ref 3.8–5.2)
SOURCE: NORMAL
SP GR UR STRIP: 1.01 (ref 1–1.03)
UROBILINOGEN UR STRIP-ACNC: 0.2 EU/DL (ref 0.2–1)
WBC # BLD AUTO: 4.7 10E9/L (ref 4–11)

## 2019-08-29 PROCEDURE — 80061 LIPID PANEL: CPT | Performed by: PHYSICIAN ASSISTANT

## 2019-08-29 PROCEDURE — 80053 COMPREHEN METABOLIC PANEL: CPT | Performed by: PHYSICIAN ASSISTANT

## 2019-08-29 PROCEDURE — 36415 COLL VENOUS BLD VENIPUNCTURE: CPT | Performed by: PHYSICIAN ASSISTANT

## 2019-08-29 PROCEDURE — 85027 COMPLETE CBC AUTOMATED: CPT | Performed by: PHYSICIAN ASSISTANT

## 2019-08-29 PROCEDURE — 81003 URINALYSIS AUTO W/O SCOPE: CPT | Performed by: PHYSICIAN ASSISTANT

## 2019-08-29 PROCEDURE — 99396 PREV VISIT EST AGE 40-64: CPT | Performed by: PHYSICIAN ASSISTANT

## 2019-08-29 RX ORDER — SIMVASTATIN 20 MG
20 TABLET ORAL AT BEDTIME
Qty: 90 TABLET | Refills: 3 | Status: SHIPPED | OUTPATIENT
Start: 2019-08-29 | End: 2020-11-27

## 2019-08-29 RX ORDER — BACLOFEN 20 MG/1
20 TABLET ORAL 3 TIMES DAILY
Qty: 90 TABLET | Refills: 3 | Status: SHIPPED | OUTPATIENT
Start: 2019-08-29 | End: 2020-03-12

## 2019-08-29 RX ORDER — AMLODIPINE AND BENAZEPRIL HYDROCHLORIDE 10; 40 MG/1; MG/1
CAPSULE ORAL
Qty: 90 CAPSULE | Refills: 3 | Status: SHIPPED | OUTPATIENT
Start: 2019-08-29 | End: 2020-09-08

## 2019-08-29 ASSESSMENT — PATIENT HEALTH QUESTIONNAIRE - PHQ9
SUM OF ALL RESPONSES TO PHQ QUESTIONS 1-9: 0
SUM OF ALL RESPONSES TO PHQ QUESTIONS 1-9: 0
10. IF YOU CHECKED OFF ANY PROBLEMS, HOW DIFFICULT HAVE THESE PROBLEMS MADE IT FOR YOU TO DO YOUR WORK, TAKE CARE OF THINGS AT HOME, OR GET ALONG WITH OTHER PEOPLE: NOT DIFFICULT AT ALL

## 2019-08-29 ASSESSMENT — MIFFLIN-ST. JEOR: SCORE: 1211.89

## 2019-08-29 NOTE — RESULT ENCOUNTER NOTE
Zeeshan Judge,    I just wanted to let you know that some of your lab results have been reviewed and are attached.    -Urine is normal.    Please let me know if you have any questions and have a great week!    Sincerely,    Kristal Bone PA-C    [facility]  [address]  [place] [address]  [phone] (p)

## 2019-08-29 NOTE — PROGRESS NOTES
SUBJECTIVE:   CC: Nu Taylor is an 64 year old woman who presents for preventive health visit.     Healthy Habits:     Getting at least 3 servings of Calcium per day:  Yes    Bi-annual eye exam:  NO    Dental care twice a year:  Yes    Sleep apnea or symptoms of sleep apnea:  None    Diet:  Regular (no restrictions)    Frequency of exercise:  4-5 days/week    Duration of exercise:  30-45 minutes    Medication side effects:  None    PHQ-2 Total Score: 0    Additional concerns today:  No    Trigeminal neuralgia has been intermittent over the last week.  Had been doing well with just one pill of each of her medications in the morning.  It was severe Monday night and Sunday morning.  Is back to taking the baclofen tid.      Increased urinary frequency, no pain.       Today's PHQ-2 Score:   PHQ-2 ( 1999 [employer]) 8/29/2019   Q1: Little interest or pleasure in doing things 3   Q2: Feeling down, depressed or hopeless 0   PHQ-2 Score 3   Q1: Little interest or pleasure in doing things Nearly every day   Q2: Feeling down, depressed or hopeless Not at all   PHQ-2 Score 3       Abuse: Current or Past(Physical, Sexual or Emotional)- No  Do you feel safe in your environment? Yes    Social History     Tobacco Use     Smoking status: Never Smoker     Smokeless tobacco: Never Used   Substance Use Topics     Alcohol use: Yes     Alcohol/week: 0.0 oz     Comment: occ     If you drink alcohol do you typically have >3 drinks per day or >7 drinks per week? No    Alcohol Use 8/29/2019   Prescreen: >3 drinks/day or >7 drinks/week? No   Prescreen: >3 drinks/day or >7 drinks/week? -   No flowsheet data found.    Reviewed orders with patient.  Reviewed health maintenance and updated orders accordingly - Yes  BP Readings from Last 3 Encounters:   08/29/19 130/76   08/07/19 139/87   07/16/19 132/76    Wt Readings from Last 3 Encounters:   08/29/19 70.8 kg (156 lb)   08/07/19 72.1 kg (159 lb)   07/16/19 71.7 kg (158 lb)                   Recent Labs   Lab Test 07/31/18  0823 05/30/17  1524 02/10/16  1028 10/09/15  1707 04/03/15  0804 10/14/13  0740 10/26/12  0739   * 122*  --   --  116 110 121   HDL 69 75  --   --  73 62 59   TRIG 85 76  --   --  47 52 63   ALT  --  26  --   --   --  40 35   CR 0.66 0.61  --   --   --  0.60 0.69   GFRESTIMATED >90 >90  Non African American GFR Calc    --   --   --  >90 88   GFRESTBLACK >90 >90  African American GFR Calc    --   --   --  >90 >90   POTASSIUM 3.9 4.1  --   --   --  3.5 4.0   TSH  --   --  0.96 1.30 0.93  --  1.28        Mammogram Screening: Patient over age 50, mutual decision to screen reflected in health maintenance.    Pertinent mammograms are reviewed under the imaging tab.  History of abnormal Pap smear: NO - age 30-65 PAP every 5 years with negative HPV co-testing recommended  PAP / HPV Latest Ref Rng & Units 8/27/2018 4/3/2015 10/18/2011   PAP - NIL NIL NIL   HPV 16 DNA NEG:Negative Negative Negative -   HPV 18 DNA NEG:Negative Negative Negative -   OTHER HR HPV NEG:Negative Negative Negative -     Reviewed and updated as needed this visit by clinical staff  Tobacco  Allergies  Meds  Problems  Med Hx  Surg Hx  Fam Hx  Soc Hx          Reviewed and updated as needed this visit by Provider  Meds  Problems          Review of Systems  CONSTITUTIONAL: NEGATIVE for fever, chills, change in weight  INTEGUMENTARY/SKIN: NEGATIVE for worrisome rashes, moles or lesions  EYES: NEGATIVE for vision changes or irritation  ENT: NEGATIVE for ear, mouth and throat problems  RESP: NEGATIVE for significant cough or SOB  BREAST: NEGATIVE for masses, tenderness or discharge  CV: NEGATIVE for chest pain, palpitations or peripheral edema  GI: NEGATIVE for nausea, abdominal pain, heartburn, or change in bowel habits   menopausal female: frequency and urgency  MUSCULOSKELETAL:POSITIVE  for right sided face pain  NEURO: NEGATIVE for weakness, dizziness or paresthesias  PSYCHIATRIC: NEGATIVE for changes  "in mood or affect      OBJECTIVE:   /76   Pulse 84   Temp 99.1  F (37.3  C) (Tympanic)   Resp 16   Ht 1.568 m (5' 1.75\")   Wt 70.8 kg (156 lb)   LMP 10/22/2008 (Exact Date)   SpO2 97%   BMI 28.76 kg/m    Physical Exam  GENERAL APPEARANCE: healthy, alert and no distress  EYES: Eyes grossly normal to inspection, PERRL and conjunctivae and sclerae normal  HENT: ear canals and TM's normal, nose and mouth without ulcers or lesions, oropharynx clear and oral mucous membranes moist  NECK: no adenopathy, no asymmetry, masses, or scars and thyroid normal to palpation  RESP: lungs clear to auscultation - no rales, rhonchi or wheezes  CV: regular rate and rhythm, normal S1 S2, no S3 or S4, no murmur, click or rub, no peripheral edema and peripheral pulses strong  ABDOMEN: soft, nontender, no hepatosplenomegaly, no masses and bowel sounds normal  MS: no musculoskeletal defects are noted and gait is age appropriate without ataxia  SKIN: no suspicious lesions or rashes  NEURO: Normal strength and tone, sensory exam grossly normal, mentation intact and speech normal  PSYCH: mentation appears normal and affect normal/bright    Diagnostic Test Results:  Results for orders placed or performed in visit on 08/29/19   UA reflex to Microscopic and Culture   Result Value Ref Range    Color Urine Yellow     Appearance Urine Clear     Glucose Urine Negative NEG^Negative mg/dL    Bilirubin Urine Negative NEG^Negative    Ketones Urine Negative NEG^Negative mg/dL    Specific Gravity Urine 1.010 1.003 - 1.035    Blood Urine Negative NEG^Negative    pH Urine 7.0 5.0 - 7.0 pH    Protein Albumin Urine Negative NEG^Negative mg/dL    Urobilinogen Urine 0.2 0.2 - 1.0 EU/dL    Nitrite Urine Negative NEG^Negative    Leukocyte Esterase Urine Negative NEG^Negative    Source Midstream Urine         ASSESSMENT/PLAN:       ICD-10-CM    1. Routine general medical examination at a health care facility Z00.00 Lipid panel reflex to direct LDL " "Fasting     CBC with platelets     Comprehensive metabolic panel   2. Visit for screening mammogram Z12.31 MA Screening Digital Bilateral   3. Hyperlipidemia LDL goal <130 E78.5 simvastatin (ZOCOR) 20 MG tablet   4. HTN, goal below 140/90 I10 amLODIPine-benazepril (LOTREL) 10-40 MG capsule   5. Trigeminal neuralgia G50.0 baclofen (LIORESAL) 20 MG tablet   6. Advanced care planning/counseling discussion Z71.89    7. Urinary frequency R35.0 UA reflex to Microscopic and Culture       COUNSELING:  Reviewed preventive health counseling, as reflected in patient instructions    Estimated body mass index is 28.76 kg/m  as calculated from the following:    Height as of this encounter: 1.568 m (5' 1.75\").    Weight as of this encounter: 70.8 kg (156 lb).    Weight management plan: Discussed healthy diet and exercise guidelines     reports that she has never smoked. She has never used smokeless tobacco.      Counseling Resources:  ATP IV Guidelines  Pooled Cohorts Equation Calculator  Breast Cancer Risk Calculator  FRAX Risk Assessment  ICSI Preventive Guidelines  Dietary Guidelines for Americans, 2010  USDA's MyPlate  ASA Prophylaxis  Lung CA Screening    Samina Bone PA-C  [facility]  Answers for HPI/ROS submitted by the patient on 8/29/2019   Annual Exam:  If you checked off any problems, how difficult have these problems made it for you to do your work, take care of things at home, or get along with other people?: Not difficult at all  PHQ9 TOTAL SCORE: 0    "

## 2019-08-30 LAB
ALBUMIN SERPL-MCNC: 4.1 G/DL (ref 3.4–5)
ALP SERPL-CCNC: 96 U/L (ref 40–150)
ALT SERPL W P-5'-P-CCNC: 32 U/L (ref 0–50)
ANION GAP SERPL CALCULATED.3IONS-SCNC: 7 MMOL/L (ref 3–14)
AST SERPL W P-5'-P-CCNC: 13 U/L (ref 0–45)
BILIRUB SERPL-MCNC: 0.3 MG/DL (ref 0.2–1.3)
BUN SERPL-MCNC: 9 MG/DL (ref 7–30)
CALCIUM SERPL-MCNC: 9.1 MG/DL (ref 8.5–10.1)
CHLORIDE SERPL-SCNC: 108 MMOL/L (ref 94–109)
CHOLEST SERPL-MCNC: 206 MG/DL
CO2 SERPL-SCNC: 28 MMOL/L (ref 20–32)
CREAT SERPL-MCNC: 0.62 MG/DL (ref 0.52–1.04)
GFR SERPL CREATININE-BSD FRML MDRD: >90 ML/MIN/{1.73_M2}
GLUCOSE SERPL-MCNC: 99 MG/DL (ref 70–99)
HDLC SERPL-MCNC: 73 MG/DL
LDLC SERPL CALC-MCNC: 111 MG/DL
NONHDLC SERPL-MCNC: 133 MG/DL
POTASSIUM SERPL-SCNC: 3.6 MMOL/L (ref 3.4–5.3)
PROT SERPL-MCNC: 7.7 G/DL (ref 6.8–8.8)
SODIUM SERPL-SCNC: 143 MMOL/L (ref 133–144)
TRIGL SERPL-MCNC: 111 MG/DL

## 2019-09-13 ENCOUNTER — NURSE TRIAGE (OUTPATIENT)
Dept: NURSING | Facility: CLINIC | Age: 64
End: 2019-09-13

## 2019-09-13 ENCOUNTER — OFFICE VISIT (OUTPATIENT)
Dept: FAMILY MEDICINE | Facility: CLINIC | Age: 64
End: 2019-09-13
Payer: COMMERCIAL

## 2019-09-13 VITALS
WEIGHT: 157 LBS | OXYGEN SATURATION: 100 % | HEART RATE: 77 BPM | HEIGHT: 62 IN | DIASTOLIC BLOOD PRESSURE: 70 MMHG | SYSTOLIC BLOOD PRESSURE: 120 MMHG | TEMPERATURE: 97.7 F | RESPIRATION RATE: 18 BRPM | BODY MASS INDEX: 28.89 KG/M2

## 2019-09-13 DIAGNOSIS — E66.3 OVERWEIGHT (BMI 25.0-29.9): ICD-10-CM

## 2019-09-13 DIAGNOSIS — N30.01 ACUTE CYSTITIS WITH HEMATURIA: Primary | ICD-10-CM

## 2019-09-13 PROBLEM — I10 BENIGN ESSENTIAL HYPERTENSION: Status: ACTIVE | Noted: 2019-09-13

## 2019-09-13 LAB
ALBUMIN UR-MCNC: ABNORMAL MG/DL
APPEARANCE UR: ABNORMAL
BACTERIA #/AREA URNS HPF: ABNORMAL /HPF
BILIRUB UR QL STRIP: NEGATIVE
COLOR UR AUTO: YELLOW
GLUCOSE UR STRIP-MCNC: NEGATIVE MG/DL
HGB UR QL STRIP: ABNORMAL
KETONES UR STRIP-MCNC: NEGATIVE MG/DL
LEUKOCYTE ESTERASE UR QL STRIP: ABNORMAL
NITRATE UR QL: NEGATIVE
PH UR STRIP: 8 PH (ref 5–7)
RBC #/AREA URNS AUTO: ABNORMAL /HPF
SOURCE: ABNORMAL
SP GR UR STRIP: 1.01 (ref 1–1.03)
UROBILINOGEN UR STRIP-ACNC: 0.2 EU/DL (ref 0.2–1)
WBC #/AREA URNS AUTO: ABNORMAL /HPF

## 2019-09-13 PROCEDURE — 87088 URINE BACTERIA CULTURE: CPT | Performed by: FAMILY MEDICINE

## 2019-09-13 PROCEDURE — 99213 OFFICE O/P EST LOW 20 MIN: CPT | Performed by: FAMILY MEDICINE

## 2019-09-13 PROCEDURE — 81001 URINALYSIS AUTO W/SCOPE: CPT | Performed by: FAMILY MEDICINE

## 2019-09-13 PROCEDURE — 87086 URINE CULTURE/COLONY COUNT: CPT | Performed by: FAMILY MEDICINE

## 2019-09-13 PROCEDURE — 87186 SC STD MICRODIL/AGAR DIL: CPT | Performed by: FAMILY MEDICINE

## 2019-09-13 RX ORDER — CIPROFLOXACIN 250 MG/1
250 TABLET, FILM COATED ORAL 2 TIMES DAILY
Qty: 6 TABLET | Refills: 0 | Status: SHIPPED | OUTPATIENT
Start: 2019-09-13 | End: 2019-12-10

## 2019-09-13 ASSESSMENT — MIFFLIN-ST. JEOR: SCORE: 1216.43

## 2019-09-13 NOTE — PROGRESS NOTES
"Subjective     Nu Taylor is a 64 year old female who presents to clinic today for the following health issues:    HPI     URINARY TRACT SYMPTOMS      Duration: 09/12/2019    Has about one a yr     Description  dysuria and hematuria    Intensity:  moderate    Accompanying signs and symptoms:  Fever/chills: no   Flank pain no   Nausea and vomiting: no   Vaginal symptoms: none  Abdominal/Pelvic Pain: YES    History  History of frequent UTI's: YES  History of kidney stones: no   Sexually Active: YES  Possibility of pregnancy: No    Precipitating or alleviating factors: None    Therapies tried and outcome: none   Outcome: NA      OVERWEIGHT    --BMI = 29  -active   -comorbid hi LDL , HTN           Reviewed and updated as needed this visit by Provider         Review of Systems   ROS COMP: CONSTITUTIONAL: NEGATIVE for fever, chills, change in weight  INTEGUMENTARY/SKIN: NEGATIVE for worrisome rashes, moles or lesions  EYES: NEGATIVE for vision changes or irritation  ENT/MOUTH: NEGATIVE for ear, mouth and throat problems  RESP: NEGATIVE for significant cough or SOB  BREAST: NEGATIVE for masses, tenderness or discharge  CV: NEGATIVE for chest pain, palpitations or peripheral edema  GI: NEGATIVE for nausea, abdominal pain, heartburn, or change in bowel habits   female: dysuria, frequency, hematuria and urgency  MUSCULOSKELETAL: NEGATIVE for significant arthralgias or myalgia  NEURO: NEGATIVE for weakness, dizziness or paresthesias  ENDOCRINE: NEGATIVE for temperature intolerance, skin/hair changes  HEME: NEGATIVE for bleeding problems  PSYCHIATRIC: NEGATIVE for changes in mood or affect      Objective    /70   Pulse 77   Temp 97.7  F (36.5  C) (Tympanic)   Resp 18   Ht 1.568 m (5' 1.75\")   Wt 71.2 kg (157 lb)   LMP 10/22/2008 (Exact Date)   SpO2 100%   Breastfeeding? No   BMI 28.95 kg/m    Body mass index is 28.95 kg/m .  Physical Exam   GENERAL: healthy, alert, going to the BR  q 20 min  distress and " "over weight  EYES: Eyes grossly normal to inspection, PERRL and conjunctivae and sclerae normal  RESP: lungs clear to auscultation - no rales, rhonchi or wheezes  CV: regular rate and rhythm, normal S1 S2, no S3 or S4, no murmur, click or rub, no peripheral edema and peripheral pulses strong  ABDOMEN: soft, nontender, no hepatosplenomegaly, no masses and bowel sounds normal  MS: no gross musculoskeletal defects noted, no edema  SKIN: no suspicious lesions or rashes  NEURO: Normal strength and tone, mentation intact and speech normal  PSYCH: mentation appears normal, affect normal/bright    Diagnostic Test Results:  Labs reviewed in Epic  Results for orders placed or performed in visit on 09/13/19   UA reflex to Microscopic and Culture   Result Value Ref Range    Color Urine Yellow     Appearance Urine Slightly Cloudy     Glucose Urine Negative NEG^Negative mg/dL    Bilirubin Urine Negative NEG^Negative    Ketones Urine Negative NEG^Negative mg/dL    Specific Gravity Urine 1.015 1.003 - 1.035    Blood Urine Large (A) NEG^Negative    pH Urine 8.0 (H) 5.0 - 7.0 pH    Protein Albumin Urine Trace (A) NEG^Negative mg/dL    Urobilinogen Urine 0.2 0.2 - 1.0 EU/dL    Nitrite Urine Negative NEG^Negative    Leukocyte Esterase Urine Large (A) NEG^Negative    Source Midstream Urine    Urine Microscopic   Result Value Ref Range    WBC Urine 10-25 (A) OTO5^0 - 5 /HPF    RBC Urine 25-50 (A) OTO2^O - 2 /HPF    Bacteria Urine Few (A) NEG^Negative /HPF           Assessment & Plan       ICD-10-CM    1. Acute cystitis with hematuria N30.01 Urine Culture Aerobic Bacterial   2. Overweight (BMI 25.0-29.9) E66.3         BMI:   Estimated body mass index is 28.95 kg/m  as calculated from the following:    Height as of this encounter: 1.568 m (5' 1.75\").    Weight as of this encounter: 71.2 kg (157 lb).   Weight management plan: Discussed healthy diet and exercise guidelines        Patient Instructions   1. You have  a bladder infection Please " increase your water intake  Do not drink cranberry juice as this does nothing different from water except cause weight gain.   We  Will notify you re the results of today's urine culture  You may need to return to be sure the infection is gone.  Oftentimes sexual intercourse causes the bacteria from the rectum to go into the bladder and cause infection.  To avoid this, please get up and go to the bathroom after intercourse and drink a large glass of water.  This allows the urine to wash out the infective bacteria and thus speed healing of an infection, and prevent one from occurring.  You may buy pyridium over the counter to help with the burning  It is a purple pill that turns the urine orange and helps with the burning     2.  Weight Loss Tips  1. Do not eat after 6 hrs before your expected bedtime  2. Have your heaviest meal for breakfast, a slightly lighter meal at lunch and a snack 6 hrs before bed  3. No sugar/calorie drinks except milk ie no fruit juice, pop, alcohol.  4. Drink milk 30min before meals to decrease your hunger. Also it is excellent as part of your last meal of the day snack  5. Drink lots of water  6. Increase fiber in diet: all bran cereal, salads, popcorn etc  7. Have only one small serving of fruit a day about 1/2 cup (as this is high in sugar)  8. EXERCISE is the bottom line. Without it, you will gain weight even on a low calorie diet. Best if done 2-3X a day as can    Being overweight contributes to high blood pressure and high cholesterol, both of which cause heart attacks, strokes and kidney failure, prediabetes and diabetes, arthritis, and liver disease     3. . Schedule your mammo at [facility]  At [address] at [phone]    In future as it is a better facility     4. Shingrex is a 2 shot series that prevents shingles 97% of the time, as opposed to the old shingles shot that only prevented it at 40-50%  It costs less for medicare at a pharmacy  You should get it  starting at 50 yrs old get the 2nd shot 5-6 mo after the first one        I  Explained the treatment and the reason for it     Will need f/u unit(s)/a to be sure the blood clears     Return in about 6 months (around 3/13/2020) for BP Recheck, cholesterol.    Kia Jj MD  [facility]

## 2019-09-13 NOTE — TELEPHONE ENCOUNTER
Kalani is having painful urination and blood in urine.  Denies fever.  Kalani feels that she has a bladder infection.      Reason for Disposition    Pain or burning with passing urine    Additional Information    Negative: Shock suspected (e.g., cold/pale/clammy skin, too weak to stand, low BP, rapid pulse)    Negative: Sounds like a life-threatening emergency to the triager    Negative: Recent back or abdominal injury    Negative: Recent genital injury    Negative: [1] Unable to urinate (or only a few drops) > 4 hours AND [2] bladder feels very full (e.g., palpable bladder or strong urge to urinate)    Negative: Passing pure blood or large blood clots (i.e., size > a dime) (Exception: perez or small strands)    Negative: Fever > 100.5 F (38.1 C)    Negative: Patient sounds very sick or weak to the triager    Negative: Known sickle cell disease    Negative: Taking Coumadin (warfarin) or other strong blood thinner, or known bleeding disorder (e.g., thrombocytopenia)    Negative: Side (flank) or back pain present    Protocols used: URINE - BLOOD IN-A-AH

## 2019-09-13 NOTE — NURSING NOTE
"Chief Complaint   Patient presents with     Urinary Pain     /70   Pulse 77   Temp 97.7  F (36.5  C) (Tympanic)   Resp 18   Ht 1.568 m (5' 1.75\")   Wt 71.2 kg (157 lb)   LMP 10/22/2008 (Exact Date)   SpO2 100%   Breastfeeding? No   BMI 28.95 kg/m   Estimated body mass index is 28.95 kg/m  as calculated from the following:    Height as of this encounter: 1.568 m (5' 1.75\").    Weight as of this encounter: 71.2 kg (157 lb).  BP completed using cuff size: regular   Shanti Alcantara CMA    Health Maintenance Due   Topic Date Due     ZOSTER IMMUNIZATION (1 of 2) 08/28/2006     INFLUENZA VACCINE (1) 09/01/2019     MAMMO SCREENING  09/14/2019     Health Maintenance reviewed at today's visit patient asked to schedule/complete:   Breast Cancer:  Patient agrees to schedule  Immunizations:  Patient agrees to schedule    "

## 2019-09-13 NOTE — PATIENT INSTRUCTIONS
1. You have  a bladder infection Please increase your water intake  Do not drink cranberry juice as this does nothing different from water except cause weight gain.   We  Will notify you re the results of today's urine culture  You may need to return to be sure the infection is gone.  Oftentimes sexual intercourse causes the bacteria from the rectum to go into the bladder and cause infection.  To avoid this, please get up and go to the bathroom after intercourse and drink a large glass of water.  This allows the urine to wash out the infective bacteria and thus speed healing of an infection, and prevent one from occurring.  You may buy pyridium over the counter to help with the burning  It is a purple pill that turns the urine orange and helps with the burning     2.  Weight Loss Tips  1. Do not eat after 6 hrs before your expected bedtime  2. Have your heaviest meal for breakfast, a slightly lighter meal at lunch and a snack 6 hrs before bed  3. No sugar/calorie drinks except milk ie no fruit juice, pop, alcohol.  4. Drink milk 30min before meals to decrease your hunger. Also it is excellent as part of your last meal of the day snack  5. Drink lots of water  6. Increase fiber in diet: all bran cereal, salads, popcorn etc  7. Have only one small serving of fruit a day about 1/2 cup (as this is high in sugar)  8. EXERCISE is the bottom line. Without it, you will gain weight even on a low calorie diet. Best if done 2-3X a day as can    Being overweight contributes to high blood pressure and high cholesterol, both of which cause heart attacks, strokes and kidney failure, prediabetes and diabetes, arthritis, and liver disease     3. . Schedule your mammo at [facility]  At [address] at [phone]    In future as it is a better facility     4. Shingrex is a 2 shot series that prevents shingles 97% of the time, as opposed to the old shingles shot that only prevented it at 40-50%  It costs less for  medicare at a pharmacy  You should get it starting at 50 yrs old get the 2nd shot 5-6 mo after the first one

## 2019-09-15 LAB
BACTERIA SPEC CULT: ABNORMAL
BACTERIA SPEC CULT: ABNORMAL
SPECIMEN SOURCE: ABNORMAL

## 2019-09-23 ENCOUNTER — TELEPHONE (OUTPATIENT)
Dept: FAMILY MEDICINE | Facility: CLINIC | Age: 64
End: 2019-09-23

## 2019-09-23 NOTE — TELEPHONE ENCOUNTER
Patient was called with a message for PCP. Her pain has moved to the right temple, right eye, and to the top of head. Some pain in sinuses. Before it was just in the jaw. She does not have any pain unless the washes her hair, bushing teeth, washing face, or talking. She is going to [place] for a week and wondering if she needs to know anything or do different with this pain?     Advised to to contact airlines about medication guidelines. Advised to be careful of heavy luggage or uncomfortable positions that can trigger pain.

## 2019-09-23 NOTE — TELEPHONE ENCOUNTER
Reason for Call:  Other call back    Detailed comments: The patient called and stated that there are changes in the nerve pain in her face. She stated that she believes it has moved to the top of her head and her sinuses. She stated that it has made it hard for her to even wash her hair. She is going on a trip to [place] soon and she is concerned about travelling with this. She would like a call back to discuss this.     Phone Number Patient can be reached at: Cell number on file:    Telephone Information:   Mobile [phone]       Best Time: Any    Can we leave a detailed message on this number? YES    Call taken on 9/23/2019 at 9:33 AM by Misti Perez

## 2019-09-24 NOTE — TELEPHONE ENCOUNTER
Called patient and relayed provider's message. She denies further questions or concerns at this time. If she continues to have issues, she will call the clinic back.

## 2019-09-24 NOTE — TELEPHONE ENCOUNTER
Trigeminal neuralgia can affect the whole side of the face.  I would continue her medications prn without changes.

## 2019-10-01 ENCOUNTER — HEALTH MAINTENANCE LETTER (OUTPATIENT)
Age: 64
End: 2019-10-01

## 2019-11-09 ENCOUNTER — ANCILLARY PROCEDURE (OUTPATIENT)
Dept: MAMMOGRAPHY | Facility: CLINIC | Age: 64
End: 2019-11-09
Payer: COMMERCIAL

## 2019-11-09 DIAGNOSIS — Z12.31 VISIT FOR SCREENING MAMMOGRAM: ICD-10-CM

## 2019-11-09 PROCEDURE — 77067 SCR MAMMO BI INCL CAD: CPT | Mod: TC

## 2019-11-09 PROCEDURE — 77063 BREAST TOMOSYNTHESIS BI: CPT | Mod: TC

## 2019-12-10 ENCOUNTER — OFFICE VISIT (OUTPATIENT)
Dept: FAMILY MEDICINE | Facility: CLINIC | Age: 64
End: 2019-12-10
Payer: COMMERCIAL

## 2019-12-10 VITALS
SYSTOLIC BLOOD PRESSURE: 132 MMHG | RESPIRATION RATE: 16 BRPM | HEIGHT: 62 IN | BODY MASS INDEX: 28.71 KG/M2 | WEIGHT: 156 LBS | DIASTOLIC BLOOD PRESSURE: 80 MMHG | HEART RATE: 80 BPM | OXYGEN SATURATION: 98 % | TEMPERATURE: 98.3 F

## 2019-12-10 DIAGNOSIS — G50.0 TRIGEMINAL NEURALGIA: ICD-10-CM

## 2019-12-10 DIAGNOSIS — M79.674 PAIN OF TOE OF RIGHT FOOT: ICD-10-CM

## 2019-12-10 DIAGNOSIS — M79.645 PAIN OF LEFT THUMB: ICD-10-CM

## 2019-12-10 DIAGNOSIS — B07.0 PLANTAR WARTS: Primary | ICD-10-CM

## 2019-12-10 DIAGNOSIS — R35.0 URINARY FREQUENCY: ICD-10-CM

## 2019-12-10 DIAGNOSIS — K59.00 CONSTIPATION, UNSPECIFIED CONSTIPATION TYPE: ICD-10-CM

## 2019-12-10 PROCEDURE — 99214 OFFICE O/P EST MOD 30 MIN: CPT | Mod: 25 | Performed by: PHYSICIAN ASSISTANT

## 2019-12-10 PROCEDURE — 17110 DESTRUCTION B9 LES UP TO 14: CPT | Performed by: PHYSICIAN ASSISTANT

## 2019-12-10 RX ORDER — AMOXICILLIN 250 MG
1 CAPSULE ORAL 2 TIMES DAILY PRN
Qty: 60 TABLET | Refills: 1 | Status: SHIPPED | OUTPATIENT
Start: 2019-12-10 | End: 2021-02-10

## 2019-12-10 RX ORDER — METHYLPREDNISOLONE 4 MG
TABLET, DOSE PACK ORAL
Qty: 21 TABLET | Refills: 0 | Status: SHIPPED | OUTPATIENT
Start: 2019-12-10 | End: 2020-04-21

## 2019-12-10 ASSESSMENT — MIFFLIN-ST. JEOR: SCORE: 1211.89

## 2019-12-10 NOTE — PROGRESS NOTES
Subjective     Nu Taylor is a 64 year old female who presents to clinic today for the following health issues:    HPI   WART(S)      Onset: more than a year, does cause pain when walking.      Description (location/number): 2 on rt foot     Accompanying signs and symptoms: Painful: YES    History: prior warts: YES    Therapies tried and outcome: OTC wart remover/freezer -- has helped but not gotten rid of them    Medication Followup     Taking Medication as prescribed: yes    Side Effects:  constipation    Medication Helping Symptoms:  yes     Musculoskeletal problem/pain      Duration: x2-3 months    Description  Location: left thumb     Intensity:  mild    Accompanying signs and symptoms: tingling    History  Previous similar problem: no   Previous evaluation:  none    Precipitating or alleviating factors:  Trauma or overuse: no   Aggravating factors include: lifting -- pt unable to lift anything heavy     Therapies tried and outcome: rest/inactivity, heat, ice and massage       Recent Labs   Lab Test 08/29/19  1116 07/31/18  0823 05/30/17  1524 02/10/16  1028 10/09/15  1707  10/14/13  0740   * 105* 122*  --   --    < > 110   HDL 73 69 75  --   --    < > 62   TRIG 111 85 76  --   --    < > 52   ALT 32  --  26  --   --   --  40   CR 0.62 0.66 0.61  --   --   --  0.60   GFRESTIMATED >90 >90 >90  Non African American GFR Calc    --   --   --  >90   GFRESTBLACK >90 >90 >90  African American GFR Calc    --   --   --  >90   POTASSIUM 3.6 3.9 4.1  --   --   --  3.5   TSH  --   --   --  0.96 1.30   < >  --     < > = values in this interval not displayed.      BP Readings from Last 3 Encounters:   12/10/19 132/80   09/13/19 120/70   08/29/19 130/76    Wt Readings from Last 3 Encounters:   12/10/19 70.8 kg (156 lb)   09/13/19 71.2 kg (157 lb)   08/29/19 70.8 kg (156 lb)         Reviewed and updated as needed this visit by Provider  Tobacco  Allergies  Meds  Problems  Med Hx  Surg Hx  Fam Hx  Soc Hx       "    Additional complaints: Urinary frequency, right treat toe pain    HPI additional notes: Nu presents today with   Chief Complaint   Patient presents with     Derm Problem     warts on rt foot     Recheck Medication     side effects      Musculoskeletal Problem     left thumb joint pain x2-3 months   Right foot, two warts with surrounding callus.  Right toe MTP joint pain, will try medrol dose pack.  If not improving in two days, may need to see podiatry.  Urinary frequency, history of sling placement, will refer to PT.  CTS of left wrist.  Exercises discussed.         Review of Systems   C: Negative for fever, chills, recent change in weight  Skin: POSITIVE for verruca. Negative for warmth, swelling or redness  ENT: Negative for ear, mouth and throat problems  Resp: Negative for significant cough or SOB  CV: Negative for chest pain or peripheral edema  GI: Negative for nausea, abdominal pain, heartburn, or change in bowel habits. Positive for constipation due to medications.  : POSITIVE for frequency, urgency. Negative for dysuria  MS:  Positive for toe, finger and foot pain  P: Negative for changes in mood or affect  ROS all other systems negative.        Objective    /80 (Patient Position: Sitting, Cuff Size: Adult Regular)   Pulse 80   Temp 98.3  F (36.8  C) (Tympanic)   Resp 16   Ht 1.568 m (5' 1.75\")   Wt 70.8 kg (156 lb)   LMP 10/22/2008 (Exact Date)   SpO2 98%   BMI 28.76 kg/m    Body mass index is 28.76 kg/m .  Physical Exam   GENERAL: healthy, alert, in no acute distress  MS: Tenderness to right MTP joint without warmth or erythema, tenderness to left thumb with positive phalen's on left  Skin: 2 verruca on base of right foot, 8 and 4 mm in size, surrounded by 2.5 cm callous.  PSYCH: Alert and oriented times 3;  Able to articulate logical thoughts. Affect is normal.    Diagnostic test results: none         Assessment & Plan         ICD-10-CM    1. Plantar warts B07.0 DESTRUCT BENIGN " LESION, UP TO 14   2. Trigeminal neuralgia G50.0    3. Pain of left thumb M79.645    4. Constipation, unspecified constipation type K59.00 senna-docusate (SENOKOT-S/PERICOLACE) 8.6-50 MG tablet   5. Pain of toe of right foot M79.674 methylPREDNISolone (MEDROL DOSEPAK) 4 MG tablet therapy pack   6. Urinary frequency R35.0 PHYSICAL THERAPY REFERRAL     Lesions were pared down using a #11 sterile blade.  Liquid nitrogen was applied for 10 seconds x3  to the skin lesions. The expected blistering/scabbing reaction was explained. Patient is not to pick at the areas. Patient reminded to expect hypopigmented scars from the procedure. Return in 2-3 weeks for repeat treatment if lesions fail to fully resolve.  At home treatment discussed in patient instructions.      Right foot, two warts with surrounding callus.  Right toe MTP joint pain, will try medrol dose pack.  If not improving in two days, may need to see podiatry.  Urinary frequency, history of sling placement, will refer to PT.  CTS of left wrist.  Exercises discussed.    Please see patient instructions for treatment details.    Return in about 2 weeks (around 12/24/2019) for Specialist Appt as referred.    Samina Bone PA-C  [facility]

## 2019-12-10 NOTE — PATIENT INSTRUCTIONS
Patient Education     Understanding Carpal Tunnel Syndrome    The carpal tunnel is a narrow space inside the wrist. It is ringed by bone and a band of tough tissue called the transverse carpal ligament. A major nerve called the median nerve runs from the forearm into the hand through the carpal tunnel. Tendons also run through the carpal tunnel.  With carpal tunnel syndrome, the tendons or nearby tissues within the carpal tunnel may swell or thicken. Or the transverse carpal ligament may harden and shorten. This narrows the space in the carpal tunnel and puts pressure on the median nerve. This pressure leads to tingling and numbness of the hand and wrist. In time, the condition can make even simple tasks hard to do.  What causes carpal tunnel syndrome?  Doctors aren t entirely clear why the condition occurs. Certain things may make a person more likely to have it. These include:    Being female    Being pregnant    Being overweight    Having diabetes or rheumatoid arthritis  Symptoms of carpal tunnel syndrome  Symptoms often come and go. At first, symptoms may occur mainly at night. Later, they may be noticed during the day as well. They may get worse with activities such as driving, reading, typing, or holding a phone. Symptoms can include:    Tingling and numbness in the hand or wrist    Sharp pain that shoots up the arm or down to the fingers    Hand stiffness or cramping, especially in the morning    Trouble making a fist    Hand weakness and clumsiness  Treatment for carpal tunnel syndrome  Certain treatments help reduce the pressure on the median nerve and relieve symptoms. Choices for treatment may include one or more of the following:    Wrist splint. This involves wearing a special brace on the wrist and hand. The splint holds the wrist straight, in a neutral position. This helps keep the carpal tunnel as open as possible.    Cortisone shots. Cortisone is a medicine that helps reduce swelling. It is  injected directly into the wrist. It helps shrink tissues inside the carpal tunnel. This relieves symptoms for a time.    Pain medicines. You may take over-the-counter or prescription medicines to help reduce swelling and relieve symptoms.    Surgery. If the condition doesn t respond to other treatments and doesn t go away on its own, you may need surgery. During surgery, the surgeon cuts the transverse carpal ligament to relieve pressure on the median nerve.     When to call your healthcare provider  Call your healthcare provider right away if you have any of these:    Fever of 100.4 F (38 C) or higher, or as directed    Symptoms that don t get better, or get worse    New symptoms   Date Last Reviewed: 3/10/2016    [id number] [employer]. [address] [place] [address], [place] [id number]. All rights reserved. This information is not intended as a substitute for professional medical care. Always follow your healthcare professional's instructions.           Patient Education     Carpal Tunnel Syndrome Prevention Tips  Carpal tunnel syndrome is a painful condition in the hand and wrist. It occurs when there is too much pressure on the median nerve at the wrist. The median nerve runs from your forearm to the palm of your hand. It may get squeezed or pressed when it passes through the carpal tunnel from your wrist to your hand. You may then feel numbness, tingling, pain, or weakness in your hand and up your forearm.  Doing the same hand activities over and over can put you at higher risk for carpal tunnel syndrome. But you can reduce your risk. Learn how to change the way you use your hands. Below are tips for at home and on the job. Also follow the hand and wrist safety policies at your workplace.      Keep your wrist in a straight (neutral) position when exercising.      Keep your wrist in neutral  Keep a straight (neutral) wrist position as often as you can. Don t use your wrist in a bent (flexed) position for  long periods of time. This includes extended or twisted positions.  When you sleep, don't have your wrist flexed (don't sleep all curled up on your side). And don't put extra pressure on your wrist for long periods of time (don't sleep on your stomach with your hands under you).  Watch your   Don t use only your thumb and index finger to grasp or lift something. This can put stress on your wrist. When you can, use your whole hand and all its fingers to grasp an object.  Minimize repetition  Don t move your arms or hands the same way for long periods of time. And don't hold an object in the same way for long periods of time. Even simple, light tasks can cause injury this way. Instead, switch tasks or switch hands.  Rest your hands  Give your hands a break from time to time with a rest. Even a few minutes once an hour can help.  Reduce speed and force  Slow down when you do a forceful, repetitive motion. This gives your wrist time to recover from the effort. Use power tools to help reduce the force.  Strengthen the muscles  Weak muscles may lead to a poor wrist or arm position. Exercises will make your hand and arm muscles stronger. This can help you keep a better position.  Date Last Reviewed: 1/1/2018 2000-2018 [employer]. [address] [place] [address], [place] [address]. All rights reserved. This information is not intended as a substitute for professional medical care. Always follow your healthcare professional's instructions.

## 2019-12-11 NOTE — TELEPHONE ENCOUNTER
Call back from patient.     Update on what medications she is currently taking. Taking Baclofen twice a day and CarBAMazepine 2 tabs BID.     Wondering how she should take medications going forward now that she has recovered from exacerbation of trigeminal neuralgia.       Lab Facility: [id number]

## 2019-12-24 ENCOUNTER — THERAPY VISIT (OUTPATIENT)
Dept: PHYSICAL THERAPY | Facility: CLINIC | Age: 64
End: 2019-12-24
Attending: PHYSICIAN ASSISTANT
Payer: COMMERCIAL

## 2019-12-24 DIAGNOSIS — R32 URINARY INCONTINENCE: ICD-10-CM

## 2019-12-24 DIAGNOSIS — M62.81 MUSCLE WEAKNESS (GENERALIZED): ICD-10-CM

## 2019-12-24 DIAGNOSIS — R35.0 URINARY FREQUENCY: ICD-10-CM

## 2019-12-24 PROCEDURE — 97110 THERAPEUTIC EXERCISES: CPT | Mod: GP | Performed by: PHYSICAL THERAPIST

## 2019-12-24 PROCEDURE — 97535 SELF CARE MNGMENT TRAINING: CPT | Mod: GP | Performed by: PHYSICAL THERAPIST

## 2019-12-24 PROCEDURE — 97161 PT EVAL LOW COMPLEX 20 MIN: CPT | Mod: GP | Performed by: PHYSICAL THERAPIST

## 2019-12-24 NOTE — PROGRESS NOTES
[facility] Initial Evaluation  Subjective:  Patient has chief complaint of urinary frequency which started over 5 years ago of insidious onset. She voids about every 30 minutes during the day and gets up 1x/night to void. She has occasional large leaks, usually as urge incontinence. She wears pads all the time, with small leaks 2-5x/day.  Bladder symptoms started in 2000 (5 years after having babies). History of frequent urinary tract infections x years (UTIs 1-2x/year). No low back problems. She complains of difficulty initiating bowel movements: she has BM daily, but has to make sure she eats fruit, drinks coffee and walks on treadmill to keep things moving regularly. History of 4 vaginal deliveries, bladder sling surgery >5 years ago which only helped for one year.  Medical history includes trigeminal neurology, HTN      Type of problem:  Pelvic dysfunction and incontinence (urinary frequency)   Condition occurred with:  After pregnancy. This is a chronic condition       Symptoms are exacerbated by coughing (urge incontinence , urinary frequency)                       Objective:  System                                 Pelvic Dysfunction Evaluation:      Diagnostic Tests:    Pelvic Exam:  Normal                          Pelvic Clock Exam:    Ischiocavernosis pain:  -  Bulbocavernosis pain:  -  Transverse Perineal:  -  Levator ANI:  -  Perineal Body:  -    Reflex Testing:  normal    External Assessment:    Skin Condition:  Normal    Bearing Down/Coughing:  Rectocele and cystocele  Tissue Symmetry:  Normal  Introitus:  Normal  Muscle Contraction/Perineal Mobility:  Slight lift, no urogential triangle descent  Internal Assessment:  Internal assessment pelvic: mild to moderate tightness and tenderness levator ani mm bilaterally.  Sensory Exam:  Normal  Contraction/Grade:  Weak squeeze, 2 second hold (2)          SEMG Biofeedback:  NA                  Additional History:  Delivery History:  Vaginal  delivery  Number of Pregnancies: 4  Number of Live Births: 4  Caffeine Consumption:  1-2 cups in am                     General     ROS    Assessment/Plan:    Patient is a 64 year old female with pelvic complaints.    Patient has the following significant findings with corresponding treatment plan.                Diagnosis 1:  Pelvic floor dyfunction  Decreased ROM/flexibility - manual therapy and therapeutic exercise  Decreased strength - therapeutic exercise and therapeutic activities  Impaired muscle performance - biofeedback and neuro re-education  Decreased function - therapeutic activities and home program    Therapy Evaluation Codes:   1) History comprised of:   Personal factors that impact the plan of care:      None.    Comorbidity factors that impact the plan of care are:      None.     Medications impacting care: None.  2) Examination of Body Systems comprised of:   Body structures and functions that impact the plan of care:      Pelvis.   Activity limitations that impact the plan of care are:      Frequency, Urge incontinence and Urinary incontinence.  3) Clinical presentation characteristics are:   Stable/Uncomplicated.  4) Decision-Making    Low complexity using standardized patient assessment instrument and/or measureable assessment of functional outcome.  Cumulative Therapy Evaluation is: Low complexity.    Previous and current functional limitations:  (See Goal Flow Sheet for this information)    Short term and Long term goals: (See Goal Flow Sheet for this information)     Communication ability:  Patient appears to be able to clearly communicate and understand verbal and written communication and follow directions correctly.  Treatment Explanation - The following has been discussed with the patient:   RX ordered/plan of care  Anticipated outcomes  Possible risks and side effects  This patient would benefit from PT intervention to resume normal activities.   Rehab potential is good.    Frequency:  1  X week, once daily  Duration:  for 8 weeks  Discharge Plan:  Achieve all LTG.  Independent in home treatment program.  Reach maximal therapeutic benefit.    Please refer to the daily flowsheet for treatment today, total treatment time and time spent performing 1:1 timed codes.

## 2019-12-26 PROBLEM — R32 URINARY INCONTINENCE: Status: ACTIVE | Noted: 2019-12-26

## 2019-12-26 PROBLEM — M62.81 MUSCLE WEAKNESS (GENERALIZED): Status: ACTIVE | Noted: 2019-12-26

## 2019-12-26 NOTE — PROGRESS NOTES
[facility] Initial Evaluation  Subjective:  The history is provided by the patient. No  was used.   Nu Taylor being seen for bladder issues.   Problem began 12/26/2014. Problem occurred: old age  and reported as 0/10 on pain scale. General health as reported by patient is good. Pertinent medical history includes:  High blood pressure.    Surgeries include:  Other. Other surgery history details: tonsils, reversal.  Current medications:  High blood pressure medication.   Primary job tasks include:  Computer work and driving.           Patient is  Instructor .                           Objective:  System    Physical Exam    General     ROS    Assessment/Plan:

## 2020-01-16 ENCOUNTER — TELEPHONE (OUTPATIENT)
Dept: FAMILY MEDICINE | Facility: CLINIC | Age: 65
End: 2020-01-16

## 2020-01-16 NOTE — LETTER
January 17, 2020      Nu Taylor  [address] [facility] [address]      [facility] fax [phone].      To Whom It May Concern,           Ms. Taylor is under my medical care.  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  If you have any questions or concerns, please let me know.          Sincerely,        Samina Bone PA-C

## 2020-01-16 NOTE — TELEPHONE ENCOUNTER
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  Fax letter to   [facility] fax [phone].    Phone Number Patient can be reached at: Home number on file [phone] (home)    Best Time: any    Can we leave a detailed message on this number? YES    Call taken on 1/16/2020 at 4:49 PM by CHOLO MORGAN

## 2020-02-20 PROBLEM — M62.81 MUSCLE WEAKNESS (GENERALIZED): Status: RESOLVED | Noted: 2019-12-26 | Resolved: 2020-02-20

## 2020-02-20 PROBLEM — R32 URINARY INCONTINENCE: Status: RESOLVED | Noted: 2019-12-26 | Resolved: 2020-02-20

## 2020-02-24 ENCOUNTER — TELEPHONE (OUTPATIENT)
Dept: FAMILY MEDICINE | Facility: CLINIC | Age: 65
End: 2020-02-24

## 2020-02-24 NOTE — TELEPHONE ENCOUNTER
Patient called the clinic. She has switched her health insurance from [employer] to  [employer], her  is a . She would like to continue seeing [facility], but they don't have her in their system. Pt said PCP would need to call for contract credentialing and answer several questions [phone]. Pt would like to be notified when this is done, so she can call them back.

## 2020-02-27 NOTE — TELEPHONE ENCOUNTER
I can't fill this out without her SSN or the DOD benefits number.  Sent Myc message but may want to call her.

## 2020-02-27 NOTE — TELEPHONE ENCOUNTER
Pt info:     Please see routing comment for pt SSN.    DOD: [phone]    Please let triage know when completed so we let pt know. Thanks!

## 2020-02-27 NOTE — TELEPHONE ENCOUNTER
ID for this session: [id number]    RN spoke with general representative who transferred RN to credentialing line.    Credentialing states that PCP is not listed as a primary care manager.     States that the clinic is part of a delegated group.    -We need to reach out to our credentialing department.  -Ask them to send an email requesting to have Kristal show as a primary care PA  -Credentialing contact with [facility]: Belinda Mahoney ([email or web address])  -Contact at [facility]: [email or web address])

## 2020-02-27 NOTE — TELEPHONE ENCOUNTER
You can try calling the number and seeing.  It just asks for my NPI: [id number] and then her information and says those numbers are not valid.

## 2020-02-27 NOTE — TELEPHONE ENCOUNTER
RN will reach out to [facility] contact.  States no further action needs to be taken at provider's level at this time.

## 2020-03-02 NOTE — TELEPHONE ENCOUNTER
Update if pt calls back:    Patient called the clinic. She has switched her health insurance from [employer] to  [employer], her  is a . She would like to continue seeing [facility], but they don't have her in their system. Pt said PCP would need to call for contract credentialing and answer several questions [phone]. Pt would like to be notified when this is done, so she can call them back.   ID for this session: [id number]  RN spoke with general representative who transferred RN to credentialing line.  Credentialing states that PCP (Samina Bone) is not listed as a primary care manager.     This was forwarded to the [facility] team in charge of this. Today, 03/02/20:    Good morning,  My team will follow-up with Kurt to ensure Samina Bone PA-C is enrolled at the [facility].  Thank-you!  Patti Tejeda MBA, CPCS       If pt calls back:  Currently being worked on.

## 2020-03-05 NOTE — TELEPHONE ENCOUNTER
RN attempted call back at this time.  Will close out encounter as no further action needs to be taken on RN or pt end.

## 2020-03-05 NOTE — TELEPHONE ENCOUNTER
Pt calling back.  States at this time she has not heard anything from .  Will keep clinic updated.

## 2020-03-10 DIAGNOSIS — G50.0 TRIGEMINAL NEURALGIA: ICD-10-CM

## 2020-03-11 NOTE — TELEPHONE ENCOUNTER
baclofen (LIORESAL) 20 MG tablet   Last Written Prescription Date:  8/29/2019  Last Fill Quantity: 90 tablet,  # refills: 3   Last office visit: 12/10/2019 with prescribing provider:  Smitha Bermudez Office Visit:         Routing refill request to provider for review/approval because:  Drug not on the [facility], [facility] or [facility] refill protocol or controlled substance

## 2020-03-11 NOTE — TELEPHONE ENCOUNTER
When refilling this, can we put a note to the [facility] pharmacy that Kristal Bone is the same person as Kristal Mir? The pharmacy for some reason still has Kristal's maiden name listed and they have troubles refilling the meds for pt.

## 2020-03-11 NOTE — TELEPHONE ENCOUNTER
Pt called back and states that per her insurance she can still be seen here but will have to pay 25% of the bill. She will not have a copay or deductible.

## 2020-03-12 RX ORDER — BACLOFEN 20 MG/1
TABLET ORAL
Qty: 90 TABLET | Refills: 3 | Status: SHIPPED | OUTPATIENT
Start: 2020-03-12 | End: 2020-04-26

## 2020-04-21 ENCOUNTER — NURSE TRIAGE (OUTPATIENT)
Dept: FAMILY MEDICINE | Facility: CLINIC | Age: 65
End: 2020-04-21

## 2020-04-21 DIAGNOSIS — M79.674 PAIN OF TOE OF RIGHT FOOT: ICD-10-CM

## 2020-04-21 DIAGNOSIS — G50.0 TRIGEMINAL NEURALGIA: Primary | ICD-10-CM

## 2020-04-21 RX ORDER — METHYLPREDNISOLONE 4 MG
TABLET, DOSE PACK ORAL
Qty: 21 TABLET | Refills: 0 | Status: SHIPPED | OUTPATIENT
Start: 2020-04-21 | End: 2020-04-27

## 2020-04-21 NOTE — TELEPHONE ENCOUNTER
"Trigeminal neuralgia symptoms. She has had this for the past year. Shocking her right side.     Patient's  is calling to report patient is having an episode of trigeminal neuralgia. He states she has had it on and off for the last few months, but has been mostly managed with her medications. This time is more intense and they rate the pain as 10/10.      notes that contributing factors are stress and fatigue. He notices her episodes happen more at night when she is tired and when she is having more stress.     They state they would like to avoid ED. They request RN send this to PCP to advise. Also routing to covering team to advise if there is any option for patient before scheduled telephone visit tomorrow.    They will wait to see if her medications kick-in. If they do not they will either call triage back or go to ED.      Answer Assessment - Initial Assessment Questions  1. ONSET: \"When did the mouth start hurting?\" (e.g., hours or days ago)     Started today - had an episode 10-15 minutes         2. SEVERITY: \"How bad is the pain?\" (Scale 1-10; mild, moderate or severe)    - MILD (1-3):  doesn't interfere with eating or normal activities    - MODERATE (4-7): interferes with eating some solids and normal activities    - SEVERE (8-10):  excruciating pain, interferes with most normal activities    - SEVERE DYSPHAGIA: can't swallow liquids, drooling    Rating as 10/10         3. SORES: \"Are there any sores or ulcers in the mouth?\" If so, ask: \"What part of the mouth are the sores in?\"      n/a  4. FEVER: \"Do you have a fever?\" If so, ask: \"What is your temperature, how was it measured, and when did it start?\"      n/a  5. CAUSE: \"What do you think is causing the mouth pain?\"    Trigeminal Neuralgia         6. OTHER SYMPTOMS: \"Do you have any other symptoms?\" (e.g., difficulty breathing)        Face spasms, shocks her face    Protocols used: MOUTH PAIN-A-AH      "

## 2020-04-22 ENCOUNTER — TELEPHONE (OUTPATIENT)
Dept: FAMILY MEDICINE | Facility: CLINIC | Age: 65
End: 2020-04-22

## 2020-04-22 RX ORDER — HYDROCODONE BITARTRATE AND ACETAMINOPHEN 5; 325 MG/1; MG/1
1-2 TABLET ORAL EVERY 6 HOURS PRN
Qty: 20 TABLET | Refills: 0 | Status: SHIPPED | OUTPATIENT
Start: 2020-04-22 | End: 2020-04-27

## 2020-04-22 NOTE — TELEPHONE ENCOUNTER
Reason for Call:  Other prescription    Detailed comments  Prescription for pain medication was sent to [facility] pharmacy by Kristal Bone   Another prescription for a steroid was sent to [facility] by Dr Dover   The patient wants to know if she should take both of them   Please call her to discuss      Phone Number Patient can be reached at: Home number on file [phone] (home)    Best Time: anytime    Can we leave a detailed message on this number? YES    Call taken on 4/22/2020 at 10:48 AM by COTY OLMSTEAD

## 2020-04-22 NOTE — TELEPHONE ENCOUNTER
Called and LVM for pt letting her know that the Rx was sent and that she does not need to have a telephone visit if she does not need to address anything else.

## 2020-04-22 NOTE — TELEPHONE ENCOUNTER
I'll refill her norco that she had when it was the worst, she doesn't need a visit, I know it is very hard for her to talk when this happens.

## 2020-04-22 NOTE — TELEPHONE ENCOUNTER
Pt wondering if she is suppose to take the Norco in addition to the baclofen, or is this replacing it?

## 2020-04-23 NOTE — TELEPHONE ENCOUNTER
Pt's  reports Norco is not working. They didn't  Rx for Medrol since pt was not aware PCP wanted her to take it since this was prescribed by a different provider. Writer advised they  Rx. If persistent pain, pt would need to seek care at ER .      plans to  Rx for Medrol. He asked if pt is to take Carbatrol with Norco and Medrol. Please advice.

## 2020-04-23 NOTE — TELEPHONE ENCOUNTER
RN called back to pt.  Relayed provider message.  Pt states understanding.  No further action needed at this time.

## 2020-04-23 NOTE — TELEPHONE ENCOUNTER
Pt's  called back to let Kristal know that the medication that the pt was given isn't working.  The pt is in a lot of pain.  Please call pt's  Thomas back at [phone] anytime.

## 2020-04-26 ENCOUNTER — HOSPITAL ENCOUNTER (EMERGENCY)
Facility: CLINIC | Age: 65
Discharge: HOME | End: 2020-04-26
Attending: PHYSICIAN ASSISTANT | Admitting: PHYSICIAN ASSISTANT
Payer: OTHER GOVERNMENT

## 2020-04-26 VITALS — RESPIRATION RATE: 16 BRPM | HEART RATE: 100 BPM | OXYGEN SATURATION: 99 %

## 2020-04-26 DIAGNOSIS — R51.9 FACIAL PAIN: ICD-10-CM

## 2020-04-26 DIAGNOSIS — Z86.69 HISTORY OF TRIGEMINAL NEURALGIA: ICD-10-CM

## 2020-04-26 PROCEDURE — 96376 TX/PRO/DX INJ SAME DRUG ADON: CPT

## 2020-04-26 PROCEDURE — 99285 EMERGENCY DEPT VISIT HI MDM: CPT | Mod: 25

## 2020-04-26 PROCEDURE — 25000128 H RX IP 250 OP 636: Performed by: PHYSICIAN ASSISTANT

## 2020-04-26 PROCEDURE — 96375 TX/PRO/DX INJ NEW DRUG ADDON: CPT

## 2020-04-26 PROCEDURE — 96374 THER/PROPH/DIAG INJ IV PUSH: CPT

## 2020-04-26 RX ORDER — HYDROMORPHONE HYDROCHLORIDE 1 MG/ML
0.5 INJECTION, SOLUTION INTRAMUSCULAR; INTRAVENOUS; SUBCUTANEOUS
Status: COMPLETED | OUTPATIENT
Start: 2020-04-26 | End: 2020-04-26

## 2020-04-26 RX ORDER — DIPHENHYDRAMINE HYDROCHLORIDE 50 MG/ML
10 INJECTION INTRAMUSCULAR; INTRAVENOUS ONCE
Status: COMPLETED | OUTPATIENT
Start: 2020-04-26 | End: 2020-04-26

## 2020-04-26 RX ORDER — METOCLOPRAMIDE HYDROCHLORIDE 5 MG/ML
10 INJECTION INTRAMUSCULAR; INTRAVENOUS ONCE
Status: COMPLETED | OUTPATIENT
Start: 2020-04-26 | End: 2020-04-26

## 2020-04-26 RX ORDER — DEXAMETHASONE SODIUM PHOSPHATE 10 MG/ML
10 INJECTION, SOLUTION INTRAMUSCULAR; INTRAVENOUS ONCE
Status: COMPLETED | OUTPATIENT
Start: 2020-04-26 | End: 2020-04-26

## 2020-04-26 RX ORDER — OXYCODONE HYDROCHLORIDE 5 MG/1
5 TABLET ORAL EVERY 6 HOURS PRN
Qty: 12 TABLET | Refills: 0 | Status: SHIPPED | OUTPATIENT
Start: 2020-04-26 | End: 2020-04-27

## 2020-04-26 RX ORDER — KETOROLAC TROMETHAMINE 15 MG/ML
15 INJECTION, SOLUTION INTRAMUSCULAR; INTRAVENOUS ONCE
Status: COMPLETED | OUTPATIENT
Start: 2020-04-26 | End: 2020-04-26

## 2020-04-26 RX ADMIN — HYDROMORPHONE HYDROCHLORIDE 0.5 MG: 1 INJECTION, SOLUTION INTRAMUSCULAR; INTRAVENOUS; SUBCUTANEOUS at 17:54

## 2020-04-26 RX ADMIN — DEXAMETHASONE SODIUM PHOSPHATE 10 MG: 10 INJECTION, SOLUTION INTRAMUSCULAR; INTRAVENOUS at 17:42

## 2020-04-26 RX ADMIN — KETOROLAC TROMETHAMINE 15 MG: 15 INJECTION, SOLUTION INTRAMUSCULAR; INTRAVENOUS at 17:40

## 2020-04-26 RX ADMIN — HYDROMORPHONE HYDROCHLORIDE 0.5 MG: 1 INJECTION, SOLUTION INTRAMUSCULAR; INTRAVENOUS; SUBCUTANEOUS at 18:27

## 2020-04-26 RX ADMIN — DIPHENHYDRAMINE HYDROCHLORIDE 10 MG: 50 INJECTION, SOLUTION INTRAMUSCULAR; INTRAVENOUS at 17:46

## 2020-04-26 RX ADMIN — METOCLOPRAMIDE HYDROCHLORIDE 10 MG: 5 INJECTION INTRAMUSCULAR; INTRAVENOUS at 17:48

## 2020-04-26 ASSESSMENT — ENCOUNTER SYMPTOMS
SPEECH DIFFICULTY: 0
HEADACHES: 1

## 2020-04-26 NOTE — DISCHARGE INSTRUCTIONS
Call your neurologist and schedule follow-up.  Continue normal medications.  Try the oxycodone instead of the Norco.  You can take a full dose of Tylenol and ibuprofen with this.  You can take up to 1000 mg or 1 g of Tylenol.  You can take 600 mg of ibuprofen at one time.  You can take these together every 6 hours if needed.  Always take ibuprofen with food.  Never take more than 4 g (4000 mg) of Tylenol in 1 day.  Do not take this amount for more than 1 week at a time. Return for changing worsening symptoms, speech difficulty, one-sided weakness, new concerns.

## 2020-04-26 NOTE — ED AVS SNAPSHOT
[facility]  [address]  [facility] [address]  Phone:  [phone]  Fax:  [phone]                                    Nu Taylor   MRN: [id number]    Department:  [facility]   Date of Visit:  4/26/2020           After Visit Summary Signature Page    I have received my discharge instructions, and my questions have been answered. I have discussed any challenges I see with this plan with the nurse or doctor.    ..........................................................................................................................................  Patient/Patient Representative Signature      ..........................................................................................................................................  Patient Representative Print Name and Relationship to Patient    ..................................................               ................................................  Date                                   Time    ..........................................................................................................................................  Reviewed by Signature/Title    ...................................................              ..............................................  Date                                               Time          22EPIC Rev 08/18

## 2020-04-26 NOTE — ED TRIAGE NOTES
Patient arrived at around 16:50 complaining of right sided facial pain. History of trigeminal neuralgia and currently prescribed norco, carbamazepine, methylprednisone for flair without relief of pain. Patient reports nausea due to increased pain and difficulty talking due to pain. Denies fever/chills. ABCs intact. Alert and oriented X4. Oriented to room and call light. Patient educated about hand hygiene practices.

## 2020-04-26 NOTE — ED PROVIDER NOTES
History     Chief Complaint:  Facial Pain      The history is provided by the patient.      Nu Taylor is a 64 year old female with a history of trigeminal neuralgia who presents with facial pain. She has been evaluated by neurosurgery in August of 2019, surgery and procedures were discussed but she decided to wait. Over the last six days she has had increasing pain, making it difficult to sleep. The patient reports a flare up of her trigeminal neuralgia worsened an hour ago.  She has taken methyl prednisone, norco, and  Carbamazepine without relief. Her pain is located on the left side of her jaw, shooting downwards, which is typical with her trigeminal neuralgia. The patient endorses headache and pain with speech as well. She denies visual changes or speech difficulty.     Allergies:  No Known Allergies     Medications:    Amlodapine-benazepril  Rice  Medrol  Senna  Simvastatin     Past Medical History:    Hypertension   Hyperlipidemia   Allergic rhinitis   Hallux rigidus   Degenerative disc disease     Past Surgical History:    Ligation fallopian tube   Laparoscopic suspension     Family History:    No past pertinent family history.    Social History:  Smoking Status: Never Smoker  Smokeless Tobacco: Never Used  Alcohol Use: Yes  Drug Use: No  PCP: Samina Bone  Marital Status:        Review of Systems   HENT:        Positive facial pain   Neurological: Positive for headaches. Negative for speech difficulty.   All other systems reviewed and are negative.    Physical Exam     Patient Vitals for the past 24 hrs:   Pulse Heart Rate Resp SpO2   04/26/20 1925 -- -- 16 99 %   04/26/20 1845 -- -- -- 95 %   04/26/20 1840 -- -- -- 95 %   04/26/20 1835 -- -- -- 94 %   04/26/20 1830 -- -- -- 94 %   04/26/20 1825 -- -- -- 97 %   04/26/20 1820 -- -- -- 98 %   04/26/20 1815 -- -- -- 97 %   04/26/20 1810 -- -- -- 97 %   04/26/20 1805 -- -- -- 96 %   04/26/20 1800 -- -- -- 95 %   04/26/20 1651 100  100 20 98 %     Physical Exam   General: Appears uncomfortable, resting on the bed.  Pain in right side of face with talking  Skin: Good turgor, no rash, no unusual bruising or prominent lesions.  HEENT: Head: Normocephalic, atraumatic, no visible or palpable masses, depressions, or scarring.  Eyes: Conjunctiva clear, sclera non-icteric, EOM intact, PERRL.   Throat/pharynx: Mucous membranes moist, no mucosal lesions. Mucosa non-inflamed, no tonsillar hypertrophy or exudate.   Neck: Supple, without lesions or adenopathy.  No neck stiffness.  Cardiac: Normal rate and regular rhythm, no murmur or gallop.   Lungs: Clear to auscultation and percussion   Abdomen: Bowel sounds normal, no tenderness, organomegaly, masses, or hernia. No guarding or rebound tenderness.   Musculoskeletal: Normal gait and station. Full strength in upper and lower extremities.   Neurologic: Alert and oriented x3. GCS 15. CN II-VII intact. Normal finger to nose, rapid hand movements, heel to shin. No pronator drift.   Psychiatric: Intact recent and remote memory, judgment and insight, normal mood and affect.     Emergency Department Course     Interventions:  1740 Toradol 15 mg IV  1742 Decadron 10 mg IV   1746 Benadryl 10 mg IV  1748 Reglan 10 mg IV  1754 Dilaudid 0.5 mg IV  1827 Dilaudid 0.5 mg IV     Emergency Department Course:  Nursing notes and vitals reviewed.    1721 I performed an exam of the patient as documented above.     1819 I returned to update the patient.     1850 I returned to update the patient about their plan for discharge.     Findings and plan explained to the Patient. Patient discharged home with instructions regarding supportive care, medications, and reasons to return. The importance of close follow-up was reviewed. The patient was prescribed oxycodone.     Impression & Plan      Medical Decision Making:  Nu Taylor is a 64 year old female who presents to the emergency department today for evaluation of right-sided  facial pain.  Details the patient's history can be noted in the HPI.  Differentials considered included trigeminal neuralgia, temporal arteritis, TMJ, CVA, migraine, cluster headache, amongst others.  On my exam, patient was neurologically intact but did have obvious discomfort throughout the trigeminal nerve distribution of the right sided face.  As this was classic distribution of the patient's trigeminal neuralgia, I did not feel that she warranted imaging studies at this time.  Pain medications were given as noted above.  This did improve the patient's discomfort.  Ultimately, I do feel that she does need a repeat consultation with neurology.  They had discussed doing microvascular decompression.  I advised her to continue this conversation with them, discuss Botox, other medication options.  She does feel well enough to be discharged at this time.  Return for changing worsening symptoms, speech difficulty, visual changes, weakness, new concerns.  We did discuss that long-term narcotics are not warranted for chronic pain.  However, given her sudden increase, we will discharge her with oxycodone.  She can take Tylenol and ibuprofen with this.  She will continue her other medications such as Tegretol for the time being.  All questions were answered prior to discharge.  She is agreement with the treatment plan as stated above.    Diagnosis:    ICD-10-CM    1. Facial pain  R51    2. History of trigeminal neuralgia  Z86.69        Disposition:   The patient is discharged to home.    Discharge Medications:  New Prescriptions    OXYCODONE (ROXICODONE) 5 MG TABLET    Take 1 tablet (5 mg) by mouth every 6 hours as needed for pain       Scribe Disclosure:  FERNANDO, Estelle Alarcon, am serving as a scribe at 5:02 PM on 4/26/2020 to document services personally performed by Adelia Carbone PA-C based on my observations and the provider's statements to me.     This was created at least in part with a voice recognition  software. Mistakes/typos may be present.     [facility] EMERGENCY DEPARTMENT       Adelia Carbone PA  04/26/20 2004

## 2020-04-27 ENCOUNTER — TELEPHONE (OUTPATIENT)
Dept: NEUROSURGERY | Facility: CLINIC | Age: 65
End: 2020-04-27

## 2020-04-27 ENCOUNTER — VIRTUAL VISIT (OUTPATIENT)
Dept: FAMILY MEDICINE | Facility: CLINIC | Age: 65
End: 2020-04-27
Payer: OTHER GOVERNMENT

## 2020-04-27 DIAGNOSIS — G50.0 TRIGEMINAL NEURALGIA: Primary | ICD-10-CM

## 2020-04-27 PROCEDURE — 99442 ZZC PHYSICIAN TELEPHONE EVALUATION 11-20 MIN: CPT | Mod: TEL | Performed by: PHYSICIAN ASSISTANT

## 2020-04-27 RX ORDER — BACLOFEN 20 MG/1
TABLET ORAL
COMMUNITY
Start: 2020-03-12 | End: 2020-12-18

## 2020-04-27 RX ORDER — OXYCODONE HYDROCHLORIDE 5 MG/1
5 TABLET ORAL EVERY 4 HOURS PRN
Qty: 30 TABLET | Refills: 0 | Status: SHIPPED | OUTPATIENT
Start: 2020-04-27 | End: 2020-05-04

## 2020-04-27 NOTE — TELEPHONE ENCOUNTER
[facility] Call Center    Phone Message    May a detailed message be left on voicemail: yes     Reason for Call: Other: Nu callinbg in regards to the referral that was sent from Samina Bone for Nu to be seen for trigeminal neuralgia. Please give Nu a call back at your earliest convenience to discuss scheduling.     Action Taken: Message routed to:  [facility] ([facility]  Neuro Surg    Travel Screening: Not Applicable

## 2020-04-27 NOTE — PROGRESS NOTES
"Nu Taylor is a 64 year old female who is being evaluated via a billable telephone visit.      The patient has been notified of following:     \"This telephone visit will be conducted via a call between you and your physician/provider. We have found that certain health care needs can be provided without the need for a physical exam.  This service lets us provide the care you need with a short phone conversation.  If a prescription is necessary we can send it directly to your pharmacy.  If lab work is needed we can place an order for that and you can then stop by our lab to have the test done at a later time.    Telephone visits are billed at different rates depending on your insurance coverage. During this emergency period, for some insurers they may be billed the same as an in-person visit.  Please reach out to your insurance provider with any questions.    If during the course of the call the physician/provider feels a telephone visit is not appropriate, you will not be charged for this service.\"    Patient has given verbal consent for Telephone visit?  Yes    How would you like to obtain your AVS? MyChart    Subjective     Nu Taylor is a 64 year old female who presents to clinic today for the following health issues:    HPI  ED/UC Followup:    Facility:  [facility]  Date of visit: 4/26/2020  Reason for visit: Facial pain  Current Status: pt says all the medications are making her sick and needs to discuss more options       Additional provider notes:        Oxycodone taking every 4 hours.  It is helping with her pain.  Had to take them at 10:30 and 2:30.   Was also having pain on the left side and moved to the back of her head.         Recent Labs   Lab Test 08/29/19  1116 07/31/18  0823 05/30/17  1524 02/10/16  1028 10/09/15  1707  10/14/13  0740   * 105* 122*  --   --    < > 110   HDL 73 69 75  --   --    < > 62   TRIG 111 85 76  --   --    < > 52   ALT 32  --  26  --   --   --  40   CR 0.62 " 0.66 0.61  --   --   --  0.60   GFRESTIMATED >90 >90 >90  Non African American GFR Calc    --   --   --  >90   GFRESTBLACK >90 >90 >90  African American GFR Calc    --   --   --  >90   POTASSIUM 3.6 3.9 4.1  --   --   --  3.5   TSH  --   --   --  0.96 1.30   < >  --     < > = values in this interval not displayed.      BP Readings from Last 3 Encounters:   12/10/19 132/80   09/13/19 120/70   08/29/19 130/76    Wt Readings from Last 3 Encounters:   12/10/19 70.8 kg (156 lb)   09/13/19 71.2 kg (157 lb)   08/29/19 70.8 kg (156 lb)                    Reviewed and updated as needed this visit by Provider  Tobacco  Allergies  Meds  Problems  Med Hx  Surg Hx  Fam Hx         Review of Systems   ROS COMP: Constitutional, HEENT, cardiovascular, pulmonary, GI, , musculoskeletal, neuro, skin, endocrine and psych systems are negative, except as otherwise noted.       Objective   Reported vitals:  LMP 10/22/2008 (Exact Date)    healthy, alert and no distress  Psych: Alert and oriented times 3; coherent speech, normal   rate and volume, able to articulate logical thoughts, able   to abstract reason, no tangential thoughts, no hallucinations   or delusions  Her affect is normal     Diagnostic Test Results:  Labs reviewed in Epic        Assessment/Plan:  1. Trigeminal neuralgia  Will call with update on Thursday, not improving, my switch carbamezapine to gabapentin.  - NEUROSURGERY REFERRAL  - oxyCODONE (ROXICODONE) 5 MG tablet; Take 1 tablet (5 mg) by mouth every 4 hours as needed for pain  Dispense: 30 tablet; Refill: 0      A physical exam was not possible today due to the COVID19 pandemic crisis for which we are trying to keep all patients safe and at home.  Clinical decision making was based on history as presented by the patient and answers to follow up questions as noted above.  Any lab orders that are needed will be put in as future orders so that the patient can come in for a lab only visit to minimize the amount  of time spent in the clinic.    Return in about 1 week (around 5/4/2020) for Recheck if not improving, phone call to clinic.      Call Start Time: 9:47 AM   Call End Time:  9:59 AM     Phone call duration:  12 minutes      Samina Bone PA-C    Family Medicine  [facility]

## 2020-04-28 ENCOUNTER — TELEPHONE (OUTPATIENT)
Dept: NEUROSURGERY | Facility: CLINIC | Age: 65
End: 2020-04-28

## 2020-04-28 NOTE — TELEPHONE ENCOUNTER
[facility] Call Center    Phone Message    May a detailed message be left on voicemail: yes     Reason for Call: Appointment Intake    Referring Provider Name: Samina Bone    Diagnosis and/or Symptoms: Trigeminal neuralgia     Action Taken: Message routed to:  [facility]surgery    Travel Screening: Not Applicable

## 2020-04-30 ENCOUNTER — TELEPHONE (OUTPATIENT)
Dept: FAMILY MEDICINE | Facility: CLINIC | Age: 65
End: 2020-04-30

## 2020-04-30 NOTE — TELEPHONE ENCOUNTER
Calling with update after starting oxycodone.  Replacing the norco.    Mornings are for the most part good.  Usually has a headache, pain to face, and back of head when she wakes up.  Better standing up than lying down.  Medication has not changed this.    Takes med at 4am, 10am, 4pm and 10pm- this seems to be working for her.    No side effects.      Carbamazepine agreeing with her also.  Taking this every 12 hours, only one capsule.  If she takes 2 during the day, feels like it is too strong, dizzy, tired.  Is going to try 2 capsules in the evening now.      Rating pain 0/10 today.  Headache goes away when she is up.      Feels like these 2 meds are working for her.

## 2020-05-04 ENCOUNTER — VIRTUAL VISIT (OUTPATIENT)
Dept: NEUROSURGERY | Facility: CLINIC | Age: 65
End: 2020-05-04
Payer: OTHER GOVERNMENT

## 2020-05-04 ENCOUNTER — PRE VISIT (OUTPATIENT)
Dept: NEUROSURGERY | Facility: CLINIC | Age: 65
End: 2020-05-04

## 2020-05-04 DIAGNOSIS — G50.0 TRIGEMINAL NEURALGIA: Primary | ICD-10-CM

## 2020-05-04 DIAGNOSIS — G50.0 TRIGEMINAL NEURALGIA: ICD-10-CM

## 2020-05-04 RX ORDER — OXYCODONE HYDROCHLORIDE 5 MG/1
5 TABLET ORAL EVERY 6 HOURS PRN
Qty: 28 TABLET | Refills: 0 | Status: SHIPPED | OUTPATIENT
Start: 2020-05-04 | End: 2020-05-21

## 2020-05-04 ASSESSMENT — PAIN SCALES - GENERAL: PAINLEVEL: SEVERE PAIN (7)

## 2020-05-04 NOTE — TELEPHONE ENCOUNTER
Routing refill request to provider for review/approval because:  Drug not on the [facility] refill protocol       [place] reviewed:  reviewed May 4, 2020- no concerns  [place]  [email or web address]

## 2020-05-04 NOTE — PROGRESS NOTES
"Nu Taylor is a 64 year old female who is being evaluated via a billable video visit.      The patient has been notified of following:     \"This video visit will be conducted via a call between you and your physician/provider. We have found that certain health care needs can be provided without the need for an in-person physical exam.  This service lets us provide the care you need with a video conversation.  If a prescription is necessary we can send it directly to your pharmacy.  If lab work is needed we can place an order for that and you can then stop by our lab to have the test done at a later time.    Video visits are billed at different rates depending on your insurance coverage.  Please reach out to your insurance provider with any questions.    If during the course of the call the physician/provider feels a video visit is not appropriate, you will not be charged for this service.\"    Patient has given verbal consent for Video visit? YES    How would you like to obtain your AVS?     Patient would like the video invitation sent by: [email or web address]    Will anyone else be joining your video visit?    Video-Visit Details    Type of service:  Video Visit    Video Start Time: 3:49 PM  Video End Time: 4:01 PM    Originating Location (pt. Location): Home    Distant Location (provider location):  [facility] NEUROSURGERY     Platform used for Video Visit: Justice Carter, EMT       Provider Note:    Patient with history of trigeminal neuralgia.  Recently had a relaps requiring hospitalization.  Since then is now on Tegretol 400 mg BID.  On this dose no pain.    Currently happy with management.  Will follow up in 8 weeks.  If pain gets worse will discuss surgical intervention.  "

## 2020-05-04 NOTE — TELEPHONE ENCOUNTER
FUTURE VISIT INFORMATION      FUTURE VISIT INFORMATION:    Date: 5/4/2020    Time: 320pm    Location: [facility]  REFERRAL INFORMATION:    Referring provider:  Samina Bone PA-C     Referring providers clinic:  [facility]     Reason for visit/diagnosis  Trigeminal Neuralgia     RECORDS REQUESTED FROM:       Clinic name Comments Records Status Imaging Status   Internal Samina Bone-4/27/2020, 12/10/2019, 8/29/2019    CT Head 10/9/2015, 3/15/2016 Epic PACS

## 2020-05-04 NOTE — TELEPHONE ENCOUNTER
Pt update as well: taking 2x carbamazepine and she states it is working really well.   States she is taking it every 12 hours.  Now she is taking her oxy every 6 hours now.   She is tapering herself slowly.     Controlled Substance Refill Request for oxyCODONE (ROXICODONE) 5 MG tablet    Problem List Complete:  Yes    Trigeminal neuralgia   9/8/2016         checked in past 3 months?  No, route to RN

## 2020-05-20 DIAGNOSIS — G50.0 TRIGEMINAL NEURALGIA: ICD-10-CM

## 2020-05-20 NOTE — TELEPHONE ENCOUNTER
Controlled Substance Refill Request for oxyCODONE (ROXICODONE) 5 MG tablet   Problem List Complete:  Yes    Trigeminal neuralgia   9/8/2016    Overview    [place] reviewed:  reviewed May 4, 2020- no concerns  [place]  [email or web address]        checked in past 3 months?  Yes 05/04//20

## 2020-05-20 NOTE — TELEPHONE ENCOUNTER
Reason for Call:  Medication or medication refill:    Do you use a [facility]?  Name of the pharmacy and phone number for the current request:  [facility] [address] [place], [place] - [address] [address]      Name of the medication requested: oxyCODONE (ROXICODONE) 5 MG tablet    Other request: The patient called and stated that she is in need of a refill of this medication.     Can we leave a detailed message on this number? YES    Phone number patient can be reached at: Home number on file [phone] (home)    Best Time: Any    Call taken on 5/20/2020 at 12:52 PM by Misti Hunt

## 2020-05-21 RX ORDER — OXYCODONE HYDROCHLORIDE 5 MG/1
5 TABLET ORAL EVERY 6 HOURS PRN
Qty: 28 TABLET | Refills: 0 | Status: SHIPPED | OUTPATIENT
Start: 2020-05-21 | End: 2020-06-04

## 2020-06-02 DIAGNOSIS — G50.0 TRIGEMINAL NEURALGIA: ICD-10-CM

## 2020-06-02 NOTE — TELEPHONE ENCOUNTER
Reason for Call:  Medication or medication refill:  Do you use a [facility]?  Name of the pharmacy and phone number for the current request:  [facility] [address] [facility] [address] [facility]  [phone]  Name of the medication requested: carBAMazepine (CARBATROL) 200 MG 12 hr capsule and oxyCODONE (ROXICODONE) 5 MG tablet  Other request: none  Can we leave a detailed message on this number? YES  Phone number patient can be reached at: Home number on file [phone] (home)  Best Time: any  Call taken on 6/2/2020 at 12:09 PM by AKIKO MATTSON

## 2020-06-02 NOTE — TELEPHONE ENCOUNTER
Controlled Substance Refill Request for oxyCODONE (ROXICODONE) 5 MG tablet     Problem List Complete:  Yes    Trigeminal neuralgia   9/8/2016    Overview    RX [place]) reviewed:  reviewed May 4, 2020- no concerns  [place]:  [email or web address]        checked in past 3 months?  Yes 05/04/20

## 2020-06-02 NOTE — TELEPHONE ENCOUNTER
Routing refill request to provider for review/approval because:  Drug not on the [facility] refill protocol   Labs not current:  Carbazepime levels

## 2020-06-04 RX ORDER — CARBAMAZEPINE 200 MG/1
CAPSULE, EXTENDED RELEASE ORAL
Qty: 180 CAPSULE | Refills: 1 | Status: SHIPPED | OUTPATIENT
Start: 2020-06-04 | End: 2020-07-28 | Stop reason: DRUGHIGH

## 2020-06-04 RX ORDER — OXYCODONE HYDROCHLORIDE 5 MG/1
5 TABLET ORAL EVERY 6 HOURS PRN
Qty: 28 TABLET | Refills: 0 | Status: SHIPPED | OUTPATIENT
Start: 2020-06-04 | End: 2020-06-22

## 2020-06-10 ENCOUNTER — NURSE TRIAGE (OUTPATIENT)
Dept: FAMILY MEDICINE | Facility: CLINIC | Age: 65
End: 2020-06-10

## 2020-06-10 NOTE — TELEPHONE ENCOUNTER
Reason for call:  Patient reporting a symptom    Symptom or request: UTI with bloody urine    Duration (how long have symptoms been present): [facility] but now has bloody urine    Have you been treated for this before? Yes    Additional comments:     Phone Number patient can be reached at:  Cell number on file:    Telephone Information:   Mobile [phone]       Best Time:      Can we leave a detailed message on this number:  YES    Call taken on 6/10/2020 at 1:15 PM by PIOTR ADRIAN

## 2020-06-10 NOTE — TELEPHONE ENCOUNTER
Pt was called with providers response. She will try abx given at clinic and follow up with clinic if symptoms fail to improve.

## 2020-06-10 NOTE — TELEPHONE ENCOUNTER
If she [facility] and got Rx for antibiotic, my guess is that it is the generic for Septra or Bactrim.  That should treat infection and her symptoms

## 2020-06-10 NOTE — TELEPHONE ENCOUNTER
"RN called back to pt.    Pt states UTIs are common for her.   States she finishes urinating and then there is blood.  Pt states the abx she got from this morning at [facility] is \"sulfane\"    No back pain, no flank pain, afebrile, no dizziness or light headedness.     PCP please advise on these sx. Pt was already seen in [facility] this morning.    Additional Information    Negative: Shock suspected (e.g., cold/pale/clammy skin, too weak to stand, low BP, rapid pulse)    Negative: Sounds like a life-threatening emergency to the triager    Negative: Urinary catheter, questions about    Negative: Recent back or abdominal injury    Negative: Recent genital injury    Negative: Unable to urinate (or only a few drops) > 4 hours and bladder feels very full (e.g., palpable bladder or strong urge to urinate)    Negative: Passing pure blood or large blood clots (i.e., larger than a dime or grape)    Negative: Fever > 100.5 F (38.1 C)    Negative: Patient sounds very sick or weak to the triager    Negative: Known sickle cell disease    Negative: Taking Coumadin (warfarin) or other strong blood thinner, or known bleeding disorder (e.g., thrombocytopenia)    Negative: Side (flank) or back pain present    Negative: Pain or burning with passing urine (urination)    Negative: Patient wants to be seen    Negative: Pink or red-colored urine and likely from food (beets, rhubarb, red food dye) and lasts > 24 hours after stopping food    All other patients with blood in urine (Exception: could be normal menstrual bleeding)    Protocols used: URINE - BLOOD IN-A-OH      "

## 2020-06-22 ENCOUNTER — TELEPHONE (OUTPATIENT)
Dept: FAMILY MEDICINE | Facility: CLINIC | Age: 65
End: 2020-06-22

## 2020-06-22 DIAGNOSIS — G50.0 TRIGEMINAL NEURALGIA: ICD-10-CM

## 2020-06-22 RX ORDER — OXYCODONE HYDROCHLORIDE 5 MG/1
5 TABLET ORAL EVERY 6 HOURS PRN
Qty: 28 TABLET | Refills: 0 | Status: SHIPPED | OUTPATIENT
Start: 2020-06-22 | End: 2020-07-28

## 2020-06-22 NOTE — TELEPHONE ENCOUNTER
Reason for Call:  Medication or medication refill:    Do you use a [facility]?  Name of the pharmacy and phone number for the current request:  [facility] [address] [place], [place] - [address]      Name of the medication requested: oxyCODONE (ROXICODONE) 5 MG tablet    Other request: The patient called and stated that she needs a refill for this medication.  The patient will be out soon so she will need this as soon as possible.     Can we leave a detailed message on this number? YES    Phone number patient can be reached at: Home number on file [phone] (home)    Best Time: Any    Call taken on 6/22/2020 at 8:41 AM by Misti Hunt

## 2020-06-22 NOTE — TELEPHONE ENCOUNTER
Pt is requesting providers advice on how to take medications. Notes she has been taking Oxycodone and Carbamazepine every 16 hours. She has pain relief while taking both medications but would prefer to be off Oxycodone. Pt asked if she could take Carbamazepine 3 times daily and stop Oxycodone or should she taper to 0.5 tablet first. Please advice.

## 2020-06-22 NOTE — TELEPHONE ENCOUNTER
Spoke with patient.     Provider message was shared.     Plans to decrease and stop her Oxycodone.     FYI to PCP.   States he is currently taking the carbamazepine 2 tabs BID. She is not currently taking 3 tabs BID. 3 makes her tired and falling asleep during the day. Not currently taking baclofen.     Will keep all her medications that same when going off Oxycodone. Will update the clinic is pain does start up again.

## 2020-06-22 NOTE — TELEPHONE ENCOUNTER
Taper oxy to every 8 hours for two days, then every 12 for two days, then once a day for two days.

## 2020-06-22 NOTE — TELEPHONE ENCOUNTER
Controlled Substance Refill Request for oxyCODONE (ROXICODONE) 5 MG tablet     Problem List Complete:  Yes    Trigeminal neuralgia   9/8/2016    Overview    RX [place]) reviewed:  reviewed May 4, 2020- no concerns  [place]:  [email or web address]        checked in past 3 months?  Yes 05/04/20

## 2020-06-22 NOTE — TELEPHONE ENCOUNTER
Reason for Call:  Other call back    Detailed comments: The patient called and stated that she would like to get off of the oxyCODONE (ROXICODONE) 5 MG tablet.  She is wondering if she can increase the dose of carBAMazepine (CARBATROL) 200 MG 12 hr capsule and start reducing the oxyCODONE.  She stated that she is taking the oxyCODONE every 16 hours. She would like a call back to discuss her options.     Phone Number Patient can be reached at: Home number on file [phone] (home)    Best Time: Any    Can we leave a detailed message on this number? YES    Call taken on 6/22/2020 at 8:44 AM by Misti Hunt

## 2020-06-25 ENCOUNTER — TELEPHONE (OUTPATIENT)
Dept: NEUROSURGERY | Facility: CLINIC | Age: 65
End: 2020-06-25

## 2020-06-26 ENCOUNTER — VIRTUAL VISIT (OUTPATIENT)
Dept: FAMILY MEDICINE | Facility: CLINIC | Age: 65
End: 2020-06-26
Payer: OTHER GOVERNMENT

## 2020-06-26 ENCOUNTER — TELEPHONE (OUTPATIENT)
Dept: FAMILY MEDICINE | Facility: CLINIC | Age: 65
End: 2020-06-26

## 2020-06-26 DIAGNOSIS — G50.0 TRIGEMINAL NEURALGIA: Primary | ICD-10-CM

## 2020-06-26 PROCEDURE — 99213 OFFICE O/P EST LOW 20 MIN: CPT | Mod: 95 | Performed by: INTERNAL MEDICINE

## 2020-06-26 RX ORDER — GABAPENTIN 100 MG/1
CAPSULE ORAL
Qty: 180 CAPSULE | Refills: 1 | Status: SHIPPED | OUTPATIENT
Start: 2020-06-26 | End: 2020-07-28 | Stop reason: DRUGHIGH

## 2020-06-26 NOTE — TELEPHONE ENCOUNTER
"Patient calling. Says she is trying to get off oxycodone. Had been taking it every 16 hours but then was told to \"Taper oxy to every 8 hours for two days, then every 12 for two days, then once a day for two days\" (see separate encounter). Patient unsure why she was told to increase it at first in order to go off of it? But notes it felt good to be taking it every 8 hours. Down to one a day now for 2 days. Took one at 830 AM. Before took it this AM had major shakes and a breakdown. Feels she is \"coming up\" now, feels better. Tomorrow will be last day taking once a day. Sunday will be completely off it. Feeling better now after took the oxycodone. Had to eat something with it, had not eaten yet. Says she always will need pain med with trigeminal but does not want to be on oxycodone. Has some baclofen at home-not taking now. Is worried about tonight and this weekend and what to do if she is in pain. Was taking oxycodone every 12 hours for 2 days before this. Had been taking carBAMazepine and oxycodone every 17 hours. Wanted to get off oxycodone to not be addicted. Not sure that baclofen is any better but feels it is not as addicting. Is taking two carBAMazepine every 17 hours. Says one was not enough. Can take 3 but before when was taking 3 and baclofen was putting her to sleep and could not function but says things have changed.Wondering if she can take baclofen? Before was just taking one carbamazepine, has been taking two since April . It was going great with the oxycodone but does not want to be on the oxycodone anymore. Has been off baclofen since went into ER and was switched to oxycodone. Says knows she can make it 16-17 hours but not sure what to do if she has pain after that. Has been decreasing oxycodone for about 1.5 weeks. Says she is going to take an oxycodone more than once a day if she has to. Before runs out face gets pain and goes numb. Major withdrawals this morning and yesterday too with the shakes " and was very sad, patient tearful. Does not want to end up to ER. Has 8 tablets left on the baclofen, had been down to taking it every 12 hours previously instead of TID. Also notes pain is worse in the winter, needing something every 8 hours. Also mentions that the neurologist she was referred to had a  that told her she should only be talking to him about her trigeminal. Should these questions go to him? Had one visit with him previously but then she forgot about visit Monday so rescheduled to 7/28. Should she be calling them instead? Says she was told that the nurse would not be back until Monday.     Please advise. Does she need virtual visit?

## 2020-06-26 NOTE — ASSESSMENT & PLAN NOTE
This is a chronic health problem that is well controlled with current medications and lifestyle measures.  Patient currently takes carbamazepine once in 17 hours, she has been tapered on oxycodone by her PCP and is left on only 2 more doses of oxycodone 1 tablet a day for next 2 days.  Patient is very concerned that she does not have any medication to be taken as needed if she develops severe pain of trigeminal neuralgia.  Did have one episode of possible withdrawal from opiate with mild muscle twitchings and also some depression yesterday but denies any active depression at this time.

## 2020-06-26 NOTE — PROGRESS NOTES
"Nu Taylor is a 64 year old female who is being evaluated via a billable video visit.      The patient has been notified of following:     \"This video visit will be conducted via a call between you and your physician/provider. We have found that certain health care needs can be provided without the need for an in-person physical exam.  This service lets us provide the care you need with a video conversation.  If a prescription is necessary we can send it directly to your pharmacy.  If lab work is needed we can place an order for that and you can then stop by our lab to have the test done at a later time.    Video visits are billed at different rates depending on your insurance coverage.  Please reach out to your insurance provider with any questions.    If during the course of the call the physician/provider feels a video visit is not appropriate, you will not be charged for this service.\"    Patient has given verbal consent for Video visit? Yes    Will anyone else be joining your video visit? No      Subjective     Nu Taylor is a 64 year old female who presents today via video visit for the following health issues:    HPI    Medication Problem      Duration: starting to wean off oxycodone    Description (location/character/radiation): today is first day of taking only one tablet    Intensity:  moderate    Accompanying signs and symptoms: pt is going though withdrawals, having the shakes and crying    History (similar episodes/previous evaluation): None    Precipitating or alleviating factors: None    Therapies tried and outcome: None         Video Start Time: 11.05 AM    Trigeminal neuralgia  This is a chronic health problem that is well controlled with current medications and lifestyle measures.  Patient currently takes carbamazepine once in 17 hours, she has been tapered on oxycodone by her PCP and is left on only 2 more doses of oxycodone 1 tablet a day for next 2 days.  Patient is very concerned that " she does not have any medication to be taken as needed if she develops severe pain of trigeminal neuralgia.  Did have one episode of possible withdrawal from opiate with mild muscle twitchings and also some depression yesterday but denies any active depression at this time.    Patient Active Problem List   Diagnosis     Actinic keratosis     Hallux rigidus     Hypercholesterolemia     Advanced directives, counseling/discussion     Mitral valve stenosis, mild     Urinary frequency     DDD (degenerative disc disease), cervical     Seasonal allergic rhinitis     Trigeminal neuralgia     Complete tear of right rotator cuff     Benign essential hypertension     Past Surgical History:   Procedure Laterality Date     C LIGATION,FALLOPIAN TUBE W/       LAPAROSCOPIC SUSPENSION BLADDER NECK         Social History     Tobacco Use     Smoking status: Never Smoker     Smokeless tobacco: Never Used   Substance Use Topics     Alcohol use: Yes     Alcohol/week: 0.0 standard drinks     Comment: occ     Family History   Problem Relation Age of Onset     No Known Problems Mother      No Known Problems Father      Leukemia Maternal Grandmother      Diabetes Son         type 1     Family History Negative No family hx of      C.A.D. No family hx of      Cancer - colorectal No family hx of            Reviewed and updated as needed this visit by Provider         Review of Systems   Constitutional, HEENT, cardiovascular, pulmonary, GI, , musculoskeletal,skin, endocrine and psych systems are negative, except as otherwise noted.      Objective             Physical Exam     GENERAL: Healthy, alert and no distress  EYES: Eyes grossly normal to inspection.  No discharge or erythema, or obvious scleral/conjunctival abnormalities.  RESP: No audible wheeze, cough, or visible cyanosis.  No visible retractions or increased work of breathing.    SKIN: Visible skin clear. No significant rash, abnormal pigmentation or lesions.  NEURO: Cranial  nerves grossly intact.  Mentation and speech appropriate for age.  PSYCH: Mentation appears normal, affect normal/bright, judgement and insight intact, normal speech and appearance well-groomed.      Diagnostic Test Results:  Labs reviewed in Epic        Video-Visit Details    Type of service:  Video Visit    Video End Time:11.15 AM    Originating Location (pt. Location): Home    Distant Location (provider location):  [facility]     Platform used for Video Visit: [employer]    Assessment and Plan  1. Trigeminal neuralgia  Ongoing problem, uncontrolled.  In the process of tapering down and stopping oxycodone with the last 2 doses.  Will start on alternative gabapentin with all the side effects explained to the patient which he understood, she will let us know if no improvement in 1 week for that we can titrate the dose of medications if needed.  Also to continue current carbamazepine.  - gabapentin (NEURONTIN) 100 MG capsule; Take 1 cap in the morning , 2 cap in afternoon, 3 cap in the night.  Dispense: 180 capsule; Refill: 1     Patient Instructions   As discussed with you on the telephone call today I have ordered gabapentin to be started for you for your trigeminal neuralgia pain.  Let us know if there is no improvement so that we can increase the dose of gabapentin if needed.    Please inform us if you have any further symptoms of withdrawal after stopping the oxycodone.    Continue your current carbamazepine.    ==============================    Patient Education     Trigeminal Neuralgia    You have trigeminal neuralgia. This is pain caused by irritation of the trigeminal nerve on your face. Symptoms include sudden, sharp pain in your head or face. It may feel like an electric shock. It can last for several seconds or minutes. It usually happens on only 1 side of your face. Pain may be triggered by things like moving your jaw or a touch on the skin of your face. The pain may be caused  by something irritating the trigeminal nerve, such as a blood vessel pressing against it. But the exact cause of this problem often isn t known. Although it can be quite painful, the condition isn t dangerous.  Trigeminal neuralgia is often treated with medicines. These include anti-seizure medicines or antidepressants. Certain other treatments may also help. In some cases, you may need surgery.  Home care  Your healthcare provider may prescribe medicines to help relieve and prevent pain. Take all medicines as directed. Please note that it may take several changes in dose and medicines before the right combination is found that controls the pain.  General care:    Plan to rest at home today.    Avoid any specific activities that seem to trigger the pain.    Over the next few weeks, keep a pain diary. Write down when your symptoms happen and how they feel. Certain activities such as touching your face, chewing, talking, or brushing your teeth may bring on the pain. Cold air can also trigger the pain. Make sure you write down any triggers and discuss these with your healthcare provider. This will help guide treatment.  Follow-up care  Follow up with your healthcare provider, or as advised. If you were referred to a neurologist, be sure to make an appointment.  For more information on your condition, visit:    [employer] www.fpa-support.org  When to seek medical advice  Call your healthcare provider right away if any of these occur:    Fever of 100.4 F (38 C) or higher, or as advised    Headache with very stiff neck    You aren t able to keep liquids down (repeated vomiting)    Extreme drowsiness or confusion    Dizziness or fainting    A new feeling of weakness or numbness or tingling in your arm, leg, or face    Difficulty speaking or seeing  Date Last Reviewed: 8/1/2016 2000-2019 [employer]. [address] [place] [address], [place] [address]. All rights reserved. This information is not  intended as a substitute for professional medical care. Always follow your healthcare professional's instructions.                 Return in about 4 weeks (around 7/24/2020), or if symptoms worsen or fail to improve, for If symptoms persist, Follow up of last visit.    Darlin Bach MD  [facility]

## 2020-06-26 NOTE — TELEPHONE ENCOUNTER
OK for virtual visit for evaluation and recommendations.     Thank you,  Darlin Bach MD on 6/26/2020 at 10:04 AM

## 2020-07-28 ENCOUNTER — VIRTUAL VISIT (OUTPATIENT)
Dept: NEUROSURGERY | Facility: CLINIC | Age: 65
End: 2020-07-28
Payer: OTHER GOVERNMENT

## 2020-07-28 DIAGNOSIS — G50.0 TRIGEMINAL NEURALGIA: Primary | ICD-10-CM

## 2020-07-28 RX ORDER — GABAPENTIN 100 MG/1
100 CAPSULE ORAL 2 TIMES DAILY
Qty: 180 CAPSULE | Refills: 1 | Status: SHIPPED | OUTPATIENT
Start: 2020-07-28 | End: 2021-02-10

## 2020-07-28 RX ORDER — CARBAMAZEPINE 200 MG/1
200 TABLET ORAL 2 TIMES DAILY
Qty: 180 TABLET | Refills: 1 | Status: SHIPPED | OUTPATIENT
Start: 2020-07-28 | End: 2021-02-01

## 2020-07-28 NOTE — PATIENT INSTRUCTIONS
Based her well-controlled facial pain symptoms with her current medications, we recommend continue the current regimen. She can follow up with us as needed. Follow up with PCP for other medical management and labs check.     Please do not hesitate to contact us with questions.   At the end of the visit, all the patient's questions and concerns had been addressed and the patient was agreeable with the plan of care as outlined above. The patient has our office contact information and knows to call with any questions, concerns, or changes in her condition.

## 2020-07-28 NOTE — PROGRESS NOTES
"Nu Taylor is a 64 year old female who is being evaluated via a billable video visit.      The patient has been notified of following:     \"This video visit will be conducted via a call between you and your physician/provider. We have found that certain health care needs can be provided without the need for an in-person physical exam.  This service lets us provide the care you need with a video conversation.  If a prescription is necessary we can send it directly to your pharmacy.  If lab work is needed we can place an order for that and you can then stop by our lab to have the test done at a later time.    Video visits are billed at different rates depending on your insurance coverage.  Please reach out to your insurance provider with any questions.    If during the course of the call the physician/provider feels a video visit is not appropriate, you will not be charged for this service.\"    Patient has given verbal consent for Video visit? Yes  How would you like to obtain your AVS? MyChart  If you are dropped from the video visit, the video invite should be resent to: Text to cell phone: [phone]  Will anyone else be joining your video visit? No        Video-Visit Details    Type of service:  Video Visit    Video Start Time: 1118  Video End Time: 1134    Originating Location (pt. Location): Home    Distant Location (provider location):  [facility] NEUROSURGERY     Platform used for Video Visit: Justice Valenzuela NP         We spoke to Ms. Taylor as part of a telephone visit in [facility] today.  Please see our previous notes for complete details.  In summary, we have been following the patient for the management of her right V2 and V3 trigemnial neuralgia started in 2017 with periods of remission, but pain recurrented in 2019. She was recommended for microvascular decompression surgery by Dr. Carter but decided to follow pharmalogical approach first.   She was last seen Dr. Muñoz in 05/04/2020, " reported good pain relief from her carbamazepine and gabapentin.    Today, she is here for follow up with her facial pain. She reported great pain relief with her current regimen. She is taking cabarmazepine 200 mg once a day and gabapentin 100 mg once a day. Some days, she can go 2 days without taking medication. She stopped taking oxycodone for pain. Her pain is occasionally triggered  by cold air,  eating cold or hard foods, and brushing her teeth, but the pain is well-controlled with current medications and she is happy with that. She denies numbness, weakness, vision changes, difficult speaking, swallowing problems, or other neurological deficits.   She will see her PCP for annual physical exam within this month and will have her basic labs checked. She asked for refills of her medication      Over the phone, her phonation, speech and language all seemed normal.  The tone of her voice also seemed appropriate. Facial is symmetrical      IMPRESSION AND PLAN:  Based her well-controlled facial pain symptoms with her current medications, we recommend continue current regimen. She can follow up us as needed. Follow up with PCP for other medical management and labs check.    Please do not hesitate to contact us with questions.   At the end of the visit, all the patient's questions and concerns had been addressed and the patient was agreeable with the plan of care as outlined above. The patient has our office contact information and knows to call with any questions, concerns, or changes in her condition.        Stefanie Valenzuela, ABA  Neurosurgery Nurse Practitioner  [facility]  [phone]

## 2020-07-28 NOTE — LETTER
"7/28/2020       RE: Nu Taylor  [id number] [facility] [address]     Dear Colleague,    Thank you for referring your patient, Nu Taylor, to the [facility] NEUROSURGERY at [facility]. Please see a copy of my visit note below.    Nu Taylor is a 64 year old female who is being evaluated via a billable video visit.      The patient has been notified of following:     \"This video visit will be conducted via a call between you and your physician/provider. We have found that certain health care needs can be provided without the need for an in-person physical exam.  This service lets us provide the care you need with a video conversation.  If a prescription is necessary we can send it directly to your pharmacy.  If lab work is needed we can place an order for that and you can then stop by our lab to have the test done at a later time.    Video visits are billed at different rates depending on your insurance coverage.  Please reach out to your insurance provider with any questions.    If during the course of the call the physician/provider feels a video visit is not appropriate, you will not be charged for this service.\"    Patient has given verbal consent for Video visit? Yes  How would you like to obtain your AVS? MyChart  If you are dropped from the video visit, the video invite should be resent to: Text to cell phone: [phone]  Will anyone else be joining your video visit? No        Video-Visit Details    Type of service:  Video Visit    Video Start Time: 1118  Video End Time: 1134    Originating Location (pt. Location): Home    Distant Location (provider location):  [facility] NEUROSURGERY     Platform used for Video Visit: Justice Valenzulea NP         We spoke to Ms. Taylor as part of a telephone visit in [facility] today.  Please see our previous notes for complete details.  In summary, we have been following the patient for the management of her " right V2 and V3 trigemnial neuralgia started in 2017 with periods of remission, but pain recurrented in 2019. She was recommended for microvascular decompression surgery by Dr. Carter but decided to follow pharmalogical approach first.   She was last seen Dr. Muñoz in 05/04/2020, reported good pain relief from her carbamazepine and gabapentin.    Today, she is here for follow up with her facial pain. She reported great pain relief with her current regimen. She is taking cabarmazepine 200 mg once a day and gabapentin 100 mg once a day. Some days, she can go 2 days without taking medication. She stopped taking oxycodone for pain. Her pain is occasionally triggered  by cold air,  eating cold or hard foods, and brushing her teeth, but the pain is well-controlled with current medications and she is happy with that. She denies numbness, weakness, vision changes, difficult speaking, swallowing problems, or other neurological deficits.   She will see her PCP for annual physical exam within this month and will have her basic labs checked. She asked for refills of her medication      Over the phone, her phonation, speech and language all seemed normal.  The tone of her voice also seemed appropriate. Facial is symmetrical      IMPRESSION AND PLAN:  Based her well-controlled facial pain symptoms with her current medications, we recommend continue current regimen. She can follow up us as needed. Follow up with PCP for other medical management and labs check.    Please do not hesitate to contact us with questions.   At the end of the visit, all the patient's questions and concerns had been addressed and the patient was agreeable with the plan of care as outlined above. The patient has our office contact information and knows to call with any questions, concerns, or changes in her condition.        Stefanie Valenzuela, ABA  Neurosurgery Nurse Practitioner  [facility]  [phone]

## 2020-08-04 ENCOUNTER — OFFICE VISIT (OUTPATIENT)
Dept: FAMILY MEDICINE | Facility: CLINIC | Age: 65
End: 2020-08-04
Payer: OTHER GOVERNMENT

## 2020-08-04 VITALS
DIASTOLIC BLOOD PRESSURE: 80 MMHG | HEART RATE: 77 BPM | SYSTOLIC BLOOD PRESSURE: 136 MMHG | OXYGEN SATURATION: 99 % | BODY MASS INDEX: 29.5 KG/M2 | RESPIRATION RATE: 14 BRPM | TEMPERATURE: 98 F | WEIGHT: 160 LBS

## 2020-08-04 DIAGNOSIS — Z23 NEED FOR VACCINATION: ICD-10-CM

## 2020-08-04 DIAGNOSIS — H61.23 BILATERAL IMPACTED CERUMEN: Primary | ICD-10-CM

## 2020-08-04 PROCEDURE — 99207 ZZC NO CHARGE LOS: CPT | Performed by: PHYSICIAN ASSISTANT

## 2020-08-04 PROCEDURE — 90750 HZV VACC RECOMBINANT IM: CPT | Performed by: PHYSICIAN ASSISTANT

## 2020-08-04 PROCEDURE — 69210 REMOVE IMPACTED EAR WAX UNI: CPT | Mod: 50 | Performed by: PHYSICIAN ASSISTANT

## 2020-08-04 PROCEDURE — 90471 IMMUNIZATION ADMIN: CPT | Performed by: PHYSICIAN ASSISTANT

## 2020-08-04 NOTE — PROGRESS NOTES
Subjective     Nu Taylor is a 64 year old female who presents to clinic today for the following health issues:    HPI       Ear problem      Duration: 1-2 days    Description (location/character/radiation): possible left ear infection, left ear, painful and some jaw pain, no drainage, right ear is full/plugged feeling    Intensity:  mild, moderate    Accompanying signs and symptoms: see above    History (similar episodes/previous evaluation): None    Precipitating or alleviating factors: None    Therapies tried and outcome: None     Reviewed and updated as needed this visit by Provider  Tobacco  Allergies  Meds  Problems  Med Hx  Surg Hx  Fam Hx         Additional complaints: None    HPI additional notes: Nu presents today with   Chief Complaint   Patient presents with     Ear Problem   Tylenol helped somewhat with ear pain.  Was at the dentist yesterday for a cleaning.  Both feel plugged, left feels painful.           Review of Systems   C: Negative for fever, chills, recent change in weight  Skin: Negative for worrisome rashes or lesions  ENT/MOUTH:POSITIVE for ear pressure and history of cerumen impaction.  Negative for ear pain.  Resp: Negative for significant cough or SOB  NEURO: Negative  for headaches or dizziness.  P: Negative for changes in mood or affect  ROS otherwise negative.      Objective    /80   Pulse 77   Temp 98  F (36.7  C) (Tympanic)   Resp 14   Wt 72.6 kg (160 lb)   LMP 10/22/2008 (Exact Date)   SpO2 99%   BMI 29.50 kg/m    Body mass index is 29.5 kg/m .  Physical Exam   GENERAL: healthy, alert, in no acute distress  EYES: Grossly normal to inspection, EOMI, PERRL  HENT: Ear canals severe obstruction with cerumen bilaterally. TM pearly gray without effusion bilaterally visible after irrigation.  NECK: Non-tender, no adenopathy.  SKIN: no suspicious lesions, no rashes  PSYCH: Alert and oriented times 3;  Able to articulate logical thoughts. Affect is  normal.    Diagnostic Test Results: none         Assessment & Plan       ICD-10-CM    1. Bilateral impacted cerumen  H61.23 REMOVE IMPACTED CERUMEN   2. Need for vaccination  Z23 VACCINE ADMINISTRATION, INITIAL     HC ZOSTER VACCINE RECOMBINANT ADJUVANTED IM NJX           Cerumen is noted via otoscopic examination in the bilateral external ear causing severe obstruction with impaction.  Removal deemed medically necessary due loss of hearing secondary to obstruction and pain.  Cerumen was removed by provider with syringing and manual debridement with wax curette. Instructions for home care to prevent wax buildup are given. Pt tolerated procedure well without complication.     See pt instructions.    Return in about 1 month (around 9/4/2020) for Recheck if not improving.    Samina Bone PA-C  [facility]

## 2020-09-04 DIAGNOSIS — G50.0 TRIGEMINAL NEURALGIA: ICD-10-CM

## 2020-09-04 DIAGNOSIS — I10 HTN, GOAL BELOW 140/90: ICD-10-CM

## 2020-09-08 RX ORDER — CARBAMAZEPINE 200 MG/1
CAPSULE, EXTENDED RELEASE ORAL
Qty: 180 CAPSULE | Refills: 0 | OUTPATIENT
Start: 2020-09-08

## 2020-09-08 RX ORDER — AMLODIPINE AND BENAZEPRIL HYDROCHLORIDE 10; 40 MG/1; MG/1
CAPSULE ORAL
Qty: 90 CAPSULE | Refills: 2 | Status: SHIPPED | OUTPATIENT
Start: 2020-09-08 | End: 2021-06-01

## 2020-09-08 NOTE — TELEPHONE ENCOUNTER
Spoke with patient     The carbamazepine that was requested by pharmacy is incorrect. She does not take that dose of medication at this time and has enough of this medication.     Medication was denied, per patient.     Patient does need a refill of her amlodipine- benazepril medication. This was pended and sent to the refill pool.     9/9/physical scheduled.     Patient thought her appointment was for today at 1:30, actually scheduled for her and her son tomorrow at 1:10pm and 1:30pm.

## 2020-09-09 ENCOUNTER — OFFICE VISIT (OUTPATIENT)
Dept: FAMILY MEDICINE | Facility: CLINIC | Age: 65
End: 2020-09-09
Payer: OTHER GOVERNMENT

## 2020-09-09 VITALS
WEIGHT: 183 LBS | TEMPERATURE: 97.5 F | HEIGHT: 65 IN | SYSTOLIC BLOOD PRESSURE: 136 MMHG | DIASTOLIC BLOOD PRESSURE: 86 MMHG | RESPIRATION RATE: 14 BRPM | BODY MASS INDEX: 30.49 KG/M2 | HEART RATE: 82 BPM

## 2020-09-09 DIAGNOSIS — H61.23 BILATERAL IMPACTED CERUMEN: ICD-10-CM

## 2020-09-09 DIAGNOSIS — E78.00 HYPERCHOLESTEROLEMIA: ICD-10-CM

## 2020-09-09 DIAGNOSIS — I10 BENIGN ESSENTIAL HYPERTENSION: ICD-10-CM

## 2020-09-09 DIAGNOSIS — Z00.00 ROUTINE GENERAL MEDICAL EXAMINATION AT A HEALTH CARE FACILITY: Primary | ICD-10-CM

## 2020-09-09 DIAGNOSIS — B07.0 PLANTAR WARTS: ICD-10-CM

## 2020-09-09 DIAGNOSIS — I05.0 MITRAL VALVE STENOSIS, MILD: ICD-10-CM

## 2020-09-09 DIAGNOSIS — G50.0 TRIGEMINAL NEURALGIA: ICD-10-CM

## 2020-09-09 LAB
BASOPHILS # BLD AUTO: 0 10E9/L (ref 0–0.2)
BASOPHILS NFR BLD AUTO: 0.7 %
DIFFERENTIAL METHOD BLD: NORMAL
EOSINOPHIL # BLD AUTO: 0.1 10E9/L (ref 0–0.7)
EOSINOPHIL NFR BLD AUTO: 1.2 %
ERYTHROCYTE [DISTWIDTH] IN BLOOD BY AUTOMATED COUNT: 12 % (ref 10–15)
HBA1C MFR BLD: 5 % (ref 0–5.6)
HCT VFR BLD AUTO: 39.5 % (ref 35–47)
HGB BLD-MCNC: 13.3 G/DL (ref 11.7–15.7)
LYMPHOCYTES # BLD AUTO: 2.4 10E9/L (ref 0.8–5.3)
LYMPHOCYTES NFR BLD AUTO: 42.6 %
MCH RBC QN AUTO: 31.6 PG (ref 26.5–33)
MCHC RBC AUTO-ENTMCNC: 33.7 G/DL (ref 31.5–36.5)
MCV RBC AUTO: 94 FL (ref 78–100)
MONOCYTES # BLD AUTO: 0.4 10E9/L (ref 0–1.3)
MONOCYTES NFR BLD AUTO: 6.3 %
NEUTROPHILS # BLD AUTO: 2.8 10E9/L (ref 1.6–8.3)
NEUTROPHILS NFR BLD AUTO: 49.2 %
PLATELET # BLD AUTO: 323 10E9/L (ref 150–450)
RBC # BLD AUTO: 4.21 10E12/L (ref 3.8–5.2)
WBC # BLD AUTO: 5.7 10E9/L (ref 4–11)

## 2020-09-09 PROCEDURE — 17110 DESTRUCTION B9 LES UP TO 14: CPT | Performed by: FAMILY MEDICINE

## 2020-09-09 PROCEDURE — 80061 LIPID PANEL: CPT | Performed by: FAMILY MEDICINE

## 2020-09-09 PROCEDURE — 69210 REMOVE IMPACTED EAR WAX UNI: CPT | Mod: 50 | Performed by: FAMILY MEDICINE

## 2020-09-09 PROCEDURE — 90471 IMMUNIZATION ADMIN: CPT | Performed by: FAMILY MEDICINE

## 2020-09-09 PROCEDURE — 90682 RIV4 VACC RECOMBINANT DNA IM: CPT | Performed by: FAMILY MEDICINE

## 2020-09-09 PROCEDURE — 99396 PREV VISIT EST AGE 40-64: CPT | Mod: 25 | Performed by: FAMILY MEDICINE

## 2020-09-09 PROCEDURE — 80053 COMPREHEN METABOLIC PANEL: CPT | Performed by: FAMILY MEDICINE

## 2020-09-09 PROCEDURE — 83036 HEMOGLOBIN GLYCOSYLATED A1C: CPT | Performed by: FAMILY MEDICINE

## 2020-09-09 PROCEDURE — 36415 COLL VENOUS BLD VENIPUNCTURE: CPT | Performed by: FAMILY MEDICINE

## 2020-09-09 PROCEDURE — 99214 OFFICE O/P EST MOD 30 MIN: CPT | Mod: 25 | Performed by: FAMILY MEDICINE

## 2020-09-09 PROCEDURE — 85025 COMPLETE CBC W/AUTO DIFF WBC: CPT | Performed by: FAMILY MEDICINE

## 2020-09-09 ASSESSMENT — MIFFLIN-ST. JEOR: SCORE: 1373.02

## 2020-09-09 NOTE — PROGRESS NOTES
SUBJECTIVE:   CC: Nu Taylor is an 65 year old woman who presents for preventive health visit.     Healthy Habits:    Do you get at least three servings of calcium containing foods daily (dairy, green leafy vegetables, etc.)? yes    Amount of exercise or daily activities, outside of work: 1 day(s) per week    Problems taking medications regularly No    Medication side effects: No    Have you had an eye exam in the past two years? yes    Do you see a dentist twice per year? yes    Do you have sleep apnea, excessive snoring or daytime drowsiness?no      WART(S)      Onset: since childhood    Description (location/number): 1 on rt foot    Accompanying signs and symptoms: Painful: no    History: prior warts: YES    Therapies tried and outcome: previous cryotherapy treatments      Today's PHQ-2 Score:   PHQ-2 ( 1999 [employer]) 9/9/2020 6/26/2020   Q1: Little interest or pleasure in doing things 0 0   Q2: Feeling down, depressed or hopeless 0 0   PHQ-2 Score 0 0   Q1: Little interest or pleasure in doing things - -   Q2: Feeling down, depressed or hopeless - -   PHQ-2 Score - -       Abuse: Current or Past(Physical, Sexual or Emotional)- No  Do you feel safe in your environment? Yes        Social History     Tobacco Use     Smoking status: Never Smoker     Smokeless tobacco: Never Used   Substance Use Topics     Alcohol use: Yes     Alcohol/week: 0.0 standard drinks     Comment: occ     If you drink alcohol do you typically have >3 drinks per day or >7 drinks per week? No                     Reviewed orders with patient.  Reviewed health maintenance and updated orders accordingly - Yes  Lab work is in process  Labs reviewed in EPIC      Pertinent mammograms are reviewed under the imaging tab.  History of abnormal Pap smear: NO - age 30-65 PAP every 5 years with negative HPV co-testing recommended  PAP / HPV Latest Ref Rng & Units 8/27/2018 4/3/2015 10/18/2011   PAP - NIL NIL NIL   HPV 16 DNA NEG:Negative Negative  "Negative -   HPV 18 DNA NEG:Negative Negative Negative -   OTHER HR HPV NEG:Negative Negative Negative -     Reviewed and updated as needed this visit by clinical staff  Tobacco  Allergies  Meds  Problems  Med Hx  Surg Hx  Fam Hx  Soc Hx          Reviewed and updated as needed this visit by Provider  Tobacco  Allergies  Meds  Problems  Med Hx  Surg Hx  Fam Hx          Past Medical History:   Diagnosis Date     Allergic rhinitis due to other allergen      HTN (hypertension)      Hyperlipidemia LDL goal <130 2008      Past Surgical History:   Procedure Laterality Date     C LIGATION,FALLOPIAN TUBE W/       LAPAROSCOPIC SUSPENSION BLADDER NECK       OB History   No obstetric history on file.       ROS:  CONSTITUTIONAL: NEGATIVE for fever, chills, change in weight  INTEGUMENTARY/SKIN: See HPI  EYES: NEGATIVE for vision changes or irritation  ENT: NEGATIVE for ear, mouth and throat problems  RESP: NEGATIVE for significant cough or SOB  BREAST: NEGATIVE for masses, tenderness or discharge  CV: NEGATIVE for chest pain, palpitations or peripheral edema  GI: NEGATIVE for nausea, abdominal pain, heartburn, or change in bowel habits  : NEGATIVE for unusual urinary or vaginal symptoms. No vaginal bleeding.  MUSCULOSKELETAL: NEGATIVE for significant arthralgias or myalgia  NEURO: NEGATIVE for weakness, dizziness or paresthesias  PSYCHIATRIC: NEGATIVE for changes in mood or affect     OBJECTIVE:   /86 (Cuff Size: Adult Regular)   Pulse 82   Temp 97.5  F (36.4  C) (Tympanic)   Resp 14   Ht 1.638 m (5' 4.5\")   Wt 83 kg (183 lb)   LMP 10/22/2008 (Exact Date)   BMI 30.93 kg/m    EXAM:  GENERAL APPEARANCE: healthy, alert and no distress  EYES: Eyes grossly normal to inspection, PERRL and conjunctivae and sclerae normal  HENT: both ears: occluded with wax  NECK: no adenopathy, no asymmetry, masses, or scars and thyroid normal to palpation  RESP: lungs clear to auscultation - no rales, rhonchi " or wheezes  BREAST: declined exam  CV: regular rate and rhythm, normal S1 S2, no S3 or S4, no murmur, click or rub, no peripheral edema and peripheral pulses strong  ABDOMEN: soft, nontender, no hepatosplenomegaly, no masses and bowel sounds normal   (female): declined exam  MS: no musculoskeletal defects are noted and gait is age appropriate without ataxia  SKIN: 1 large plantar wart located on bottom of right foot  NEURO: Normal strength and tone, sensory exam grossly normal, mentation intact and speech normal  PSYCH: mentation appears normal and affect normal/bright    Diagnostic Test Results:  Labs reviewed in Epic    ASSESSMENT/PLAN:   Nu was seen today for physical and ear problem.    Diagnoses and all orders for this visit:    Routine general medical examination at a health care facility  -     C RIV4 (FLUBLOK) VACCINE RECOMBINANT DNA PRSRV ANTIBIO FREE, IM [[id number]]  -     Lipid Profile  -     Hemoglobin A1c  -     Comprehensive metabolic panel (BMP + Alb, Alk Phos, ALT, AST, Total. Bili, TP)  -     CBC with platelets and differential    Plantar warts    Bilateral impacted cerumen    Hypercholesterolemia    Benign essential hypertension    Mitral valve stenosis, mild    Trigeminal neuralgia        1.  Ear wax:  Right ear canal was found to be occluded with wax - my nurse and I were able to remove the ear wax successfully with irrigation (half warm water and half hydrogen peroxide.)  Hearing and plugged-feeling (in right ear) improved significantly.  Will return to clinic if symptoms persist or worsen.  See Patient Instructions for details and follow-up instructions  Information on cerumen impaction/removal, home care discussed with patient who expressed understanding.    2.  PROCEDURE NOTE: Cryotherapy (Wart removal)  --Warts:  The plantar wart was frozen easily three times with liquid nitrogen.  A total of 1 wart was treated today on the right foot and the patient tolerated the procedure  well.  --Care/management of warts were discussed; handout provided as well.  After freezing a wart it is possible that a blood blister will form in that area. If so, it will resolve on its own and does not need to be drained.   Watch for the wart to fall off. If it has not fallen off in 2 to 3 weeks, make an appointment for follow up treatment(s).   If you have mild pain you may take acetaminophen (Tylenol ). You may take ibuprofen (Motrin  or Advil ) with meals PRN Pain.  For additional care, use OTC wart treatments (compound w or generic) daily.  Gently scrub the wart with a pumis stone or emery board to remove the top layers.  Watch for these signs of infection: redness, swelling, drainage, warmth to touch, increased pain, or fever. Call the clinic or make an appointment to be seen if you think you have an infection. You may need to take an antibiotic.   Call or come back to the clinic if your symptoms worsen or do not improve.   Wash your hands well to prevent spreading the wart to other locations or other people.    3.  HTN, HLD:  --Will check fasting labs  --Continue Lotrel and simvastatin    4.  Trigeminal Neuralgia:  Continue Gabapentin, Baclofen and Carbamazepine       COUNSELING:   Reviewed preventive health counseling, as reflected in patient instructions  Special attention given to:        Regular exercise       Healthy diet/nutrition       Vision screening       Hearing screening       Immunizations    Vaccinated for: Influenza             Osteoporosis Prevention/Bone Health       Colon cancer screening       The 10-year ASCVD risk score (Benjy ARREDONDO Jr., et al., 2013) is: 6.7%    Values used to calculate the score:      Age: 65 years      Sex: Female      Is Non- : No      Diabetic: No      Tobacco smoker: No      Systolic Blood Pressure: 136 mmHg      Is BP treated: Yes      HDL Cholesterol: 73 mg/dL      Total Cholesterol: 206 mg/dL       Advance Care Planning    Estimated body  "mass index is 30.93 kg/m  as calculated from the following:    Height as of this encounter: 1.638 m (5' 4.5\").    Weight as of this encounter: 83 kg (183 lb).    Weight management plan: Discussed healthy diet and exercise guidelines    She reports that she has never smoked. She has never used smokeless tobacco.      Counseling Resources:  ATP IV Guidelines  Pooled Cohorts Equation Calculator  Breast Cancer Risk Calculator  BRCA-Related Cancer Risk Assessment: FHS-7 Tool  FRAX Risk Assessment  ICSI Preventive Guidelines  Dietary Guidelines for Americans, 2010  USDA's MyPlate  ASA Prophylaxis  Lung CA Screening    Kellie Bro, DO  [facility]  "

## 2020-09-10 LAB
ALBUMIN SERPL-MCNC: 4.3 G/DL (ref 3.4–5)
ALP SERPL-CCNC: 104 U/L (ref 40–150)
ALT SERPL W P-5'-P-CCNC: 41 U/L (ref 0–50)
ANION GAP SERPL CALCULATED.3IONS-SCNC: 7 MMOL/L (ref 3–14)
AST SERPL W P-5'-P-CCNC: 17 U/L (ref 0–45)
BILIRUB SERPL-MCNC: 0.3 MG/DL (ref 0.2–1.3)
BUN SERPL-MCNC: 12 MG/DL (ref 7–30)
CALCIUM SERPL-MCNC: 9.2 MG/DL (ref 8.5–10.1)
CHLORIDE SERPL-SCNC: 106 MMOL/L (ref 94–109)
CHOLEST SERPL-MCNC: 243 MG/DL
CO2 SERPL-SCNC: 27 MMOL/L (ref 20–32)
CREAT SERPL-MCNC: 0.69 MG/DL (ref 0.52–1.04)
GFR SERPL CREATININE-BSD FRML MDRD: >90 ML/MIN/{1.73_M2}
GLUCOSE SERPL-MCNC: 96 MG/DL (ref 70–99)
HDLC SERPL-MCNC: 87 MG/DL
LDLC SERPL CALC-MCNC: 141 MG/DL
NONHDLC SERPL-MCNC: 156 MG/DL
POTASSIUM SERPL-SCNC: 3.7 MMOL/L (ref 3.4–5.3)
PROT SERPL-MCNC: 8.2 G/DL (ref 6.8–8.8)
SODIUM SERPL-SCNC: 140 MMOL/L (ref 133–144)
TRIGL SERPL-MCNC: 76 MG/DL

## 2020-10-15 ENCOUNTER — OFFICE VISIT (OUTPATIENT)
Dept: FAMILY MEDICINE | Facility: CLINIC | Age: 65
End: 2020-10-15
Payer: OTHER GOVERNMENT

## 2020-10-15 VITALS
RESPIRATION RATE: 16 BRPM | DIASTOLIC BLOOD PRESSURE: 82 MMHG | TEMPERATURE: 98.4 F | SYSTOLIC BLOOD PRESSURE: 136 MMHG | OXYGEN SATURATION: 97 % | HEART RATE: 88 BPM | WEIGHT: 163.6 LBS | HEIGHT: 63 IN | BODY MASS INDEX: 28.99 KG/M2

## 2020-10-15 DIAGNOSIS — B07.0 PLANTAR WART: Primary | ICD-10-CM

## 2020-10-15 PROCEDURE — 99207 PR NO CHARGE LOS: CPT | Performed by: PHYSICIAN ASSISTANT

## 2020-10-15 PROCEDURE — 17110 DESTRUCTION B9 LES UP TO 14: CPT | Performed by: PHYSICIAN ASSISTANT

## 2020-10-15 ASSESSMENT — ANXIETY QUESTIONNAIRES
GAD7 TOTAL SCORE: 1
6. BECOMING EASILY ANNOYED OR IRRITABLE: NOT AT ALL
5. BEING SO RESTLESS THAT IT IS HARD TO SIT STILL: NOT AT ALL
7. FEELING AFRAID AS IF SOMETHING AWFUL MIGHT HAPPEN: NOT AT ALL
1. FEELING NERVOUS, ANXIOUS, OR ON EDGE: NOT AT ALL
7. FEELING AFRAID AS IF SOMETHING AWFUL MIGHT HAPPEN: NOT AT ALL
GAD7 TOTAL SCORE: 1
4. TROUBLE RELAXING: NOT AT ALL
3. WORRYING TOO MUCH ABOUT DIFFERENT THINGS: SEVERAL DAYS
2. NOT BEING ABLE TO STOP OR CONTROL WORRYING: NOT AT ALL
GAD7 TOTAL SCORE: 1

## 2020-10-15 ASSESSMENT — MIFFLIN-ST. JEOR: SCORE: 1261.21

## 2020-10-15 ASSESSMENT — PATIENT HEALTH QUESTIONNAIRE - PHQ9
SUM OF ALL RESPONSES TO PHQ QUESTIONS 1-9: 3
10. IF YOU CHECKED OFF ANY PROBLEMS, HOW DIFFICULT HAVE THESE PROBLEMS MADE IT FOR YOU TO DO YOUR WORK, TAKE CARE OF THINGS AT HOME, OR GET ALONG WITH OTHER PEOPLE: NOT DIFFICULT AT ALL
SUM OF ALL RESPONSES TO PHQ QUESTIONS 1-9: 3

## 2020-10-15 NOTE — PROGRESS NOTES
Subjective     Nu Taylor is a 65 year old female who presents to clinic today for the following health issues:     WART(S)  Onset: couple years    Description:   Location: bottom right foot  Number of warts: 1  Painful: YES    Accompanying Signs & Symptoms:  Signs of infection: no     History:   History of trauma: no   Prior warts: YES    Therapies Tried and outcome: liquid nitrogen and OTC meds - Last treatment about a month ago          Assessment & Plan     Plantar wart  Dermablade used to pare down dead skin and lesion frozen with liquid nitrogen x3. Patient tolerated procedure well.             Patient Instructions   Use of OTC product (salicylic acid) starting in a few days.  Gentle abrasion with pumice stone or emery board with good handwashing after.  Return in 2 weeks for refreezing until resolved.        Return if symptoms worsen or fail to improve.    Susan Cordero PA-C  [facility]    Answers for HPI/ROS submitted by the patient on 10/15/2020   Chronic problems general questions HPI Form  If you checked off any problems, how difficult have these problems made it for you to do your work, take care of things at home, or get along with other people?: Not difficult at all  PHQ9 TOTAL SCORE: 3  MAURO 7 TOTAL SCORE: 1

## 2020-10-15 NOTE — PATIENT INSTRUCTIONS
Use of OTC product (salicylic acid) starting in a few days.  Gentle abrasion with pumice stone or emery board with good handwashing after.  Return in 2 weeks for refreezing until resolved.

## 2020-10-16 ASSESSMENT — PATIENT HEALTH QUESTIONNAIRE - PHQ9: SUM OF ALL RESPONSES TO PHQ QUESTIONS 1-9: 3

## 2020-10-16 ASSESSMENT — ANXIETY QUESTIONNAIRES: GAD7 TOTAL SCORE: 1

## 2020-10-30 ENCOUNTER — TRANSFERRED RECORDS (OUTPATIENT)
Dept: HEALTH INFORMATION MANAGEMENT | Facility: CLINIC | Age: 65
End: 2020-10-30

## 2020-11-09 ENCOUNTER — ANCILLARY PROCEDURE (OUTPATIENT)
Dept: MAMMOGRAPHY | Facility: CLINIC | Age: 65
End: 2020-11-09
Payer: OTHER GOVERNMENT

## 2020-11-09 DIAGNOSIS — Z12.31 VISIT FOR SCREENING MAMMOGRAM: ICD-10-CM

## 2020-11-09 PROCEDURE — 77063 BREAST TOMOSYNTHESIS BI: CPT | Performed by: RADIOLOGY

## 2020-11-09 PROCEDURE — 77067 SCR MAMMO BI INCL CAD: CPT | Performed by: RADIOLOGY

## 2020-11-23 ENCOUNTER — OFFICE VISIT (OUTPATIENT)
Dept: FAMILY MEDICINE | Facility: CLINIC | Age: 65
End: 2020-11-23
Payer: OTHER GOVERNMENT

## 2020-11-23 VITALS
TEMPERATURE: 98.9 F | RESPIRATION RATE: 20 BRPM | DIASTOLIC BLOOD PRESSURE: 74 MMHG | SYSTOLIC BLOOD PRESSURE: 128 MMHG | BODY MASS INDEX: 28.52 KG/M2 | HEART RATE: 84 BPM | WEIGHT: 161 LBS

## 2020-11-23 DIAGNOSIS — B07.0 PLANTAR WART: Primary | ICD-10-CM

## 2020-11-23 PROCEDURE — 99207 PR DROP WITH A PROCEDURE: CPT | Performed by: NURSE PRACTITIONER

## 2020-11-23 PROCEDURE — 17110 DESTRUCTION B9 LES UP TO 14: CPT | Performed by: NURSE PRACTITIONER

## 2020-11-23 NOTE — PATIENT INSTRUCTIONS
Patient Education     Understanding Plantar Warts    A plantar wart is a small, noncancerous growth on the bottom of the foot. Plantar warts often develop where friction or pressure occurs, such as on the ball of the foot. The word plantar refers to the sole of the foot. Similar warts can occur on other areas of the body such as the hands. Plantar warts are more common in children and young adults.  What causes a plantar wart?  Plantar warts are caused by a virus called human papillomavirus (HPV).  They can be spread by person-to-person contact. Or you can develop one if you walk barefoot on moist surfaces infected with the virus. This might be in a community pool area or locker room. Wearing correct footwear in such places can prevent them.  What are the symptoms of plantar warts?  Plantar warts cause a thick, rough, and often raised patch of skin on the bottom of the foot. The wart may have black dots on it. These dots are dried blood. The wart may cause pain or discomfort. You may also have trouble walking because of the pain.  How are plantar warts treated?  Many plantar warts go away without any treatment. But for those that are painful or that don t go away, several treatments are available. These include:    Salicylic acid. This treatment is put directly on the wart. It may come in the form of a liquid, ointment, pad, or patch. It is available over the counter. Don't use salicylic acid treatment for more than 12 weeks without talking with your healthcare provider.    Cryotherapy. Your healthcare provider puts liquid nitrogen on the wart with a cotton swab or spray. This treatment might be painful.    Medicine. A variety of medicines can be put on or injected into the wart. But research is mixed on how well they work.  There are many folk remedies for plantar warts, but some of these are unproven and can be dangerous. Talk with your healthcare provider about safe methods to try. Often your healthcare provider  will cut away dead parts of the wart before using other treatments.    When should I call my healthcare provider?  Call your healthcare provider if you have plantar warts that become too painful and don't go away on their own or with over-the-counter and at-home treatments.  [place] last reviewed this educational content on 8/1/2018 2000-2020 [employer] [employer]. [address] [place] [address], [place] [address]. All rights reserved. This information is not intended as a substitute for professional medical care. Always follow your healthcare professional's instructions.

## 2020-11-27 DIAGNOSIS — E78.5 HYPERLIPIDEMIA LDL GOAL <130: ICD-10-CM

## 2020-11-27 RX ORDER — SIMVASTATIN 20 MG
TABLET ORAL
Qty: 90 TABLET | Refills: 1 | Status: SHIPPED | OUTPATIENT
Start: 2020-11-27 | End: 2021-06-01

## 2020-11-27 NOTE — TELEPHONE ENCOUNTER
Routing refill request to provider for review/approval because:  Drug interaction warning    Annalisa Westfall, RN

## 2020-12-18 ENCOUNTER — OFFICE VISIT (OUTPATIENT)
Dept: FAMILY MEDICINE | Facility: CLINIC | Age: 65
End: 2020-12-18
Payer: OTHER GOVERNMENT

## 2020-12-18 VITALS
HEART RATE: 80 BPM | DIASTOLIC BLOOD PRESSURE: 74 MMHG | WEIGHT: 162 LBS | BODY MASS INDEX: 28.7 KG/M2 | TEMPERATURE: 98.1 F | RESPIRATION RATE: 16 BRPM | SYSTOLIC BLOOD PRESSURE: 122 MMHG

## 2020-12-18 DIAGNOSIS — R30.0 DYSURIA: Primary | ICD-10-CM

## 2020-12-18 DIAGNOSIS — M25.562 PAIN AND SWELLING OF LEFT KNEE: ICD-10-CM

## 2020-12-18 DIAGNOSIS — B07.0 PLANTAR WARTS: ICD-10-CM

## 2020-12-18 DIAGNOSIS — M25.462 PAIN AND SWELLING OF LEFT KNEE: ICD-10-CM

## 2020-12-18 LAB
ALBUMIN UR-MCNC: 30 MG/DL
AMORPH CRY #/AREA URNS HPF: ABNORMAL /HPF
APPEARANCE UR: ABNORMAL
BACTERIA #/AREA URNS HPF: ABNORMAL /HPF
BILIRUB UR QL STRIP: NEGATIVE
COLOR UR AUTO: YELLOW
GLUCOSE UR STRIP-MCNC: NEGATIVE MG/DL
HGB UR QL STRIP: ABNORMAL
KETONES UR STRIP-MCNC: NEGATIVE MG/DL
LEUKOCYTE ESTERASE UR QL STRIP: ABNORMAL
NITRATE UR QL: NEGATIVE
NON-SQ EPI CELLS #/AREA URNS LPF: ABNORMAL /LPF
PH UR STRIP: 7 PH (ref 5–7)
RBC #/AREA URNS AUTO: ABNORMAL /HPF
SOURCE: ABNORMAL
SP GR UR STRIP: 1.02 (ref 1–1.03)
SPECIMEN SOURCE: NORMAL
UROBILINOGEN UR STRIP-ACNC: 0.2 EU/DL (ref 0.2–1)
WBC #/AREA URNS AUTO: ABNORMAL /HPF
WET PREP SPEC: NORMAL

## 2020-12-18 PROCEDURE — 17000 DESTRUCT PREMALG LESION: CPT | Performed by: FAMILY MEDICINE

## 2020-12-18 PROCEDURE — 81001 URINALYSIS AUTO W/SCOPE: CPT | Performed by: FAMILY MEDICINE

## 2020-12-18 PROCEDURE — 99214 OFFICE O/P EST MOD 30 MIN: CPT | Mod: 25 | Performed by: FAMILY MEDICINE

## 2020-12-18 PROCEDURE — 87210 SMEAR WET MOUNT SALINE/INK: CPT | Performed by: FAMILY MEDICINE

## 2020-12-18 RX ORDER — SULFAMETHOXAZOLE/TRIMETHOPRIM 800-160 MG
1 TABLET ORAL 2 TIMES DAILY
Qty: 6 TABLET | Refills: 0 | Status: SHIPPED | OUTPATIENT
Start: 2020-12-18 | End: 2020-12-21

## 2020-12-18 NOTE — PROGRESS NOTES
Subjective     Nu Taylor is a 65 year old female who presents to clinic today for the following health issues:    HPI         Genitourinary - Female  Onset/Duration: x2 days  Description:   Painful urination (Dysuria): YES           Frequency: YES  Blood in urine (Hematuria): YES  Delay in urine (Hesitency): YES  Intensity: moderate  Progression of Symptoms:  worsening  Accompanying Signs & Symptoms:  Fever/chills: no  Flank pain: no  Nausea and vomiting: no  Vaginal symptoms: none  Abdominal/Pelvic Pain: no  History:   History of frequent UTI s: YES  History of kidney stones: no  Sexually Active: YES  Possibility of pregnancy: No  Precipitating or alleviating factors: None  Therapies tried and outcome:  none       Here today for dysuria, wonders about possible yeast infection.  Some bleeding and discomfort, says this is just like her episode last year when she had a bladder infection    Would like plantar wart frozen today, has had previous cryotherapy, reports home paring and OTC salicylic acid treatment with minimal improvement.    Would like letter for CDL, needs letter stating her seizure medication is not for epilepsy, that it is for her trigeminal neuralgia.    Left leg pain, swelling in her knee.  Put ice pack on to help.  Swelling normally goes down, but did not go down overnight.  Prolonged sitting often causes worsening pain and swelling.  No previous injuries to left knee.    Review of Systems   Constitutional, HEENT, cardiovascular, pulmonary, gi and gu systems are negative, except as otherwise noted.      Objective    Temp 98.1  F (36.7  C) (Oral)   Wt 73.5 kg (162 lb)   LMP 10/22/2008 (Exact Date)   Breastfeeding No   BMI 28.70 kg/m    Body mass index is 28.7 kg/m .  Physical Exam   GENERAL: healthy, alert and no distress  EYES: Eyes grossly normal to inspection, PERRL and conjunctivae and sclerae normal  MS: no gross musculoskeletal defects noted, slight edema noted to left knee  SKIN: Large  plantar wart, ~4-5 mm in diameter on right forefoot  PSYCH: mentation appears normal, affect normal/bright    Results for orders placed or performed in visit on 12/18/20 (from the past 24 hour(s))   *UA reflex to Microscopic and Culture ([facility] (except [place] and [place])    Specimen: Midstream Urine   Result Value Ref Range    Color Urine Yellow     Appearance Urine Slightly Cloudy     Glucose Urine Negative NEG^Negative mg/dL    Bilirubin Urine Negative NEG^Negative    Ketones Urine Negative NEG^Negative mg/dL    Specific Gravity Urine 1.020 1.003 - 1.035    Blood Urine Moderate (A) NEG^Negative    pH Urine 7.0 5.0 - 7.0 pH    Protein Albumin Urine 30 (A) NEG^Negative mg/dL    Urobilinogen Urine 0.2 0.2 - 1.0 EU/dL    Nitrite Urine Negative NEG^Negative    Leukocyte Esterase Urine Trace (A) NEG^Negative    Source Midstream Urine    Urine Microscopic   Result Value Ref Range    WBC Urine 5-10 (A) OTO5^0 - 5 /HPF    RBC Urine 10-25 (A) OTO2^O - 2 /HPF    Squamous Epithelial /LPF Urine Few FEW^Few /LPF    Bacteria Urine Few (A) NEG^Negative /HPF    Amorphous Crystals Moderate (A) NEG^Negative /HPF           Assessment & Plan     Dysuria  Will order urinalysis with culture as well as wet prep.  No complaints of vaginal discharge, so will likely send in antibiotic for UTI.  No allergies, will do Bactrim DS x 3 days.  Follow up as needed.  - *UA reflex to Microscopic and Culture ([facility] (except [place] and [place])  - Urine Microscopic  - Wet prep    Plantar warts  Cryotherapy done today, verbal consent obtained.  Discussed risks and benefits of procedure, patient opted to proceed.  Liquid nitrogen was applied to the lesion for 5-7 seconds until skin turned white.  Skin was allowed to thaw back to normal color, then process was repeated twice, for a total of three freeze-thaw cycles.  Patient tolerated procedure well.  Band-aid applied.  Patient to continue home cares and  follow up in 3 weeks if treatment needed again.    Pain and swelling of left knee  Minimal swelling noted today, no focal abnormalities.  Recommended conservative cares given no injuries.  Ice, rest, elevation, and compression.  May use a knee sleeve for comfort.  Follow up as needed.      Recommended patient speak with her PCP or her neurologist regarding letter, given this was the first time I have ever seen her.  She was okay with this.        See Patient Instructions    Return in about 1 month (around 1/18/2021) for Preventative Care Visit.    Consuelo Mortensen MD  [facility]

## 2020-12-24 NOTE — PATIENT INSTRUCTIONS
As discussed with you on the telephone call today I have ordered gabapentin to be started for you for your trigeminal neuralgia pain.  Let us know if there is no improvement so that we can increase the dose of gabapentin if needed.    Please inform us if you have any further symptoms of withdrawal after stopping the oxycodone.    Continue your current carbamazepine.    ==============================    Patient Education     Trigeminal Neuralgia    You have trigeminal neuralgia. This is pain caused by irritation of the trigeminal nerve on your face. Symptoms include sudden, sharp pain in your head or face. It may feel like an electric shock. It can last for several seconds or minutes. It usually happens on only 1 side of your face. Pain may be triggered by things like moving your jaw or a touch on the skin of your face. The pain may be caused by something irritating the trigeminal nerve, such as a blood vessel pressing against it. But the exact cause of this problem often isn t known. Although it can be quite painful, the condition isn t dangerous.  Trigeminal neuralgia is often treated with medicines. These include anti-seizure medicines or antidepressants. Certain other treatments may also help. In some cases, you may need surgery.  Home care  Your healthcare provider may prescribe medicines to help relieve and prevent pain. Take all medicines as directed. Please note that it may take several changes in dose and medicines before the right combination is found that controls the pain.  General care:    Plan to rest at home today.    Avoid any specific activities that seem to trigger the pain.    Over the next few weeks, keep a pain diary. Write down when your symptoms happen and how they feel. Certain activities such as touching your face, chewing, talking, or brushing your teeth may bring on the pain. Cold air can also trigger the pain. Make sure you write down any triggers and discuss these with your healthcare  provider. This will help guide treatment.  Follow-up care  Follow up with your healthcare provider, or as advised. If you were referred to a neurologist, be sure to make an appointment.  For more information on your condition, visit:    Facial Pain Association www.fpa-support.org  When to seek medical advice  Call your healthcare provider right away if any of these occur:    Fever of 100.4 F (38 C) or higher, or as advised    Headache with very stiff neck    You aren t able to keep liquids down (repeated vomiting)    Extreme drowsiness or confusion    Dizziness or fainting    A new feeling of weakness or numbness or tingling in your arm, leg, or face    Difficulty speaking or seeing  Date Last Reviewed: 8/1/2016 2000-2019 [employer] [facility] [employer] [employer]. [address] [place] [address], [place] [address]. All rights reserved. This information is not intended as a substitute for professional medical care. Always follow your healthcare professional's instructions.                No

## 2020-12-30 ENCOUNTER — TELEPHONE (OUTPATIENT)
Dept: FAMILY MEDICINE | Facility: CLINIC | Age: 65
End: 2020-12-30

## 2020-12-30 NOTE — LETTER
December 31, 2020      Nu Taylor  [address] [address] [place] [id number]        To Whom It May Concern,         Ms. Taylor in an anti-seizure medication for a neurological pain condition, not for seizures.  She has no history of seizures.      Sincerely,        Samina Bone PA-C

## 2020-12-31 NOTE — TELEPHONE ENCOUNTER
Called patient she requested for the letter to be emailed to [email or web address]. Letter printed and emailed.  Lavinia Puga

## 2021-02-01 DIAGNOSIS — G50.0 TRIGEMINAL NEURALGIA: ICD-10-CM

## 2021-02-01 RX ORDER — CARBAMAZEPINE 200 MG/1
200 TABLET ORAL 2 TIMES DAILY
Qty: 180 TABLET | Refills: 1 | Status: SHIPPED | OUTPATIENT
Start: 2021-02-01 | End: 2021-07-23

## 2021-02-10 ENCOUNTER — ANCILLARY PROCEDURE (OUTPATIENT)
Dept: GENERAL RADIOLOGY | Facility: CLINIC | Age: 66
End: 2021-02-10
Attending: PHYSICIAN ASSISTANT
Payer: OTHER GOVERNMENT

## 2021-02-10 ENCOUNTER — OFFICE VISIT (OUTPATIENT)
Dept: FAMILY MEDICINE | Facility: CLINIC | Age: 66
End: 2021-02-10
Payer: OTHER GOVERNMENT

## 2021-02-10 VITALS
SYSTOLIC BLOOD PRESSURE: 122 MMHG | HEIGHT: 63 IN | OXYGEN SATURATION: 98 % | HEART RATE: 79 BPM | WEIGHT: 162.7 LBS | TEMPERATURE: 98.7 F | RESPIRATION RATE: 19 BRPM | DIASTOLIC BLOOD PRESSURE: 80 MMHG | BODY MASS INDEX: 28.83 KG/M2

## 2021-02-10 DIAGNOSIS — S89.92XA INJURY OF LEFT KNEE, INITIAL ENCOUNTER: ICD-10-CM

## 2021-02-10 DIAGNOSIS — G50.0 TRIGEMINAL NEURALGIA: ICD-10-CM

## 2021-02-10 DIAGNOSIS — B07.0 PLANTAR WARTS: ICD-10-CM

## 2021-02-10 DIAGNOSIS — S89.92XA INJURY OF LEFT KNEE, INITIAL ENCOUNTER: Primary | ICD-10-CM

## 2021-02-10 PROCEDURE — 17110 DESTRUCTION B9 LES UP TO 14: CPT | Performed by: PHYSICIAN ASSISTANT

## 2021-02-10 PROCEDURE — 73562 X-RAY EXAM OF KNEE 3: CPT | Mod: LT | Performed by: RADIOLOGY

## 2021-02-10 PROCEDURE — 99214 OFFICE O/P EST MOD 30 MIN: CPT | Mod: 25 | Performed by: PHYSICIAN ASSISTANT

## 2021-02-10 RX ORDER — GABAPENTIN 100 MG/1
100 CAPSULE ORAL 3 TIMES DAILY
Qty: 180 CAPSULE | Refills: 1 | Status: SHIPPED | OUTPATIENT
Start: 2021-02-10 | End: 2021-08-03

## 2021-02-10 RX ORDER — NAPROXEN 500 MG/1
500 TABLET ORAL 2 TIMES DAILY WITH MEALS
Qty: 28 TABLET | Refills: 0 | Status: SHIPPED | OUTPATIENT
Start: 2021-02-10 | End: 2021-02-24

## 2021-02-10 ASSESSMENT — MIFFLIN-ST. JEOR: SCORE: 1257.13

## 2021-02-10 NOTE — PROGRESS NOTES
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  Increased gabapentin 100 mg  from BID to TID  Please follow-up if symptoms fail to resolve or worsen    - gabapentin (NEURONTIN) 100 MG capsule; Take 1 capsule (100 mg) by mouth 3 times daily    Injury of left knee, initial encounter  Knee xray is normal.   Advised continued elevating and icing naprosyn 500 mg bid x 14 days.  If still painful will refer to sports med.   - naproxen (NAPROSYN) 500 MG tablet; Take 1 tablet (500 mg) by mouth 2 times daily (with meals) for 14 days    Plantar warts  Apply liquid nitrogen in pulsing manner x 3                No follow-ups on file.    Ramona Ann Aaseby-Aguilera, PA-C [facility]    Enrique Judge is a 65 year old who presents for the following health issues     History of Present Illness       She eats 2-3 servings of fruits and vegetables daily.She consumes 0 sweetened beverage(s) daily.She exercises with enough effort to increase her heart rate 20 to 29 minutes per day.  She exercises with enough effort to increase her heart rate 3 or less days per week.   She is taking medications regularly.     Concern - trigeminal neuralgia  Onset: acting up again     Description:   Right side of face - painful     Intensity: moderate when talking and when out in the cold       Progression of Symptoms:  Worsening the last two weeks     Accompanying Signs & Symptoms:  Painful right side of face    Previous history of similar problem:   2019 came back again    Precipitating factors:   Worsened by: cold      Alleviating factors:  Improved by:     Therapies Tried and outcome:      Musculoskeletal problem/pain  Onset/Duration:  A month ago   Description  Location: knee - left  Joint Swelling: YES - when sitting a lot   Redness: no  Pain: YES  Warmth: no  Intensity:  Still swelling after a month - still has pain   Progression of Symptoms:  Improving a  "little   Accompanying signs and symptoms:   Fevers: no  Numbness/tingling/weakness: no  History  Trauma to the area: YES - slipped on the ice   Recent illness:  no  Previous similar problem: no  Previous evaluation:  no  Precipitating or alleviating factors:  Aggravating factors include: sitting  Therapies tried and outcome: ice - helps with the swelling     Warts  Onset/Duration: on going   Description (location/number): right foot   Accompanying signs and symptoms (pain, redness): no  History: prior warts: YES  Therapies tried and outcome: liquid nitrogen            Review of Systems   Constitutional, HEENT, cardiovascular, pulmonary, gi and gu systems are negative, except as otherwise noted.      Objective    /80 (BP Location: Right arm, Patient Position: Chair, Cuff Size: Adult Large)   Pulse 79   Temp 98.7  F (37.1  C) (Oral)   Resp 19   Ht 1.6 m (5' 3\")   Wt 73.8 kg (162 lb 11.2 oz)   LMP 10/22/2008 (Exact Date)   SpO2 98%   BMI 28.82 kg/m    Body mass index is 28.82 kg/m .  Physical Exam   GENERAL: healthy, alert and no distress  HENT: ear canals and TM's normal, nose and mouth without ulcers or lesions  RESP: lungs clear to auscultation - no rales, rhonchi or wheezes  CV: regular rate and rhythm, normal S1 S2, no S3 or S4, no murmur, click or rub, no peripheral edema and peripheral pulses strong  MS: LLE exam shows mod swelling on lateral above knee and also medial below joint line.  No TTP over joint line. ROM of all joints is normal, no evidence of joint instability   Skin: RIGHT FOOT 5 MM WART               "

## 2021-02-10 NOTE — NURSING NOTE
Future Appointments   Date Time Provider Department Center   2/10/2021  2:30 PM Aaseby-Aguilera, Ramona Ann, PA-C [facility]     Appointment Notes for this encounter:   talk about medication, sore knee, and plantar wart    There are no preventive care reminders to display for this patient.  Health Maintenance addressed:  NONE        MyChart Status:  Active and Using

## 2021-02-10 NOTE — PATIENT INSTRUCTIONS
(S89.92XA) Injury of left knee, initial encounter  (primary encounter diagnosis)  Comment: please take naprosyn 1 tablet twice daily   Plan: naproxen (NAPROSYN) 500 MG tablet, CANCELED: XR        Knee Standing Left 2 Views            (G50.0) Trigeminal neuralgia  Comment: increased to 1 tablet 3 times daily.  May help with sleep.   Plan: gabapentin (NEURONTIN) 100 MG capsule

## 2021-03-11 ENCOUNTER — HOSPITAL ENCOUNTER (EMERGENCY)
Facility: CLINIC | Age: 66
End: 2021-03-11
Payer: OTHER GOVERNMENT

## 2021-03-23 ENCOUNTER — NURSE TRIAGE (OUTPATIENT)
Dept: FAMILY MEDICINE | Facility: CLINIC | Age: 66
End: 2021-03-23

## 2021-03-23 NOTE — TELEPHONE ENCOUNTER
"Not in pain    Last night going to the bathroom, felt like a bubble protruding from her vagina. No pain but stomach feels like it has not eaten or feels like she is about start her menstrual cycle.  It went back in after bearing down.     Patient has been a bit of constipation recently. Patient has been working on her diet to help.     BM every other day. Stool is firm and strain to pass the BM. Increasing fiber in the diet, 2 cups of coffee in the morning and 3 glasses of water.     Discussed with patient working on her constipation by increasing the amount of water she drinks a day, increasing fiber constipation, adding a dose of miralax a day.       Reason for Disposition    Feels like something inside is falling out of vagina (e.g., pressure, heaviness, fullness)    Answer Assessment - Initial Assessment Questions  1. SYMPTOM: \"What's the main symptom you're concerned about?\" (e.g., pain, itching, dryness)      Something protruding from the vagina  2. LOCATION: \"Where is the  protrusion  located?\" (e.g., inside/outside, left/right)      At vaginal entrance   3. ONSET: \"When did the  Protrusion  start?\"      Last week  4. PAIN: \"Is there any pain?\" If so, ask: \"How bad is it?\" (Scale: 1-10; mild, moderate, severe)      No pain  5. ITCHING: \"Is there any itching?\" If so, ask: \"How bad is it?\" (Scale: 1-10; mild, moderate, severe)      No itching  6. CAUSE: \"What do you think is causing the discharge?\" \"Have you had the same problem before? What happened then?\"      Possible uterine prolaps  7. OTHER SYMPTOMS: \"Do you have any other symptoms?\" (e.g., fever, itching, vaginal bleeding, pain with urination, injury to genital area, vaginal foreign body)      Constipation, and abdominal bloating/cramping  8. PREGNANCY: \"Is there any chance you are pregnant?\" \"When was your last menstrual period?\"      n/a    Protocols used: VAGINAL SYMPTOMS-A-    Jolie Beasley RN on 3/23/2021 at 8:31 AM    "

## 2021-03-26 ENCOUNTER — OFFICE VISIT (OUTPATIENT)
Dept: FAMILY MEDICINE | Facility: CLINIC | Age: 66
End: 2021-03-26
Payer: OTHER GOVERNMENT

## 2021-03-26 VITALS
BODY MASS INDEX: 28.7 KG/M2 | HEART RATE: 95 BPM | OXYGEN SATURATION: 99 % | SYSTOLIC BLOOD PRESSURE: 118 MMHG | HEIGHT: 63 IN | RESPIRATION RATE: 16 BRPM | TEMPERATURE: 99.2 F | WEIGHT: 162 LBS | DIASTOLIC BLOOD PRESSURE: 70 MMHG

## 2021-03-26 DIAGNOSIS — B07.0 PLANTAR WART: ICD-10-CM

## 2021-03-26 DIAGNOSIS — K59.00 CONSTIPATION, UNSPECIFIED CONSTIPATION TYPE: Primary | ICD-10-CM

## 2021-03-26 DIAGNOSIS — N81.6 RECTOCELE: ICD-10-CM

## 2021-03-26 PROCEDURE — 99214 OFFICE O/P EST MOD 30 MIN: CPT | Mod: 25 | Performed by: PHYSICIAN ASSISTANT

## 2021-03-26 PROCEDURE — 17110 DESTRUCTION B9 LES UP TO 14: CPT | Performed by: PHYSICIAN ASSISTANT

## 2021-03-26 RX ORDER — SIMVASTATIN 40 MG
TABLET ORAL
Qty: 90 TABLET | Refills: 3 | Status: CANCELLED | OUTPATIENT
Start: 2021-03-26

## 2021-03-26 ASSESSMENT — MIFFLIN-ST. JEOR: SCORE: 1253.96

## 2021-03-26 NOTE — PROGRESS NOTES
"    Assessment & Plan     Constipation, unspecified constipation type  Add fiber supplement    Rectocele  Follow up with urology on repair options  - UROLOGY ADULT REFERRAL; Future    Plantar wart  Can repeat freezing in 3 weeks if not resolved.  Lesions were pared down using a #11 sterile blade.  Liquid nitrogen was applied for 10 seconds x3  to the skin lesions. The expected blistering/scabbing reaction was explained. Patient is not to pick at the areas. Patient reminded to expect hypopigmented scars from the procedure. Return in 2-3 weeks for repeat treatment if lesions fail to fully resolve.  At home treatment discussed in patient instructions.    - DESTRUCT BENIGN LESION, UP TO 14             BMI:   Estimated body mass index is 28.7 kg/m  as calculated from the following:    Height as of this encounter: 1.6 m (5' 3\").    Weight as of this encounter: 73.5 kg (162 lb).           Return in about 2 weeks (around 4/9/2021) for Specialist Appt as referred.    Samina Bone PA-C  [facility]    Enrique Judge is a 65 year old who presents for the following health issues     HPI     Vaginal Symptoms-- constipation  Onset/Duration: x 2-3 days  Description:  Vaginal Discharge: none   Itching (Pruritis): no  Burning sensation:  no  Odor: no  Accompanying Signs & Symptoms:  Urinary symptoms: no  Abdominal pain: more discomfort, low abdomen pain-discomfort- constipation  Fever: no  History:   Sexually active: no  New Partner: no  Possibility of Pregnancy:  no  Recent antibiotic use: no  Previous vaginitis issues: no  Precipitating or alleviating factors: feels like she is sitting on something  Therapies tried and outcome: none, not but trying to clean better  Takes stool softener when needs it 1-2 times per week.  Bulge came out when trying to have a bowel movement.  Went back in after having bowel movement.      Additional complaints: Wart  HPI additional notes: Nu presents " "today with   Chief Complaint   Patient presents with     Vaginal Problem            Review of Systems   Constitutional, HEENT, cardiovascular, pulmonary, gi and gu systems are negative, except as otherwise noted.      Objective    /70 (BP Location: Right arm, Patient Position: Chair, Cuff Size: Adult Regular)   Pulse 95   Temp 99.2  F (37.3  C) (Oral)   Resp 16   Ht 1.6 m (5' 3\")   Wt 73.5 kg (162 lb)   LMP 10/22/2008 (Exact Date)   SpO2 99%   Breastfeeding No   BMI 28.70 kg/m    Body mass index is 28.7 kg/m .  Physical Exam     Physical Exam   GENERAL: healthy, alert, in no acute distress  ABDOMEN: soft, nontender, no hepatosplenomegaly or masses. Normal bowel sounds in all four quadrants.  SKIN:4 mm verruca with exposed capillaries on sole of right foot  - female: rectocele present  PSYCH: Alert and oriented times 3;  Able to articulate logical thoughts. Affect is normal.       "

## 2021-04-23 ENCOUNTER — OFFICE VISIT (OUTPATIENT)
Dept: OBGYN | Facility: CLINIC | Age: 66
End: 2021-04-23
Payer: OTHER GOVERNMENT

## 2021-04-23 VITALS — SYSTOLIC BLOOD PRESSURE: 114 MMHG | BODY MASS INDEX: 28.34 KG/M2 | WEIGHT: 160 LBS | DIASTOLIC BLOOD PRESSURE: 68 MMHG

## 2021-04-23 DIAGNOSIS — R35.0 URINARY FREQUENCY: Primary | ICD-10-CM

## 2021-04-23 DIAGNOSIS — N39.41 URGE INCONTINENCE OF URINE: ICD-10-CM

## 2021-04-23 DIAGNOSIS — N81.11 CYSTOCELE, MIDLINE: ICD-10-CM

## 2021-04-23 LAB
ALBUMIN UR-MCNC: NEGATIVE MG/DL
APPEARANCE UR: CLEAR
BILIRUB UR QL STRIP: NEGATIVE
COLOR UR AUTO: YELLOW
GLUCOSE UR STRIP-MCNC: NEGATIVE MG/DL
HGB UR QL STRIP: NEGATIVE
KETONES UR STRIP-MCNC: NEGATIVE MG/DL
LEUKOCYTE ESTERASE UR QL STRIP: NEGATIVE
NITRATE UR QL: NEGATIVE
PH UR STRIP: 7.5 PH (ref 5–7)
SOURCE: ABNORMAL
SP GR UR STRIP: 1.02 (ref 1–1.03)
UROBILINOGEN UR STRIP-ACNC: 0.2 EU/DL (ref 0.2–1)

## 2021-04-23 PROCEDURE — 81003 URINALYSIS AUTO W/O SCOPE: CPT | Performed by: OBSTETRICS & GYNECOLOGY

## 2021-04-23 PROCEDURE — 99205 OFFICE O/P NEW HI 60 MIN: CPT | Performed by: OBSTETRICS & GYNECOLOGY

## 2021-04-23 NOTE — Clinical Note
Edmund Mccarthy M.D. [address] [address] [phone] phone  [phone] fax       Dear Samina,         Thank you for the opportunity to see your patient. Please see my office visit notes for your review.  As always, I appreciate the opportunity to participate in your patient's care.     Sincerely yours,    Edmund Mccarthy M.D.

## 2021-04-23 NOTE — PATIENT INSTRUCTIONS
You can reach your [facility] Care Team any time of the day by calling [phone]. This number will put you in touch with the 24 hour nurse line if the clinic is closed.    To contact your OB/GYN Station Coordinator/Surgery Scheduler please call [phone]. This is a direct number for your care team between 8 a.m. and 4 p.m. Monday through Friday.    [facility] is open for your convenience:  Monday through Friday 8 a.m. to 6 p.m.  Closed weekends and all major holidays.

## 2021-04-23 NOTE — NURSING NOTE
"Chief Complaint   Patient presents with     Bladder Problems       Initial /68   Wt 72.6 kg (160 lb)   LMP 10/22/2008 (Exact Date)   BMI 28.34 kg/m   Estimated body mass index is 28.34 kg/m  as calculated from the following:    Height as of 3/26/21: 1.6 m (5' 3\").    Weight as of this encounter: 72.6 kg (160 lb).  BP completed using cuff size: regular    Questioned patient about current smoking habits.  Pt. has never smoked.          The following HM Due: NONE      The following patient reported/Care Every where data was sent to:  P ABSTRACT QUALITY INITIATIVES [[id number]]        [facility]                 "

## 2021-04-24 PROBLEM — N81.11 CYSTOCELE, MIDLINE: Status: ACTIVE | Noted: 2021-04-24

## 2021-04-24 PROBLEM — N39.41 URGE INCONTINENCE OF URINE: Status: ACTIVE | Noted: 2021-04-24

## 2021-04-24 NOTE — PROGRESS NOTES
The patient is a. 65 year old  white female  the largest 9 pounds 2 ounces, this was her first, she states this was a hard delivery and was forceps assisted, the rest were vaginal deliveries with one  delivery that did not survive, tubal sterilization, last menstrual period approximately age 49 for contraception, not on HRT with no vaginal bleeding since whom I am asked to see by RADHA Maldonado  for evaluation of a symptomatic midline cystocele that the patient states she first noticed approximately 2 weeks ago.  The patient has pelvic floor pressure and incomplete emptying but no other bladder symptoms.  Patient states that she has a history of constipation and oftentimes uses a finger vaginally to aid in expelling of stool.  The patient underwent a SPARC procedure by Dr. Molina in  and states that it worked well for the first 2 years.  After that time she had trouble with urgency and frequency is been treated with Sanctura oxybutynin and Vesicare.  The patient denies any leakage of urine with laughing lifting coughing or straining but says the urgency and frequency are bothersome to her.  Patient does wear protection.  Pregnancy history includes forcep assisted vaginal delivery with her first followed by vaginal deliveries,, no other known history of bladder or bowel injury. Patient complains of urinary leakage with urgency frequency but no symptoms directly attributable to stress incontinence.     Asthma: Denies  Smoking: Denies  Recurrent UTIs: States she has about 1 bladder infection a year, none recently  Hematuria: Denies  Diabetes: Denies  Related medication usage:   Current Outpatient Medications   Medication     amLODIPine-benazepril (LOTREL) 10-40 MG capsule     carBAMazepine (TEGRETOL) 200 MG tablet     gabapentin (NEURONTIN) 100 MG capsule     Multiple Vitamins-Minerals (CENTRUM PO)     simvastatin (ZOCOR) 20 MG tablet     No current facility-administered medications for this  visit.      She uses Neurontin for trigeminal neuralgia  Incontinence of stool or flatus: Denies, states she has a history of constipation  Symptoms of pelvic relaxation/prolapse: As above  Voiding or defacatory dysfunction: Splints to defecate but not to void, repositions to void does notice a sensation of incomplete emptying  Previous evaluation: As above  Kegel exercises: Did not help  Nocturia: Denies  Fluids: She had been avoiding bladder irritants in the past but went back to using them again and notices that her bladder symptoms have gotten worse.  Has 2 cups of coffee in the morning, 7 to 8 glasses of water a day and a glass of wine with dinner in the summer and on the weekend  Urgency: Yes with infrequent leakage    Past Medical History:   Diagnosis Date     Allergic rhinitis due to other allergen      HTN (hypertension)      Hyperlipidemia LDL goal <130 2008     Past Surgical History:   Procedure Laterality Date     C LIGATION,FALLOPIAN TUBE W/       LAPAROSCOPIC SUSPENSION BLADDER NECK       Family History   Problem Relation Age of Onset     No Known Problems Mother      No Known Problems Father      Leukemia Maternal Grandmother      Diabetes Son         type 1     Family History Negative No family hx of      C.A.D. No family hx of      Cancer - colorectal No family hx of      Social History     Socioeconomic History     Marital status:      Spouse name: Thomas     Number of children: 4     Years of education: Not on file     Highest education level: Not on file   Occupational History     Occupation: bus      Employer: [employer]      Comment: [employer]   Social Needs     Financial resource strain: Not on file     Food insecurity     Worry: Not on file     Inability: Not on file     Transportation needs     Medical: Not on file     Non-medical: Not on file   Tobacco Use     Smoking status: Never Smoker     Smokeless tobacco: Never Used   Substance and Sexual  Activity     Alcohol use: Yes     Alcohol/week: 0.0 standard drinks     Comment: occ     Drug use: No     Sexual activity: Not Currently     Partners: Male   Lifestyle     Physical activity     Days per week: Not on file     Minutes per session: Not on file     Stress: Not on file   Relationships     Social connections     Talks on phone: Not on file     Gets together: Not on file     Attends Christianity service: Not on file     Active member of club or organization: Not on file     Attends meetings of clubs or organizations: Not on file     Relationship status: Not on file     Intimate partner violence     Fear of current or ex partner: Not on file     Emotionally abused: Not on file     Physically abused: Not on file     Forced sexual activity: Not on file   Other Topics Concern     Parent/sibling w/ CABG, MI or angioplasty before 65F 55M? No      Service No     Blood Transfusions No     Caffeine Concern No     Occupational Exposure No     Hobby Hazards No     Sleep Concern Yes     Stress Concern Yes     Weight Concern No     Special Diet Yes     Comment: low sodium     Back Care No     Exercise Yes     Comment: 2 x per week     Bike Helmet Yes     Seat Belt Yes     Self-Exams Yes   Social History Narrative     Not on file     Vitals: /68   Wt 72.6 kg (160 lb)   LMP 10/22/2008 (Exact Date)   BMI 28.34 kg/m    BMI= Body mass index is 28.34 kg/m .    Constitutional: healthy, alert and no distress  Head: Normocephalic. No masses, lesions, tenderness or abnormalities  Neck: Neck supple. No adenopathy. Thyroid symmetric, normal size,, Carotids without bruits.  Cardiovascular: negative, PMI normal. No lifts, heaves, or thrills. RRR. No murmurs, clicks gallops or rub  Respiratory: negative, Percussion normal. Good diaphragmatic excursion. Lungs clear  Gastrointestinal: Abdomen soft, non-tender. BS normal. No masses, organomegaly  Genitourinary: Normal external genitalia without lesions and grade 2-3  midline cystocele, greater than 30 degrees of UV angle hypermobility.  Patient voided 50 cc with minimal postvoid residual, no leakage with coughing or straining.  Speculum exam multipara cervix grade 1 cervical descent no lesions seen, bimanual exam the uterus is parous mobile restoration of apical support does not completely reduce the cystocele, adnexa without masses enlargement.  Rectovaginal exam acceptable sphincter tone distal grade 1 rectocele no other masses  Musculoskeletalextremities normal- no gross deformities noted, gait normal and normal muscle tone  Integument: no suspicious lesions or rashes  Neurologic: Gait normal. Reflexes normal and symmetric. Sensation grossly WNL.    (R35.0) Urinary frequency  (primary encounter diagnosis)  Comment: Some of her frequency by history may be related to the use of methylxanthine derivatives and alcohol.  I had a lengthy discussion with her regarding her urgency frequency and we discussed alternatives and fluid management with timed voidings.  By history she may also have some incomplete emptying with her midline cystocele  Plan: UA reflex to Microscopic and Culture        I will send the urine for completeness    (N81.11) Cystocele, midline  Comment: I reviewed at length the pathophysiology of pelvic floor relaxation and cystocele and gave the patient a detailed written outline and patient education information.  The patient states that she plans on retiring would like to have something done immediately.  She is planning on having [employer] insurance with 100% coverage after her long-term.  She will be 66 in August of this year.  Plan: We discussed the use of a pessary to give her temporary relief.  We discussed surgical options at length as well as expectant management.  Would consider a urodynamic study prior to surgical intervention.  We also discussed advanced therapies for treatment of her urgency frequency and I gave her a written outline of this    (N39.41)  Urge incontinence of urine  Comment: As above  Plan: 60 minutes spent reviewing previous provider office notes laboratory studies tests medications and compiling records assessment and plan      Edmund Mccarthy M.D.

## 2021-05-10 ENCOUNTER — OFFICE VISIT (OUTPATIENT)
Dept: OBGYN | Facility: CLINIC | Age: 66
End: 2021-05-10
Payer: OTHER GOVERNMENT

## 2021-05-10 VITALS — WEIGHT: 161 LBS | BODY MASS INDEX: 28.52 KG/M2 | SYSTOLIC BLOOD PRESSURE: 120 MMHG | DIASTOLIC BLOOD PRESSURE: 80 MMHG

## 2021-05-10 DIAGNOSIS — R35.0 URINARY FREQUENCY: Primary | ICD-10-CM

## 2021-05-10 DIAGNOSIS — N81.11 CYSTOCELE, MIDLINE: ICD-10-CM

## 2021-05-10 LAB
ALBUMIN UR-MCNC: NEGATIVE MG/DL
APPEARANCE UR: CLEAR
BILIRUB UR QL STRIP: NEGATIVE
COLOR UR AUTO: YELLOW
GLUCOSE UR STRIP-MCNC: NEGATIVE MG/DL
HGB UR QL STRIP: NEGATIVE
KETONES UR STRIP-MCNC: NEGATIVE MG/DL
LEUKOCYTE ESTERASE UR QL STRIP: NEGATIVE
NITRATE UR QL: NEGATIVE
PH UR STRIP: 8 PH (ref 5–7)
SOURCE: ABNORMAL
SP GR UR STRIP: 1.02 (ref 1–1.03)
UROBILINOGEN UR STRIP-ACNC: 0.2 EU/DL (ref 0.2–1)

## 2021-05-10 PROCEDURE — A4562 PESSARY, NON RUBBER,ANY TYPE: HCPCS | Performed by: OBSTETRICS & GYNECOLOGY

## 2021-05-10 PROCEDURE — 81003 URINALYSIS AUTO W/O SCOPE: CPT | Performed by: OBSTETRICS & GYNECOLOGY

## 2021-05-10 PROCEDURE — 57160 INSERT PESSARY/OTHER DEVICE: CPT | Performed by: OBSTETRICS & GYNECOLOGY

## 2021-05-10 PROCEDURE — 99213 OFFICE O/P EST LOW 20 MIN: CPT | Mod: 25 | Performed by: OBSTETRICS & GYNECOLOGY

## 2021-05-10 NOTE — PROGRESS NOTES
The patient is a. 65 year old  white female  the largest 9 pounds 2 ounces, this was her first, she states this was a hard delivery and was forceps assisted, the rest were vaginal deliveries with one  delivery that did not survive, tubal sterilization, last menstrual period approximately age 49 for contraception, not on HRT with no vaginal bleeding since whom I am asked to see by RADHA Maldonado  for evaluation of a symptomatic midline cystocele that the patient states she first noticed approximately 3 weeks ago.  The patient has pelvic floor pressure and incomplete emptying but no other bladder symptoms.  Patient states that she has a history of constipation and oftentimes uses a finger vaginally to aid in expelling of stool.  The patient underwent a SPARC procedure by Dr. Molina in  and states that it worked well for the first 2 years.  After that time she had trouble with urgency and frequency is been treated with Sanctura oxybutynin and Vesicare.  The patient denies any leakage of urine with laughing lifting coughing or straining but says the urgency and frequency are bothersome to her.  Patient does wear protection.  Pregnancy history includes forcep assisted vaginal delivery with her first followed by vaginal deliveries,, no other known history of bladder or bowel injury. Patient complains of urinary leakage with urgency frequency but no symptoms directly attributable to stress incontinence.  At the time of her initial evaluation on 2021 the patient had a grade 2-3 midline cystocele, greater than 30 degrees of UV angle hypermobility and grade 1 cervical descent.  In reviewing with her the findings the risks and benefits the alternatives the patient would like to try a pessary.  She presents today for a pessary fitting.  Risks, benefits, and alternative modes of therapy discussed at length. Pathophysiology of the disease process reviewed, all of the patients questions answered  and informed consent obtained.    Past Medical History:   Diagnosis Date     Allergic rhinitis due to other allergen      HTN (hypertension)      Hyperlipidemia LDL goal <130 8/6/2008     Current Outpatient Medications   Medication     amLODIPine-benazepril (LOTREL) 10-40 MG capsule     carBAMazepine (TEGRETOL) 200 MG tablet     gabapentin (NEURONTIN) 100 MG capsule     Multiple Vitamins-Minerals (CENTRUM PO)     simvastatin (ZOCOR) 20 MG tablet     No current facility-administered medications for this visit.      /80   Wt 73 kg (161 lb)   LMP 10/22/2008 (Exact Date)   BMI 28.52 kg/m    Constitutional: healthy, alert and no distress  Genitourinary: Normal external genitalia without lesions and speculum exam the grade 2-3 midline cystocele is unchanged.  Grade 1 cervical descent is unchanged.  Bimanual exam the uterus is parous mobile and restoration of apical support does not completely reduce her cystocele.  After obtaining informed consent the patient was fitted with a #3 diaphragm type pessary which resulted in good reduction of her cystocele.  The patient demonstrated proficiency with insertion and removal of the pessary    (R35.0) Urinary frequency  (primary encounter diagnosis)  Comment: I will send a urine specimen today to rule out infection  Plan: UA reflex to Microscopic and Culture        Plan reviewed with the patient    (N81.11) Cystocele, midline  Comment: In discussing with the patient the findings of the alternative forms of therapy she would like to try a pessary.  She was fitted with a #3 diaphragm pessary with good resulting support and demonstrated proficiency with insertion and removal.  The patient will use the Trimo-Hameed gel and insert the pessary in the morning remove it at bedtime cleaning with soap and water and then reinserted the morning.  I will see her back in several days to assess.  If she has any concerns in the interval she will call  Plan: As above

## 2021-05-10 NOTE — PATIENT INSTRUCTIONS
You can reach your [facility] Care Team any time of the day by calling [phone]. This number will put you in touch with the 24 hour nurse line if the clinic is closed.    To contact your OB/GYN Station Coordinator/Surgery Scheduler please call [phone]. This is a direct number for your care team between 8 a.m. and 4 p.m. Monday through Friday.    [facility] is open for your convenience:  Monday through Friday 8 a.m. to 6 p.m.  Closed weekends and all major holidays.

## 2021-05-10 NOTE — NURSING NOTE
"Chief Complaint   Patient presents with     Pessary Check/Fit/Insert       Initial /80   Wt 73 kg (161 lb)   LMP 10/22/2008 (Exact Date)   BMI 28.52 kg/m   Estimated body mass index is 28.52 kg/m  as calculated from the following:    Height as of 3/26/21: 1.6 m (5' 3\").    Weight as of this encounter: 73 kg (161 lb).  BP completed using cuff size: regular    Questioned patient about current smoking habits.  Pt. has never smoked.          The following HM Due: NONE      The following patient reported/Care Every where data was sent to:  P ABSTRACT QUALITY INITIATIVES [[id number]]        [facility]                 "

## 2021-06-01 DIAGNOSIS — E78.5 HYPERLIPIDEMIA LDL GOAL <130: ICD-10-CM

## 2021-06-01 DIAGNOSIS — I10 HTN, GOAL BELOW 140/90: ICD-10-CM

## 2021-06-01 RX ORDER — AMLODIPINE AND BENAZEPRIL HYDROCHLORIDE 10; 40 MG/1; MG/1
CAPSULE ORAL
Qty: 90 CAPSULE | Refills: 0 | Status: SHIPPED | OUTPATIENT
Start: 2021-06-01 | End: 2021-08-30

## 2021-06-01 RX ORDER — SIMVASTATIN 20 MG
20 TABLET ORAL AT BEDTIME
Qty: 90 TABLET | Refills: 0 | Status: SHIPPED | OUTPATIENT
Start: 2021-06-01 | End: 2021-08-30

## 2021-06-01 NOTE — TELEPHONE ENCOUNTER
Routing refill request to provider for review/approval because:  Drug interaction warning  Kimberly Valenzuela RN, BSN

## 2021-06-15 ENCOUNTER — OFFICE VISIT (OUTPATIENT)
Dept: FAMILY MEDICINE | Facility: CLINIC | Age: 66
End: 2021-06-15
Payer: OTHER GOVERNMENT

## 2021-06-15 VITALS
TEMPERATURE: 97.9 F | BODY MASS INDEX: 28.2 KG/M2 | OXYGEN SATURATION: 99 % | SYSTOLIC BLOOD PRESSURE: 120 MMHG | DIASTOLIC BLOOD PRESSURE: 82 MMHG | HEART RATE: 80 BPM | WEIGHT: 159.2 LBS | RESPIRATION RATE: 16 BRPM

## 2021-06-15 DIAGNOSIS — B07.0 PLANTAR WART: ICD-10-CM

## 2021-06-15 DIAGNOSIS — H61.21 IMPACTED CERUMEN OF RIGHT EAR: Primary | ICD-10-CM

## 2021-06-15 PROCEDURE — 69210 REMOVE IMPACTED EAR WAX UNI: CPT | Mod: RT | Performed by: PHYSICIAN ASSISTANT

## 2021-06-15 PROCEDURE — 69210 REMOVE IMPACTED EAR WAX UNI: CPT | Mod: LT | Performed by: PHYSICIAN ASSISTANT

## 2021-06-15 PROCEDURE — 99207 PR NO BILLABLE SERVICE THIS VISIT: CPT | Performed by: PHYSICIAN ASSISTANT

## 2021-06-15 PROCEDURE — 17110 DESTRUCTION B9 LES UP TO 14: CPT | Performed by: PHYSICIAN ASSISTANT

## 2021-06-15 NOTE — PROGRESS NOTES
"    Assessment & Plan     Impacted cerumen of right ear      Plantar wart                 BMI:   Estimated body mass index is 28.2 kg/m  as calculated from the following:    Height as of 3/26/21: 1.6 m (5' 3\").    Weight as of this encounter: 72.2 kg (159 lb 3.2 oz).           Return in about 3 weeks (around 7/6/2021) for refreezing if needed.    Samina Bone PA-C  [facility]    Lesions were pared down using a #11 sterile blade.  Liquid nitrogen was applied for 10 seconds x3  to the skin lesions. The expected blistering/scabbing reaction was explained. Patient is not to pick at the areas. Patient reminded to expect hypopigmented scars from the procedure. Return in 2-3 weeks for repeat treatment if lesions fail to fully resolve.  At home treatment discussed in patient instructions.    Cerumen is noted via otoscopic examination in the bilateral external ear causing severe obstruction with impaction.  Removal deemed medically necessary due loss of hearing secondary to obstruction.  Cerumen was removed by provider with syringing and manual debridement with wax curette. Instructions for home care to prevent wax buildup are given. Pt tolerated procedure well without complication.      Enrique Judge is a 65 year old who presents for the following health issues  accompanied by her son:    HPI     Concern - right ear plugged  Onset: couple days  Description: plugged, sounds are muffled  Intensity: moderate  Progression of Symptoms:  same  Accompanying Signs & Symptoms: pain around ear  Previous history of similar problem: HX of ear wax build up  Precipitating factors:        Worsened by: na  Alleviating factors:        Improved by: uses debrox 1 x month  Therapies tried and outcome:  none     Warts  Onset/Duration: ongoing  Description (location/number): bottom of foot  Accompanying signs and symptoms (pain, redness): no  History: prior warts: YES- has had wart frozen a few " times  Therapies tried and outcome: liquid nitrogen    Additional complaints: None  HPI additional notes: Nu presents today with   Chief Complaint   Patient presents with     Ear Problem     Wart            Review of Systems   Constitutional, HEENT, cardiovascular, pulmonary, gi and gu systems are negative, except as otherwise noted.      Objective    /82   Pulse 80   Temp 97.9  F (36.6  C) (Tympanic)   Resp 16   Wt 72.2 kg (159 lb 3.2 oz)   LMP 10/22/2008 (Exact Date)   SpO2 99%   BMI 28.20 kg/m    Body mass index is 28.2 kg/m .  Physical Exam     Physical Exam   GENERAL: healthy, alert, in no acute distress  EYES: Grossly normal to inspection, EOMI, PERRL  HENT: Ear canals severe obstruction with cerumen bilaterally. TM pearly gray without effusion bilaterally visible after irrigation.  SKIN: 3 mm verruca on base of right foot.  PSYCH: Alert and oriented times 3;  Able to articulate logical thoughts. Affect is normal.

## 2021-07-13 ENCOUNTER — TELEPHONE (OUTPATIENT)
Dept: OBGYN | Facility: CLINIC | Age: 66
End: 2021-07-13

## 2021-07-13 NOTE — TELEPHONE ENCOUNTER
Patient needs instructions for urodynamic with Dr Mccarthy. Scheduled for 8/5. Please call back to advise.

## 2021-07-23 DIAGNOSIS — G50.0 TRIGEMINAL NEURALGIA: ICD-10-CM

## 2021-07-23 RX ORDER — CARBAMAZEPINE 200 MG/1
TABLET ORAL
Qty: 180 TABLET | Refills: 0 | Status: SHIPPED | OUTPATIENT
Start: 2021-07-23 | End: 2021-08-28

## 2021-07-23 NOTE — TELEPHONE ENCOUNTER
Routing refill request to provider for review/approval because:  Not reviewed at recent appointment, diagnosis trigeminal neuralgia  Please confirm if appointment due?   Mildred Huang RN, BSN  Message handled by CLINIC NURSE.

## 2021-07-30 ENCOUNTER — NURSE TRIAGE (OUTPATIENT)
Dept: FAMILY MEDICINE | Facility: CLINIC | Age: 66
End: 2021-07-30

## 2021-07-30 NOTE — TELEPHONE ENCOUNTER
"S-(situation): Patient calling experiencing flare up of trigeminal nerve issue.     B-(background): Patient reports has had more flare ups in past few days. Patient currently in [place], [place].     A-(assessment): Patient states is having trigeminal nerve flare up pain. Patient reports \"a week ago gabapentin was working but this last week, gabapentin is not working to alleviate trigeminal pain\".  Patient was taking gabapentin 3x daily, every 8 hours. Patient now taking tylenol currently 3x daily, unsure of dose. Tylenol takes awhile to set in and help with pain. Patient reports unable to go on bike ride \"it's too bumpy, that affects and causes a 'flare up' of trigeminal nerve\" issue. \"Can't touch anything on right side of nose, right side of mouth and down to my chin. Can't brush my teeth. When I have a flare up, it makes me sneeze and I can't run my nose on the right side as that is painful\". Pain is reported as 8/10. Patient is also taking carbamazepine twice daily.     R-(recommendations): Patient wondering if medications could be adjusted or if there is alternative advice.     Patient requesting call back with plan if able.    Jose FRANCIS RN      "

## 2021-07-30 NOTE — TELEPHONE ENCOUNTER
Attempted to reach patient, LDVM with message that per huddle with provider RAA, advised patient continue with gabapentin at prescribed dose and be seen in clinic for evaluation for pain if persists. Encouraged patient to call with any questions.     Jose FRANCIS RN

## 2021-08-03 ENCOUNTER — OFFICE VISIT (OUTPATIENT)
Dept: FAMILY MEDICINE | Facility: CLINIC | Age: 66
End: 2021-08-03
Payer: MEDICARE

## 2021-08-03 VITALS
OXYGEN SATURATION: 99 % | TEMPERATURE: 98.2 F | DIASTOLIC BLOOD PRESSURE: 80 MMHG | WEIGHT: 161 LBS | HEART RATE: 78 BPM | SYSTOLIC BLOOD PRESSURE: 142 MMHG | BODY MASS INDEX: 28.53 KG/M2 | HEIGHT: 63 IN | RESPIRATION RATE: 18 BRPM

## 2021-08-03 DIAGNOSIS — I10 BENIGN ESSENTIAL HYPERTENSION: Primary | ICD-10-CM

## 2021-08-03 DIAGNOSIS — G50.0 TRIGEMINAL NEURALGIA: ICD-10-CM

## 2021-08-03 PROCEDURE — 99214 OFFICE O/P EST MOD 30 MIN: CPT | Performed by: NURSE PRACTITIONER

## 2021-08-03 RX ORDER — GABAPENTIN 100 MG/1
100 CAPSULE ORAL 3 TIMES DAILY
Qty: 180 CAPSULE | Refills: 1 | Status: SHIPPED | OUTPATIENT
Start: 2021-08-03 | End: 2021-09-07

## 2021-08-03 RX ORDER — OXYCODONE HYDROCHLORIDE 5 MG/1
5 TABLET ORAL EVERY 4 HOURS PRN
COMMUNITY
Start: 2021-07-30 | End: 2021-08-30

## 2021-08-03 ASSESSMENT — PAIN SCALES - GENERAL: PAINLEVEL: EXTREME PAIN (8)

## 2021-08-03 ASSESSMENT — MIFFLIN-ST. JEOR: SCORE: 1249.42

## 2021-08-03 NOTE — PROGRESS NOTES
Assessment & Plan     Trigeminal neuralgia:discussed increasing gabapentin to 200 mg at 0800,100 mg at 1300 and 200 mg at 1800.  Increase carbamazepine 400 mg at 0800, 200 mg at 1300, and 400 mg at 1800.   May take oxycodone 5 mg for severe pain.   Is aware to gradually decrease dosages of gabapentin and carbamazepine as pain improves.  Follow up with neurology in next 2-4 weeks.     - gabapentin (NEURONTIN) 100 MG capsule; Take 1 capsule (100 mg) by mouth 3 times daily    Benign essential hypertension:/80, continue amlodipine 10 mg and benazepril 40 mg daily as prescribed.   Return in about 4 weeks (around 8/31/2021) for Routine Visit,neurology.    Susan Haase, APRN [facility]    Enrique Judge is a 65 year old who presents for the following health issues HPI     ED/UC Follow up:    Facility:  [facility] ED in [facility]  Date of visit: 07/30  Reason for visit:Trigeminal Nerve Pain.   Current Status: pain continues      Has a history of right trigeminal neuralgia that was diagnosed in 2019, was last seen by neurology in 2020 when she had a flare.  Since that visit she has been taking carbamazepine 200 mg daily and gabapentin 100 mg daily.     Started to have increased trigeminal pain about 3 weeks ago when on vacation she increased her carbamazepine to 200 mg bid and gabapentin 100 mg bid. This increase helped with pain until 7/28 when she had a dramatic increase in pain and was seen at an ER in [place], [place].  She was prescribed oxycodone for severe pain, carbamazepine 200 mg and gabapentin 100 mg were increased to tid. Continues to have right sided jaw pain with brushing teeth,chewing and talking.      Review of Systems   CONSTITUTIONAL: NEGATIVE for fever, chills, change in weight  ENT/MOUTH: NEGATIVE for ear, mouth and throat problems  RESP: NEGATIVE for significant cough or SOB  CV: NEGATIVE for chest pain, palpitations or peripheral edema  PSYCHIATRIC: NEGATIVE  "for changes in mood or affect      Objective    BP (!) 142/80   Pulse 78   Temp 98.2  F (36.8  C) (Oral)   Resp 18   Ht 1.6 m (5' 3\")   Wt 73 kg (161 lb)   LMP 10/22/2008 (Exact Date)   SpO2 99%   BMI 28.52 kg/m    Body mass index is 28.52 kg/m .  Physical Exam   GENERAL: healthy, alert and no distress  HENT: ear canals and TM's normal, nose and mouth without ulcers or lesions  NECK: no adenopathy, no asymmetry, masses, or scars and thyroid normal to palpation  RESP: lungs clear to auscultation - no rales, rhonchi or wheezes  CV: regular rate and rhythm, normal S1 S2, no S3 or S4, no murmur, click or rub, no peripheral edema and peripheral pulses strong  PSYCH: mentation appears normal, affect normal/bright              "

## 2021-08-05 ENCOUNTER — ALLIED HEALTH/NURSE VISIT (OUTPATIENT)
Dept: OBGYN | Facility: CLINIC | Age: 66
End: 2021-08-05
Payer: MEDICARE

## 2021-08-05 VITALS — DIASTOLIC BLOOD PRESSURE: 80 MMHG | SYSTOLIC BLOOD PRESSURE: 126 MMHG

## 2021-08-05 DIAGNOSIS — N36.42 INTRINSIC SPHINCTER DEFICIENCY: ICD-10-CM

## 2021-08-05 DIAGNOSIS — R35.0 URINARY FREQUENCY: Primary | ICD-10-CM

## 2021-08-05 DIAGNOSIS — N39.46 MIXED STRESS AND URGE URINARY INCONTINENCE: ICD-10-CM

## 2021-08-05 LAB
ALBUMIN UR-MCNC: NEGATIVE MG/DL
APPEARANCE UR: CLEAR
BILIRUB UR QL STRIP: NEGATIVE
COLOR UR AUTO: YELLOW
GLUCOSE UR STRIP-MCNC: NEGATIVE MG/DL
HGB UR QL STRIP: NEGATIVE
KETONES UR STRIP-MCNC: NEGATIVE MG/DL
LEUKOCYTE ESTERASE UR QL STRIP: NEGATIVE
NITRATE UR QL: NEGATIVE
PH UR STRIP: 7.5 [PH] (ref 5–7)
SP GR UR STRIP: 1.01 (ref 1–1.03)
UROBILINOGEN UR STRIP-ACNC: 0.2 E.U./DL

## 2021-08-05 PROCEDURE — 51741 ELECTRO-UROFLOWMETRY FIRST: CPT | Mod: 51 | Performed by: OBSTETRICS & GYNECOLOGY

## 2021-08-05 PROCEDURE — 51729 CYSTOMETROGRAM W/VP&UP: CPT | Performed by: OBSTETRICS & GYNECOLOGY

## 2021-08-05 PROCEDURE — 99213 OFFICE O/P EST LOW 20 MIN: CPT | Mod: 25 | Performed by: OBSTETRICS & GYNECOLOGY

## 2021-08-05 PROCEDURE — 81003 URINALYSIS AUTO W/O SCOPE: CPT | Performed by: OBSTETRICS & GYNECOLOGY

## 2021-08-05 PROCEDURE — 51797 INTRAABDOMINAL PRESSURE TEST: CPT | Performed by: OBSTETRICS & GYNECOLOGY

## 2021-08-05 RX ORDER — MIRABEGRON 50 MG/1
50 TABLET, EXTENDED RELEASE ORAL DAILY
Qty: 45 TABLET | Refills: 0 | Status: SHIPPED | OUTPATIENT
Start: 2021-08-05 | End: 2021-09-09

## 2021-08-05 RX ORDER — NITROFURANTOIN 25; 75 MG/1; MG/1
100 CAPSULE ORAL 2 TIMES DAILY
Qty: 6 CAPSULE | Refills: 0 | Status: SHIPPED | OUTPATIENT
Start: 2021-08-05 | End: 2021-08-08

## 2021-08-05 NOTE — PATIENT INSTRUCTIONS
You can reach your [facility] Care Team any time of the day by calling [phone]. This number will put you in touch with the 24 hour nurse line if the clinic is closed.    To contact your OB/GYN Station Coordinator/Surgery Scheduler please call [phone]. This is a direct number for your care team between 8 a.m. and 4 p.m. Monday through Friday.    [facility] is open for your convenience:  Monday through Friday 8 a.m. to 6 p.m.  Closed weekends and all major holidays.

## 2021-08-05 NOTE — Clinical Note
Edmund Mccarthy M.D. [address] [id number] [phone] phone  [phone] fax       Dear Samina,         Thank you for the opportunity to see your patient. Please see my office visit notes for your review.  As always, I appreciate the opportunity to participate in your patient's care.     Sincerely yours,    Edmund Mccarthy M.D.

## 2021-08-05 NOTE — NURSING NOTE
"Chief Complaint   Patient presents with     Urodynamic Study       Initial /80   LMP 10/22/2008 (Exact Date)  Estimated body mass index is 28.52 kg/m  as calculated from the following:    Height as of 8/3/21: 1.6 m (5' 3\").    Weight as of 8/3/21: 73 kg (161 lb).  BP completed using cuff size: regular    Questioned patient about current smoking habits.  Pt. has never smoked.          The following HM Due: NONE      The following patient reported/Care Every where data was sent to:  P ABSTRACT QUALITY INITIATIVES [[id number]]        Linda Holland [facility]                 "

## 2021-08-05 NOTE — PROGRESS NOTES
The patient is a. 65 year old  white female  the largest 9 pounds 2 ounces, this was her first, she states this was a hard delivery and was forceps assisted, the rest were vaginal deliveries with one  delivery that did not survive, tubal sterilization, last menstrual period approximately age 49 for contraception, not on HRT with no vaginal bleeding since whom I am asked to see by RADHA Maldonado  for evaluation of a symptomatic midline cystocele that the patient states she first noticed approximately 3 weeks ago.  The patient has pelvic floor pressure and incomplete emptying but no other bladder symptoms.  Patient states that she has a history of constipation and oftentimes uses a finger vaginally to aid in expelling of stool.  The patient underwent a SPARC procedure by Dr. Molina in  and states that it worked well for the first 2 years.  After that time she had trouble with urgency and frequency is been treated with Sanctura oxybutynin and Vesicare.  The patient denies any leakage of urine with laughing lifting coughing or straining but says the urgency and frequency are bothersome to her.  Patient does wear protection.  Pregnancy history includes forcep assisted vaginal delivery with her first followed by vaginal deliveries,, no other known history of bladder or bowel injury. Patient complains of urinary leakage with urgency frequency but no symptoms directly attributable to stress incontinence.  At the time of her initial evaluation on 2021 the patient had a grade 2-3 midline cystocele, greater than 30 degrees of UV angle hypermobility and grade 1 cervical descent.  In reviewing with her the findings the risks and benefits the alternatives the patient would like to try a pessary.  She presents today for a urodynamic study for further evaluation.  Risks, benefits, and alternative modes of therapy discussed at length. Pathophysiology of the disease process reviewed, all of the  patients questions answered and informed consent obtained.       Asthma: Denies  Smoking: Denies  Recurrent UTIs: States that she has about 1 bladder infection a year none recently  Hematuria: Denies  Diabetes: Denies  Related medication usage:   Current Outpatient Medications   Medication     amLODIPine-benazepril (LOTREL) 10-40 MG capsule     carBAMazepine (TEGRETOL) 200 MG tablet     gabapentin (NEURONTIN) 100 MG capsule     mirabegron (MYRBETRIQ) 50 MG 24 hr tablet     Multiple Vitamins-Minerals (CENTRUM PO)     nitroFURantoin macrocrystal-monohydrate (MACROBID) 100 MG capsule     oxyCODONE (ROXICODONE) 5 MG tablet     simvastatin (ZOCOR) 20 MG tablet     No current facility-administered medications for this visit.     The patient does use Neurontin for trigeminal neuralgia  Incontinence of stool or flatus: Denies states that she does have a history of constipation  Symptoms of pelvic relaxation/prolapse: As above  Voiding or defacatory dysfunction: Splints to defecate but not to void.  Repositions to void.  Does notice a sensation of incomplete emptying  Previous evaluation: As above  Kegel exercises: Did not help  Nocturia: Denies  Fluids: Patient states that she does try to avoid bladder irritants in the past does go back to drinking 2 cups of coffee in the morning 7 to 8 glasses of water a day and a glass of wine with dinner in the summer and notices that her symptoms are gotten worse  Urgency: Yes with occasional urge incontinence as well as involuntary loss with standing and activity    Past Medical History:   Diagnosis Date     Allergic rhinitis due to other allergen      HTN (hypertension)      Hyperlipidemia LDL goal <130 2008     Past Surgical History:   Procedure Laterality Date     C LIGATION,FALLOPIAN TUBE W/       LAPAROSCOPIC SUSPENSION BLADDER NECK       Family History   Problem Relation Age of Onset     No Known Problems Mother      No Known Problems Father      Leukemia Maternal  Grandmother      Diabetes Son         type 1     Family History Negative No family hx of      C.A.D. No family hx of      Cancer - colorectal No family hx of      Social History     Socioeconomic History     Marital status:      Spouse name: Thomas     Number of children: 4     Years of education: Not on file     Highest education level: Not on file   Occupational History     Occupation: bus      Employer: [employer]      Comment: [employer]   Tobacco Use     Smoking status: Never Smoker     Smokeless tobacco: Never Used   Substance and Sexual Activity     Alcohol use: Yes     Alcohol/week: 0.0 standard drinks     Comment: occ     Drug use: No     Sexual activity: Not Currently     Partners: Male   Other Topics Concern     Parent/sibling w/ CABG, MI or angioplasty before 65F 55M? No      Service No     Blood Transfusions No     Caffeine Concern No     Occupational Exposure No     Hobby Hazards No     Sleep Concern Yes     Stress Concern Yes     Weight Concern No     Special Diet Yes     Comment: low sodium     Back Care No     Exercise Yes     Comment: 2 x per week     Bike Helmet Yes     Seat Belt Yes     Self-Exams Yes   Social History Narrative     Not on file     Social Determinants of Health     Financial Resource Strain:      Difficulty of Paying Living Expenses:    Food Insecurity:      Worried About Running Out of Food in the Last Year:      Ran Out of Food in the Last Year:    Transportation Needs:      Lack of Transportation (Medical):      Lack of Transportation (Non-Medical):    Physical Activity:      Days of Exercise per Week:      Minutes of Exercise per Session:    Stress:      Feeling of Stress :    Social Connections:      Frequency of Communication with Friends and Family:      Frequency of Social Gatherings with Friends and Family:      Attends Orthodoxy Services:      Active Member of Clubs or Organizations:      Attends Club or Organization Meetings:       Marital Status:    Intimate Partner Violence:      Fear of Current or Ex-Partner:      Emotionally Abused:      Physically Abused:      Sexually Abused:      Vitals: /80   LMP 10/22/2008 (Exact Date)   BMI= There is no height or weight on file to calculate BMI.    Constitutional: healthy, alert and no distress  Genitourinary: Normal external genitalia without lesions and the grade 2-3 midline cystocele is unchanged.  Grade 1 cervical descent is unchanged.  Bimanual exam the uterus is parous mobile and restoration of apical support does not completely reduce her cystocele.      Urodynamic Summary:    Uroflow:        Voided Volume-500 cc.  Max Flow Rate-38.4 cc/sec.  Avg. Flow Rate-17.8 cc/sec.  Post Void Residual-20 cc.        1st sensation-40 cc.  1st desire-118 cc  Strong  Desire-307 cc  Bladder capacity-520 cc      Pdet-less than 10.  I did not see evidence of detrussor instability.  nl Bladder compliance    Max UCP-25.  VLPP-less than 75.  There is evidence of intrinsic sphincter deficiency as well as evidence of urinary stress incontinence.  There was incontinence of urine with cough and valsalva particularly with restoration of apical compartment support and reduction of the cystocele that has resolved with correction of this defect at 225 cc.    Assess:      Normal bladder capacity      Acceptable uroflow.      No evidence of voiding dysfunction or obstruction with adequate emptying.       There is evidence of pelvic floor relaxation with a symptomatic grade 2 midline cystocele with grade 1 apical descent is improved with restoration of apical support as well as with support of the urethrovesical angle.  There is evidence of mixed urinary incontinence with a component of urgency frequency and urge incontinence that may be exacerbated with the use of bladder irritants.  I also saw evidence of urinary stress incontinence and intrinsic sphincter deficiency and this patient has had a previous SPARC  procedure done  I reviewed these findings with the patient at length discussed the pathophysiology of the disease process and the risks benefits and alternative forms of therapy.We discussed the Retopubic tension free vaginal tape sling.  We discussed that in the presence of urgency frequency and intrinsic sphincter deficiency additional treatment such as bulking agents may be at necessary to achieve acceptable control.  The patient has been fitted with a pessary in the past.  We also discussed the use of antimuscarinics timed voidings and elimination of bladder irritants to see if acceptable control can be achieved without surgical intervention.  The patient is interested in trying this methodology.  We reviewed the risks, benefits, and alternative modes of treatment, the success rates, issues of retention, as well as pre and post op cares and restrictions.  All of her questions have been answered and informed consent has been obtained.      Plan:     Recommend we begin a trial of Myrbetriq 50 mg daily with timed voidings elimination of bladder irritants.  I have asked the patient to keep a voiding diary for 2 days now then begin the medication and after 1 month of therapy repeat a voiding diary for 2 days I like to see her back in a month for follow-up.  I will reassess progress and make appropriate treatment recommendations at that time         post urodynamic testing care sheet and an Rx for antibiotic prophylaxis      given      (R35.0) Urinary frequency  (primary encounter diagnosis)  Comment: As above  Plan: UA Macro with Reflex to Micro and Culture - lab        collect        Done      Edmund Mccarthy M.D.          ]

## 2021-08-06 ENCOUNTER — TELEPHONE (OUTPATIENT)
Dept: OBGYN | Facility: CLINIC | Age: 66
End: 2021-08-06

## 2021-08-06 PROBLEM — N36.42 INTRINSIC SPHINCTER DEFICIENCY: Status: ACTIVE | Noted: 2021-08-06

## 2021-08-06 PROBLEM — N39.46 MIXED STRESS AND URGE URINARY INCONTINENCE: Status: ACTIVE | Noted: 2021-04-24

## 2021-08-06 NOTE — TELEPHONE ENCOUNTER
[facility] pharmacy notified us that pts insurance does not cover myrbetriq.     If you would like to do a  PA, please route this message to the [facility] PA MED pool.      Scarlet KOCH RN

## 2021-08-06 NOTE — TELEPHONE ENCOUNTER
Central Prior Authorization Team   Phone: [phone]      PA Initiation    Medication: Myrbetriq  Insurance Company:  - Phone [phone] Fax [phone]  Pharmacy Filling the Rx: [facility] [address] [place] [address] - [id number]  Filling Pharmacy Phone:    Filling Pharmacy Fax:    Start Date: 8/6/2021

## 2021-08-06 NOTE — TELEPHONE ENCOUNTER
Prior Authorization Approval    Authorization Effective Date: 7/7/2021  Authorization Expiration Date: 12/31/2099  Medication: Myrbetriq ER 50 mg - Approved  Approved Dose/Quantity:   Reference #:     Insurance Company: [employer] [phone] Fax [phone]  Expected CoPay:       CoPay Card Available: No    Foundation Assistance Needed:    Which Pharmacy is filling the prescription (Not needed for infusion/clinic administered): [facility] [address] [place], [place] - [address] [facility]  Pharmacy Notified: Yes  Patient Notified: Yes **Instructed pharmacy to notify patient when script is ready to /ship.**

## 2021-08-06 NOTE — TELEPHONE ENCOUNTER
This patient has urgency frequency and urge incontinence that I am treating her for. She has been on Vesicare oxybutynin and Sanctura in the past without adequate relief. The patient was very concerned in the past of side effects of dry mouth dry eyes and constipation and hence found the medication unacceptable. I written for a prescription for Myrbetriq 50 mg daily as this medication works by a different mechanism and has a much lower incidence of those anticholinergic side effects. Can you please complete a prior authorization on this request  Thank you

## 2021-08-20 ENCOUNTER — TELEPHONE (OUTPATIENT)
Dept: FAMILY MEDICINE | Facility: CLINIC | Age: 66
End: 2021-08-20

## 2021-08-20 RX ORDER — OXYCODONE HYDROCHLORIDE 5 MG/1
5 TABLET ORAL EVERY 4 HOURS PRN
Qty: 28 TABLET | Refills: 0 | Status: CANCELLED | OUTPATIENT
Start: 2021-08-20
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
## 2021-08-20 NOTE — TELEPHONE ENCOUNTER
Called and spoke with patient, relayed provider message. Patient verbalized understanding and agreed with plan. Patient does not need any refills at this time.     Jose FRANCIS RN

## 2021-08-20 NOTE — TELEPHONE ENCOUNTER
Recommend increasing tegretol to 600 bid and take oxy as needed.  Does she need refills sent anywhere?

## 2021-08-27 VITALS
DIASTOLIC BLOOD PRESSURE: 95 MMHG | RESPIRATION RATE: 18 BRPM | TEMPERATURE: 98 F | WEIGHT: 175 LBS | OXYGEN SATURATION: 100 % | BODY MASS INDEX: 31 KG/M2 | HEART RATE: 81 BPM | SYSTOLIC BLOOD PRESSURE: 166 MMHG

## 2021-08-27 PROCEDURE — 99283 EMERGENCY DEPT VISIT LOW MDM: CPT

## 2021-08-28 ENCOUNTER — HOSPITAL ENCOUNTER (EMERGENCY)
Facility: CLINIC | Age: 66
Discharge: HOME | End: 2021-08-28
Attending: EMERGENCY MEDICINE | Admitting: EMERGENCY MEDICINE
Payer: MEDICARE

## 2021-08-28 DIAGNOSIS — G50.0 TRIGEMINAL NEURALGIA: ICD-10-CM

## 2021-08-28 DIAGNOSIS — E78.5 HYPERLIPIDEMIA LDL GOAL <130: ICD-10-CM

## 2021-08-28 DIAGNOSIS — I10 HTN, GOAL BELOW 140/90: ICD-10-CM

## 2021-08-28 PROCEDURE — 250N000012 HC RX MED GY IP 250 OP 636 PS 637: Mod: GY | Performed by: EMERGENCY MEDICINE

## 2021-08-28 RX ORDER — PREDNISONE 20 MG/1
40 TABLET ORAL ONCE
Status: COMPLETED | OUTPATIENT
Start: 2021-08-28 | End: 2021-08-28

## 2021-08-28 RX ORDER — PREDNISONE 20 MG/1
TABLET ORAL
Qty: 8 TABLET | Refills: 0 | Status: SHIPPED | OUTPATIENT
Start: 2021-08-28 | End: 2021-08-31

## 2021-08-28 RX ORDER — CARBAMAZEPINE 200 MG/1
200 TABLET ORAL 2 TIMES DAILY
COMMUNITY
End: 2021-08-30

## 2021-08-28 RX ADMIN — PREDNISONE 40 MG: 20 TABLET ORAL at 02:18

## 2021-08-28 NOTE — ED TRIAGE NOTES
Pt arrives with trigeminal pain that started about 10 pm  Tonight.  States has a history of this for the last 3-4 years.  Took 1 Oxycodone at just prior to arriving which has helped with the pain

## 2021-08-28 NOTE — ED PROVIDER NOTES
History     Chief Complaint:  Facial Pain    HPI   Nu Taylor is a 66 year old female with history of trigeminal neuralgia who presents with facial pain that started about 3 hours ago. She notes that these flares are severe for several minutes and she just wants a solution for relief. Her  reports that she is taking Gabapentin, increased from 600 to 1200 mg, and Tegretol, which she has been on for years. Prior to arrival she took an Oxycodone with some relief however she prefers not to take this. She does follow with a neurologist.     Review of Systems   HENT:        +Facial Pain   All other systems reviewed and are negative.    Allergies:  Norco [Hydrocodone-Acetaminophen]    Medications:  Tegretol  Roxicodone  Myrbetriq  Neurontin  Lotrel  Zocor    Past Medical History:    Hypertension  Hyperlipidemia  Trigeminal neuralgia  Intrinsic sphincter deficiency  Cystocele  Complete tear of right rotator cuff  DDD  Mitral valve stenosis    Past Surgical History:     section  Tubal ligation  Suspension bladder neck    Family History:    Son: Type 1 diabetes    Social History:  Patient presents to the ED with male visitor.    Physical Exam     Patient Vitals for the past 24 hrs:   BP Temp Temp src Pulse Resp SpO2 Weight   21 2332 166/95 98  F (36.7  C) Temporal 81 18 100 % 79.4 kg (175 lb)     Physical Exam  Nursing note and vitals reviewed.  Constitutional: Cooperative.   HENT:   Mouth/Throat: Mucous membranes are normal.   Cardiovascular: Normal rate, regular rhythm and normal heart sounds.  No murmur.  Pulmonary/Chest: Effort normal and breath sounds normal. No respiratory distress. No wheezes. No rales.   Neurological: Alert. Oriented x4  Skin: Skin is warm and dry.   Psychiatric: Normal mood and affect.     Emergency Department Course     Reviewed:  I reviewed nursing notes, vitals, and past medical history.     Assessments:  0145 I obtained history and examined the patient as noted above.      Interventions:  0218 Deltasone 40 mg PO    Disposition:  The patient was discharged to home.     Impression & Plan     Medical Decision Making:  Nu Taylor is a 66 year old female with a history of trigeminal neuralgia presents with crescendoing pain episodes. Fortunately, she is symptom free after oxycodone here. She does not wish to continue taking opioids. We have gone over her medications which included Carbamazepine and a high dose of gabapentin. After discussion we will try a 5 day Prednisone burst to see if the anti-inflammatory effects help calm down these increasing frequency reactions with plans to follow up with neurology for ongoing management. She can certain use her oxycodone intermittently in the short term.     Diagnosis:    ICD-10-CM    1. Trigeminal neuralgia  G50.0        Discharge Medications:  New Prescriptions    PREDNISONE (DELTASONE) 20 MG TABLET    Take two tablets (= 40mg) each day for 4 (four) days       Scribe Disclosure:  Chapito KING, am serving as a scribe at 1:37 AM on 8/28/2021 to document services personally performed by Martin Jorgensen MD based on my observations and the provider's statements to me.            Martin Jorgensen MD  08/28/21 0532

## 2021-08-30 DIAGNOSIS — G50.0 TRIGEMINAL NEURALGIA: Primary | ICD-10-CM

## 2021-08-30 RX ORDER — SIMVASTATIN 20 MG
TABLET ORAL
Qty: 90 TABLET | Refills: 0 | Status: SHIPPED | OUTPATIENT
Start: 2021-08-30 | End: 2021-09-07

## 2021-08-30 RX ORDER — AMLODIPINE AND BENAZEPRIL HYDROCHLORIDE 10; 40 MG/1; MG/1
CAPSULE ORAL
Qty: 90 CAPSULE | Refills: 0 | Status: SHIPPED | OUTPATIENT
Start: 2021-08-30 | End: 2021-09-07

## 2021-08-30 NOTE — TELEPHONE ENCOUNTER
Routing refill request to provider for review/approval because:  Drug interaction warning and   BP Readings from Last 3 Encounters:   08/27/21 (!) 166/95   08/05/21 126/80   08/03/21 (!) 142/80      Pt has appt in 1` week     Annalisa Westfall RN

## 2021-08-30 NOTE — TELEPHONE ENCOUNTER
She guillen an appt with RADHA Tom today. She was in ER on Friday and will not have enough of the carbamazepine, oxycodone and the prednisone to get her through her rescheduled appt.    She is going on vacation,  will be gone for a week and will need these today. Elle Jimenez RN on 8/30/2021 at 8:33 AM

## 2021-08-30 NOTE — TELEPHONE ENCOUNTER
Pt called back. She was supposed to have appt today that was cancelled. Going on vacation and needs these today.Elle Jimenez RN on 8/30/2021 at 8:32 AM

## 2021-08-30 NOTE — TELEPHONE ENCOUNTER
Routing refill request to provider for review/approval because:  Drug not on the [facility] refill protocol   Labs not current:  carbamazepine  Medication is reported/historical    Jose FRANCIS RN

## 2021-08-31 ENCOUNTER — TELEPHONE (OUTPATIENT)
Dept: NEUROSURGERY | Facility: CLINIC | Age: 66
End: 2021-08-31

## 2021-08-31 DIAGNOSIS — G50.0 TRIGEMINAL NEURALGIA: ICD-10-CM

## 2021-08-31 RX ORDER — CARBAMAZEPINE 200 MG/1
200 TABLET ORAL 2 TIMES DAILY
Qty: 60 TABLET | Refills: 1 | Status: SHIPPED | OUTPATIENT
Start: 2021-08-31 | End: 2021-09-01

## 2021-08-31 RX ORDER — OXYCODONE HYDROCHLORIDE 5 MG/1
5 TABLET ORAL EVERY 4 HOURS PRN
Qty: 28 TABLET | Refills: 0 | Status: SHIPPED | OUTPATIENT
Start: 2021-08-31 | End: 2022-06-20

## 2021-08-31 RX ORDER — PREDNISONE 20 MG/1
TABLET ORAL
Qty: 8 TABLET | Refills: 0 | Status: SHIPPED | OUTPATIENT
Start: 2021-08-31 | End: 2021-09-07

## 2021-08-31 RX ORDER — CARBAMAZEPINE 200 MG/1
200 TABLET ORAL 2 TIMES DAILY
Qty: 60 TABLET | Refills: 1 | OUTPATIENT
Start: 2021-08-31

## 2021-08-31 NOTE — TELEPHONE ENCOUNTER
[facility] Call Center    Phone Message    May a detailed message be left on voicemail: yes     Reason for Call: Other: Patient calling to schedule a follow up with Stefanie Valenzuela. States that she is having more issues with her trigeminal neuralgia. Patient states that she was in the emergency room last Friday 8/27. Mentions that the medications she is on are not working and it is painful.     Please advise and call patient back to discuss scheduling options      Action Taken: Other: [facility] NEUROSURGERY     Travel Screening: Not Applicable

## 2021-08-31 NOTE — TELEPHONE ENCOUNTER
Left patient a message to please return call to discuss Trigeminal Neuralgia pain and medications.    Call Mara  [phone]

## 2021-08-31 NOTE — TELEPHONE ENCOUNTER
Pt calls to check status, appointment canceled due to provider ill call, has appointment rescheduled now for 9/7, pt informs:    ~prednisone burst working, wants refill, will defer to MM  ~likes to have oxycodone on hand, was going to discuss at visit but now rescheduled  ~ER told to f/u with neurology, pt has not scheduled appointment, wants PCP to advise  ~also wants tegretol refilled, confirm ongoing    Routed to MM, please confirm refills and plan, route to inform pt  [phone] (home)  Mildred Huang RN, BSN  Message handled by CLINIC NURSE.

## 2021-08-31 NOTE — TELEPHONE ENCOUNTER
"Spoke with patient.  Patient states Prednisone given by ED and has been a \"Miracle drug\".   Right sided facial pain was controled in the past  with Carbamazepine 200mg 3 tablets 2x daily AND   Gabapentin 100mg 2 tablets 2x daily.  Patient states getting little zaps of pain after 8-10 hours from taking medication.  Pain starts in nose and top of cheek on right side.  Triggers include bending over, talking to much.      Will have  call for first available appointment, it may be 4 weeks.  Patient does have my name and number if needed.  Medications are ordered by Dr Bone.  Patient has appointment with Dr Bone on 9/7/21.    Voices understanding.     "

## 2021-09-01 ENCOUNTER — TELEPHONE (OUTPATIENT)
Dept: FAMILY MEDICINE | Facility: CLINIC | Age: 66
End: 2021-09-01

## 2021-09-01 ENCOUNTER — TELEPHONE (OUTPATIENT)
Dept: NEUROSURGERY | Facility: CLINIC | Age: 66
End: 2021-09-01

## 2021-09-01 DIAGNOSIS — G50.0 TRIGEMINAL NEURALGIA: ICD-10-CM

## 2021-09-01 RX ORDER — CARBAMAZEPINE 200 MG/1
600 TABLET ORAL 2 TIMES DAILY
Qty: 360 TABLET | Refills: 1 | Status: SHIPPED | OUTPATIENT
Start: 2021-09-01 | End: 2022-01-03

## 2021-09-01 NOTE — TELEPHONE ENCOUNTER
"Pt calls, questions on carbamazepine dosing, pt informs:    ~ has been taking \"3 tablets twice daily\"    ~rx sent is twice daily dosing, pt says incorrect    ~pt saw  8/3/21 and told:  \"To increase carbamazepine 400 mg at 0800, 200 mg at 1300, and 400 mg at 1800\"     ~Pt informs saw another ER after 8/3 and told to take 3 twice daily    ~ pt has appointment next week with MM, will continue 3 tablets twice daily, has supply, will discuss at visit, INFORM her if needed otherwise she will discuss at visit next week    Mildred Huang RN, BSN  Message handled by CLINIC NURSE.    "

## 2021-09-02 ENCOUNTER — TELEPHONE (OUTPATIENT)
Dept: NEUROSURGERY | Facility: CLINIC | Age: 66
End: 2021-09-02

## 2021-09-04 ENCOUNTER — HEALTH MAINTENANCE LETTER (OUTPATIENT)
Age: 66
End: 2021-09-04

## 2021-09-07 ENCOUNTER — OFFICE VISIT (OUTPATIENT)
Dept: FAMILY MEDICINE | Facility: CLINIC | Age: 66
End: 2021-09-07
Payer: MEDICARE

## 2021-09-07 VITALS
WEIGHT: 159.9 LBS | DIASTOLIC BLOOD PRESSURE: 90 MMHG | HEART RATE: 86 BPM | HEIGHT: 63 IN | SYSTOLIC BLOOD PRESSURE: 152 MMHG | TEMPERATURE: 97.7 F | RESPIRATION RATE: 20 BRPM | BODY MASS INDEX: 28.33 KG/M2

## 2021-09-07 DIAGNOSIS — Z23 NEED FOR VACCINATION: ICD-10-CM

## 2021-09-07 DIAGNOSIS — E78.00 HYPERCHOLESTEROLEMIA: ICD-10-CM

## 2021-09-07 DIAGNOSIS — Z12.31 VISIT FOR SCREENING MAMMOGRAM: ICD-10-CM

## 2021-09-07 DIAGNOSIS — G50.0 TRIGEMINAL NEURALGIA: ICD-10-CM

## 2021-09-07 DIAGNOSIS — I10 BENIGN ESSENTIAL HYPERTENSION: ICD-10-CM

## 2021-09-07 DIAGNOSIS — Z00.00 MEDICARE WELCOME EXAM: Primary | ICD-10-CM

## 2021-09-07 LAB
ERYTHROCYTE [DISTWIDTH] IN BLOOD BY AUTOMATED COUNT: 12.1 % (ref 10–15)
HCT VFR BLD AUTO: 38.6 % (ref 35–47)
HGB BLD-MCNC: 12.9 G/DL (ref 11.7–15.7)
MCH RBC QN AUTO: 31.4 PG (ref 26.5–33)
MCHC RBC AUTO-ENTMCNC: 33.4 G/DL (ref 31.5–36.5)
MCV RBC AUTO: 94 FL (ref 78–100)
PLATELET # BLD AUTO: 327 10E3/UL (ref 150–450)
RBC # BLD AUTO: 4.11 10E6/UL (ref 3.8–5.2)
WBC # BLD AUTO: 5.5 10E3/UL (ref 4–11)

## 2021-09-07 PROCEDURE — G0008 ADMIN INFLUENZA VIRUS VAC: HCPCS | Performed by: PHYSICIAN ASSISTANT

## 2021-09-07 PROCEDURE — 90662 IIV NO PRSV INCREASED AG IM: CPT | Performed by: PHYSICIAN ASSISTANT

## 2021-09-07 PROCEDURE — G0402 INITIAL PREVENTIVE EXAM: HCPCS | Performed by: PHYSICIAN ASSISTANT

## 2021-09-07 PROCEDURE — 36415 COLL VENOUS BLD VENIPUNCTURE: CPT | Performed by: PHYSICIAN ASSISTANT

## 2021-09-07 PROCEDURE — G0009 ADMIN PNEUMOCOCCAL VACCINE: HCPCS | Performed by: PHYSICIAN ASSISTANT

## 2021-09-07 PROCEDURE — 85027 COMPLETE CBC AUTOMATED: CPT | Performed by: PHYSICIAN ASSISTANT

## 2021-09-07 PROCEDURE — 80061 LIPID PANEL: CPT | Performed by: PHYSICIAN ASSISTANT

## 2021-09-07 PROCEDURE — 80053 COMPREHEN METABOLIC PANEL: CPT | Performed by: PHYSICIAN ASSISTANT

## 2021-09-07 PROCEDURE — 90732 PPSV23 VACC 2 YRS+ SUBQ/IM: CPT | Performed by: PHYSICIAN ASSISTANT

## 2021-09-07 RX ORDER — AMLODIPINE AND BENAZEPRIL HYDROCHLORIDE 10; 40 MG/1; MG/1
CAPSULE ORAL
Qty: 90 CAPSULE | Refills: 3 | Status: SHIPPED | OUTPATIENT
Start: 2021-09-07 | End: 2022-03-28

## 2021-09-07 RX ORDER — GABAPENTIN 100 MG/1
200 CAPSULE ORAL 2 TIMES DAILY
Qty: 360 CAPSULE | Refills: 1 | COMMUNITY
Start: 2021-09-07 | End: 2021-09-10

## 2021-09-07 RX ORDER — GABAPENTIN 100 MG/1
100 CAPSULE ORAL 3 TIMES DAILY
Qty: 180 CAPSULE | Refills: 1 | Status: CANCELLED | OUTPATIENT
Start: 2021-09-07

## 2021-09-07 RX ORDER — SIMVASTATIN 20 MG
20 TABLET ORAL AT BEDTIME
Qty: 90 TABLET | Refills: 3 | Status: SHIPPED | OUTPATIENT
Start: 2021-09-07 | End: 2021-09-09

## 2021-09-07 ASSESSMENT — ENCOUNTER SYMPTOMS
SORE THROAT: 0
FEVER: 0
MYALGIAS: 0
FREQUENCY: 1
HEMATURIA: 0
NERVOUS/ANXIOUS: 0
ABDOMINAL PAIN: 0
DYSURIA: 0
HEARTBURN: 0
CONSTIPATION: 0
PARESTHESIAS: 0
DIZZINESS: 0
CHILLS: 0
EYE PAIN: 0
BREAST MASS: 0
SHORTNESS OF BREATH: 0
WEAKNESS: 0
DIARRHEA: 0
COUGH: 0
ARTHRALGIAS: 0
HEADACHES: 0
HEMATOCHEZIA: 0
NAUSEA: 0
PALPITATIONS: 0
JOINT SWELLING: 0

## 2021-09-07 ASSESSMENT — MIFFLIN-ST. JEOR: SCORE: 1239.43

## 2021-09-07 ASSESSMENT — ACTIVITIES OF DAILY LIVING (ADL): CURRENT_FUNCTION: NO ASSISTANCE NEEDED

## 2021-09-07 NOTE — PROGRESS NOTES
Prior to immunization administration, verified patients identity using patient s name and date of birth. Please see Immunization Activity for additional information.     Screening Questionnaire for Adult Immunization    Are you sick today?   No   Do you have allergies to medications, food, a vaccine component or latex?   No   Have you ever had a serious reaction after receiving a vaccination?   No   Do you have a long-term health problem with heart, lung, kidney, or metabolic disease (e.g., diabetes), asthma, a blood disorder, no spleen, complement component deficiency, a cochlear implant, or a spinal fluid leak?  Are you on long-term aspirin therapy?   No   Do you have cancer, leukemia, HIV/AIDS, or any other immune system problem?   No   Do you have a parent, brother, or sister with an immune system problem?   No   In the past 3 months, have you taken medications that affect  your immune system, such as prednisone, other steroids, or anticancer drugs; drugs for the treatment of rheumatoid arthritis, Crohn s disease, or psoriasis; or have you had radiation treatments?   No   Have you had a seizure, or a brain or other nervous system problem?   No   During the past year, have you received a transfusion of blood or blood    products, or been given immune (gamma) globulin or antiviral drug?   No   For women: Are you pregnant or is there a chance you could become       pregnant during the next month?   No   Have you received any vaccinations in the past 4 weeks?   No     Immunization questionnaire answers were all negative.        Per orders of Dr. Giraldo, injection of flu given by Caryl Roper. Patient instructed to remain in clinic for 15 minutes afterwards, and to report any adverse reaction to me immediately.       Screening performed by Caryl Roper on 9/7/2021 at 4:55 PM.

## 2021-09-07 NOTE — PROGRESS NOTES
"SUBJECTIVE:   Nu Taylor is a 66 year old female who presents for Preventive Visit.      Patient has been advised of split billing requirements and indicates understanding: Yes   Yes,  Visual Acuity:  Right Eye: 10/16   Left Eye: 10/16  Both Eyes: 10/16    Healthy Habits:     In general, how would you rate your overall health?  Fair    Frequency of exercise:  4-5 days/week    Duration of exercise:  30-45 minutes    Do you usually eat at least 4 servings of fruit and vegetables a day, include whole grains    & fiber and avoid regularly eating high fat or \"junk\" foods?  Yes    Taking medications regularly:  Yes    Medication side effects:  Not applicable    Ability to successfully perform activities of daily living:  No assistance needed    Home Safety:  No safety concerns identified    Hearing Impairment:  No hearing concerns    In the past 6 months, have you been bothered by leaking of urine? Yes    In general, how would you rate your overall mental or emotional health?  Good      PHQ-2 Total Score: 0    Additional concerns today:  No    Do you feel safe in your environment? Yes    Have you ever done Advance Care Planning? (For example, a Health Directive, POLST, or a discussion with a medical provider or your loved ones about your wishes): No, advance care planning information given to patient to review.  Patient declined advance care planning discussion at this time.    Has neurology appointment in Oct for trigeminal neuralgia.       Fall risk  Fallen 2 or more times in the past year?: No  Any fall with injury in the past year?: No    Cognitive Screening   1) Repeat 3 items (Leader, Season, Table)    2) Clock draw: NORMAL  3) 3 item recall: Recalls 3 objects  Results: 3 items recalled: COGNITIVE IMPAIRMENT LESS LIKELY    Mini-CogTM Copyright [employer]. Licensed by the author for use in [facility]; reprinted with permission (nicho@.[facility]). All rights reserved.        Reviewed and updated as needed " this visit by clinical staff  Tobacco  Allergies  Meds  Problems  Med Hx  Surg Hx  Fam Hx  Soc Hx          Reviewed and updated as needed this visit by Provider  Tobacco  Allergies  Meds  Problems  Med Hx  Surg Hx  Fam Hx         Social History     Tobacco Use     Smoking status: Never Smoker     Smokeless tobacco: Never Used   Substance Use Topics     Alcohol use: Yes     Alcohol/week: 0.0 standard drinks     Comment: occ     If you drink alcohol do you typically have >3 drinks per day or >7 drinks per week? No    Alcohol Use 9/7/2021   Prescreen: >3 drinks/day or >7 drinks/week? No   Prescreen: >3 drinks/day or >7 drinks/week? -       Current providers sharing in care for this patient include:   Patient Care Team:  Samina Bone PA-C as PCP - General (Family Medicine)  Samina Bone PA-C as Assigned PCP  Edmund Mccarthy MD as Assigned OBGYN Provider  Stefanie Valenzuela APRN CNP as Assigned Neuroscience Provider    The following health maintenance items are reviewed in Epic and correct as of today:  Health Maintenance Due   Topic Date Due     FALL RISK ASSESSMENT  Never done     INFLUENZA VACCINE (1) 09/01/2021     Pneumococcal Vaccine: 65+ Years (1 of 1 - PPSV23) 08/28/2021     LIPID  09/09/2021     BP Readings from Last 3 Encounters:   09/07/21 (!) 152/90   08/27/21 (!) 166/95   08/05/21 126/80    Wt Readings from Last 3 Encounters:   09/07/21 72.5 kg (159 lb 14.4 oz)   08/27/21 79.4 kg (175 lb)   08/03/21 73 kg (161 lb)          Recent Labs   Lab Test 09/09/20  1443 08/29/19  1116 07/31/18  0823 05/30/17  1524 02/10/16  1028 10/09/15  1707   A1C 5.0  --   --   --   --   --    * 111* 105* 122*  --   --    HDL 87 73 69 75  --   --    TRIG 76 111 85 76  --   --    ALT 41 32  --  26  --   --    CR 0.69 0.62 0.66 0.61  --   --    GFRESTIMATED >90 >90 >90 >90  Non African American GFR Calc    --   --    GFRESTBLACK >90 >90 >90 >90  African American GFR Calc    --   --   "  POTASSIUM 3.7 3.6 3.9 4.1  --   --    TSH  --   --   --   --  0.96 1.30      Pneumonia Vaccine:Adults age 65+ who have not received previous Pneumovax (PPSV23) or PCV13 as an adult: Should first be given PCV13 AND then should be given PPSV23 6-12 months after PCV13  Mammogram Screening: Mammogram Screening: Recommended mammography every 1-2 years with patient discussion and risk factor consideration  Any new diagnosis of family breast, ovarian, or bowel cancer? No    FHS-7: No flowsheet data found.    Mammogram Screening: Recommended mammography every 1-2 years with patient discussion and risk factor consideration  Pertinent mammograms are reviewed under the imaging tab.    Review of Systems   Constitutional: Negative for chills and fever.   HENT: Negative for congestion, ear pain, hearing loss and sore throat.    Eyes: Negative for pain and visual disturbance.   Respiratory: Negative for cough and shortness of breath.    Cardiovascular: Negative for chest pain, palpitations and peripheral edema.   Gastrointestinal: Negative for abdominal pain, constipation, diarrhea, heartburn, hematochezia and nausea.   Breasts:  Negative for tenderness, breast mass and discharge.   Genitourinary: Positive for frequency and urgency. Negative for dysuria, genital sores, hematuria, pelvic pain, vaginal bleeding and vaginal discharge.   Musculoskeletal: Negative for arthralgias, joint swelling and myalgias.   Skin: Negative for rash.   Neurological: Negative for dizziness, weakness, headaches and paresthesias.   Psychiatric/Behavioral: Negative for mood changes. The patient is not nervous/anxious.      OBJECTIVE:   BP (!) 152/90 (BP Location: Right arm, Patient Position: Sitting, Cuff Size: Adult Regular)   Pulse 86   Temp 97.7  F (36.5  C) (Oral)   Resp 20   Ht 1.6 m (5' 3\")   Wt 72.5 kg (159 lb 14.4 oz)   LMP 10/22/2008 (Exact Date)   BMI 28.33 kg/m   Estimated body mass index is 28.33 kg/m  as calculated from the " "following:    Height as of this encounter: 1.6 m (5' 3\").    Weight as of this encounter: 72.5 kg (159 lb 14.4 oz).  Physical Exam  GENERAL APPEARANCE: healthy, alert and no distress  EYES: Eyes grossly normal to inspection, PERRL and conjunctivae and sclerae normal  HENT: ear canals and TM's normal, nose and mouth without ulcers or lesions, oropharynx clear and oral mucous membranes moist  NECK: no adenopathy, no asymmetry, masses, or scars and thyroid normal to palpation  RESP: lungs clear to auscultation - no rales, rhonchi or wheezes  CV: regular rate and rhythm, normal S1 S2, no S3 or S4, no murmur, click or rub, no peripheral edema and peripheral pulses strong  ABDOMEN: soft, nontender, no hepatosplenomegaly, no masses and bowel sounds normal  MS: no musculoskeletal defects are noted and gait is age appropriate without ataxia  SKIN: no suspicious lesions or rashes  NEURO: Normal strength and tone, sensory exam grossly normal, mentation intact and speech normal  PSYCH: mentation appears normal and affect normal/bright    Diagnostic Test Results:  Labs reviewed in Epic    ASSESSMENT / PLAN:       ICD-10-CM    1. Medicare welcome exam  Z00.00 Lipid panel reflex to direct LDL Non-fasting     Comprehensive metabolic panel     CBC with platelets   2. Hypercholesterolemia  E78.00 simvastatin (ZOCOR) 20 MG tablet   3. Benign essential hypertension  I10 amLODIPine-benazepril (LOTREL) 10-40 MG capsule   4. Trigeminal neuralgia  G50.0    5. Visit for screening mammogram  Z12.31 MA Screening Digital Bilateral   6. Need for vaccination  Z23 PNEUMOCOCCAL VACCINE,ADULT,SQ OR IM     Will try oxcarbazpine which she has at home and see if works better than carbazepine, can send message to me if helping.  May consider increasing gabapentin to 300 mg hs.    Patient has been advised of split billing requirements and indicates understanding: Yes  COUNSELING:  Reviewed preventive health counseling, as reflected in patient " "instructions    Estimated body mass index is 28.33 kg/m  as calculated from the following:    Height as of this encounter: 1.6 m (5' 3\").    Weight as of this encounter: 72.5 kg (159 lb 14.4 oz).    Weight management plan: Discussed healthy diet and exercise guidelines    She reports that she has never smoked. She has never used smokeless tobacco.      Appropriate preventive services were discussed with this patient, including applicable screening as appropriate for cardiovascular disease, diabetes, osteopenia/osteoporosis, and glaucoma.  As appropriate for age/gender, discussed screening for colorectal cancer, prostate cancer, breast cancer, and cervical cancer. Checklist reviewing preventive services available has been given to the patient.    Reviewed patients plan of care and provided an AVS. The Basic Care Plan (routine screening as documented in Health Maintenance) for Nu meets the Care Plan requirement. This Care Plan has been established and reviewed with the Patient.    Counseling Resources:  ATP IV Guidelines  Pooled Cohorts Equation Calculator  Breast Cancer Risk Calculator  Breast Cancer: Medication to Reduce Risk  FRAX Risk Assessment  ICSI Preventive Guidelines  Dietary Guidelines for Americans, 2010  USDA's MyPlate  ASA Prophylaxis  Lung CA Screening    Samina Bone PA-C  [facility]    Identified Health Risks:  "

## 2021-09-07 NOTE — LETTER
September 9, 2021      Nu Taylor  [id number] [facility] [address]        Dear ,    We are writing to inform you of your test results.     Your total cholesterol is HIGH (>200), - LDL (bad cholesterol) is HIGH (<130).  I'd like to change your cholesterol medication to lipitor, which  I've sent to your pharmacy.  Everything else looks good      Resulted Orders   Lipid panel reflex to direct LDL Non-fasting   Result Value Ref Range    Cholesterol 261 (H) <200 mg/dL      Comment:      Age 0-19 years  Desirable: <170 mg/dL  Borderline high:  170-199 mg/dl  High:            >199 mg/dl    Age 20 years and older  Desirable: <200 mg/dL    Triglycerides 100 <150 mg/dL      Comment:      0-9 years:  Normal:    Less than 75 mg/dL  Borderline high:  75-99 mg/dL  High:             Greater than or equal to 100 mg/dL    0-19 years:  Normal:    Less than 90 mg/dL  Borderline high:   mg/dL  High:             Greater than or equal to 130 mg/dL    20 years and older:  Normal:    Less than 150 mg/dL  Borderline high:  150-199 mg/dL  High:             200-499 mg/dL  Very high:   Greater than or equal to 500 mg/dL    Direct Measure HDL 79 >=50 mg/dL      Comment:      0-19 years:       Greater than or equal to 45 mg/dL   Low: Less than 40 mg/dL   Borderline low: 40-44 mg/dL     20 years and older:   Female: Greater than or equal to 50 mg/dL   Male:   Greater than or equal to 40 mg/dL         LDL Cholesterol Calculated 162 (H) <=100 mg/dL      Comment:      Age 0-19 years:  Desirable: 0-110 mg/dL   Borderline high: 110-129 mg/dL   High: >= 130 mg/dL    Age 20 years and older:  Desirable: <100mg/dL  Above desirable: 100-129 mg/dL   Borderline high: 130-159 mg/dL   High: 160-189 mg/dL   Very high: >= 190 mg/dL    Non HDL Cholesterol 182 (H) <130 mg/dL      Comment:      0-19 years:  Desirable:          Less than 120 mg/dL  Borderline high:   120-144 mg/dL  High:                   Greater than or equal to 145  mg/dL    20 years and older:  Desirable:          130 mg/dL  Above Desirable: 130-159 mg/dL  Borderline high:   160-189 mg/dL  High:               190-219 mg/dL  Very high:     Greater than or equal to 220 mg/dL   Comprehensive metabolic panel   Result Value Ref Range    Sodium 138 133 - 144 mmol/L    Potassium 3.7 3.4 - 5.3 mmol/L    Chloride 106 94 - 109 mmol/L    Carbon Dioxide (CO2) 27 20 - 32 mmol/L    Anion Gap 5 3 - 14 mmol/L    Urea Nitrogen 10 7 - 30 mg/dL    Creatinine 0.63 0.52 - 1.04 mg/dL    Calcium 8.8 8.5 - 10.1 mg/dL    Glucose 88 70 - 99 mg/dL    Alkaline Phosphatase 93 40 - 150 U/L    AST 15 0 - 45 U/L    ALT 24 0 - 50 U/L    Protein Total 7.8 6.8 - 8.8 g/dL    Albumin 3.9 3.4 - 5.0 g/dL    Bilirubin Total 0.3 0.2 - 1.3 mg/dL    GFR Estimate >90 >60 mL/min/1.73m2      Comment:      As of July 11, 2021, eGFR is calculated by the CKD-EPI creatinine equation, without race adjustment. eGFR can be influenced by muscle mass, exercise, and diet. The reported eGFR is an estimation only and is only applicable if the renal function is stable.   CBC with platelets   Result Value Ref Range    WBC Count 5.5 4.0 - 11.0 10e3/uL    RBC Count 4.11 3.80 - 5.20 10e6/uL    Hemoglobin 12.9 11.7 - 15.7 g/dL    Hematocrit 38.6 35.0 - 47.0 %    MCV 94 78 - 100 fL    MCH 31.4 26.5 - 33.0 pg    MCHC 33.4 31.5 - 36.5 g/dL    RDW 12.1 10.0 - 15.0 %    Platelet Count 327 150 - 450 10e3/uL       If you have any questions or concerns, please call the clinic at the number listed above.       Sincerely,      Samina Bone PA-C

## 2021-09-07 NOTE — PATIENT INSTRUCTIONS
Patient Education   Personalized Prevention Plan  You are due for the preventive services outlined below.  Your care team is available to assist you in scheduling these services.  If you have already completed any of these items, please share that information with your care team to update in your medical record.  Health Maintenance Due   Topic Date Due     FALL RISK ASSESSMENT  Never done     Flu Vaccine (1) 09/01/2021     Pneumococcal Vaccine (1 of 1 - PPSV23) 08/28/2021     Annual Wellness Visit  09/09/2021     Cholesterol Lab  09/09/2021

## 2021-09-08 LAB
ALBUMIN SERPL-MCNC: 3.9 G/DL (ref 3.4–5)
ALP SERPL-CCNC: 93 U/L (ref 40–150)
ALT SERPL W P-5'-P-CCNC: 24 U/L (ref 0–50)
ANION GAP SERPL CALCULATED.3IONS-SCNC: 5 MMOL/L (ref 3–14)
AST SERPL W P-5'-P-CCNC: 15 U/L (ref 0–45)
BILIRUB SERPL-MCNC: 0.3 MG/DL (ref 0.2–1.3)
BUN SERPL-MCNC: 10 MG/DL (ref 7–30)
CALCIUM SERPL-MCNC: 8.8 MG/DL (ref 8.5–10.1)
CHLORIDE BLD-SCNC: 106 MMOL/L (ref 94–109)
CHOLEST SERPL-MCNC: 261 MG/DL
CO2 SERPL-SCNC: 27 MMOL/L (ref 20–32)
CREAT SERPL-MCNC: 0.63 MG/DL (ref 0.52–1.04)
GFR SERPL CREATININE-BSD FRML MDRD: >90 ML/MIN/1.73M2
GLUCOSE BLD-MCNC: 88 MG/DL (ref 70–99)
HDLC SERPL-MCNC: 79 MG/DL
LDLC SERPL CALC-MCNC: 162 MG/DL
NONHDLC SERPL-MCNC: 182 MG/DL
POTASSIUM BLD-SCNC: 3.7 MMOL/L (ref 3.4–5.3)
PROT SERPL-MCNC: 7.8 G/DL (ref 6.8–8.8)
SODIUM SERPL-SCNC: 138 MMOL/L (ref 133–144)
TRIGL SERPL-MCNC: 100 MG/DL

## 2021-09-09 ENCOUNTER — TELEPHONE (OUTPATIENT)
Dept: OBGYN | Facility: CLINIC | Age: 66
End: 2021-09-09

## 2021-09-09 ENCOUNTER — TELEPHONE (OUTPATIENT)
Dept: FAMILY MEDICINE | Facility: CLINIC | Age: 66
End: 2021-09-09

## 2021-09-09 DIAGNOSIS — N39.46 MIXED STRESS AND URGE URINARY INCONTINENCE: ICD-10-CM

## 2021-09-09 DIAGNOSIS — G50.0 TRIGEMINAL NEURALGIA: Primary | ICD-10-CM

## 2021-09-09 RX ORDER — MIRABEGRON 50 MG/1
50 TABLET, EXTENDED RELEASE ORAL DAILY
Qty: 45 TABLET | Refills: 0 | Status: SHIPPED | OUTPATIENT
Start: 2021-09-09 | End: 2022-04-25

## 2021-09-09 RX ORDER — ATORVASTATIN CALCIUM 20 MG/1
20 TABLET, FILM COATED ORAL DAILY
Qty: 90 TABLET | Refills: 3 | Status: SHIPPED | OUTPATIENT
Start: 2021-09-09 | End: 2022-02-25

## 2021-09-09 NOTE — RESULT ENCOUNTER NOTE
Please generate lab letter with comments below:     Your total cholesterol is HIGH (>200), - LDL (bad cholesterol) is HIGH (<130).  I'd like to change your cholesterol medication to lipitor, which  I've sent to your pharmacy.  Everything else looks good.

## 2021-09-09 NOTE — TELEPHONE ENCOUNTER
----- Message from Samina Bone PA-C sent at 9/9/2021  4:16 PM CDT -----  Does she want to try the 300 mg gabapentin?  She can take it up to tid.  Did the try the oxcarbamezepine?  ----- Message -----  From: Jose De RN  Sent: 9/9/2021  10:53 AM CDT  To: Samina Bone PA-C    Called and spoke with patient. Relayed provider result note and information regarding prescription. Patient verbalized understanding and will  medication today. Patient notes that her trigeminal nerve pain has worsened. Patient is taking carbamazepine as prescribed, but also reports taking 8 capsules of gabapentin daily for past week. Routing to provider to review and advise.     Jose FRANCIS RN

## 2021-09-09 NOTE — TELEPHONE ENCOUNTER
----- Message from aSmina Bone PA-C sent at 9/9/2021  4:16 PM CDT -----  Does she want to try the 300 mg gabapentin?  She can take it up to tid.  Did the try the oxcarbamezepine?  ----- Message -----  From: Jose De RN  Sent: 9/9/2021  10:53 AM CDT  To: Samina Bone PA-C    Called and spoke with patient. Relayed provider result note and information regarding prescription. Patient verbalized understanding and will  medication today. Patient notes that her trigeminal nerve pain has worsened. Patient is taking carbamazepine as prescribed, but also reports taking 8 capsules of gabapentin daily for past week. Routing to provider to review and advise.     Jose FRANCIS RN

## 2021-09-09 NOTE — TELEPHONE ENCOUNTER
Error, previous message duplicated 3x. Attempted to reach patient, LVMTCB. Need to inquire if patient is wanting to try 300mg gabapentin. Need to also inquire if patient tried the oxcarbamazepine. May have been confusion between carbamazepine and oxcarbamazepine in previous phone call. Need to clarify.     Jose FRANCIS RN

## 2021-09-09 NOTE — TELEPHONE ENCOUNTER
Pt would like myrbetriq refilled.  Working well, some constipation, advised colace.    Has f/u appt next week but will run out prior.     Filled    Annalisa JC R.N.

## 2021-09-10 RX ORDER — GABAPENTIN 100 MG/1
300 CAPSULE ORAL 3 TIMES DAILY
Qty: 270 CAPSULE | Refills: 0 | Status: SHIPPED | OUTPATIENT
Start: 2021-09-10 | End: 2021-10-11

## 2021-09-10 NOTE — TELEPHONE ENCOUNTER
Patient calling back.  States she is NOT confused.    Patient states Oxcarbarbamezapine did not work and only lasts 9 hours.  Regarding Gabapentin very unsure what she wants to do.  States will take whatever Dr. Mccain wants her to take.  Just wants a call with what she is to take.  States does need refill soon.  T'd up Gabapentin 300 mg 3 times a day.  Please send RX if appropriate and route to inform patient.  Danelle Delarosa RN

## 2021-09-10 NOTE — TELEPHONE ENCOUNTER
Let's have her increase the gabapentin to 300 mg tid, will send prescription, she can take 3 of her 100s until they are gone to use them up.

## 2021-09-27 ENCOUNTER — OFFICE VISIT (OUTPATIENT)
Dept: OBGYN | Facility: CLINIC | Age: 66
End: 2021-09-27
Payer: MEDICARE

## 2021-09-27 VITALS
WEIGHT: 162 LBS | SYSTOLIC BLOOD PRESSURE: 130 MMHG | DIASTOLIC BLOOD PRESSURE: 82 MMHG | HEIGHT: 63 IN | BODY MASS INDEX: 28.7 KG/M2

## 2021-09-27 DIAGNOSIS — N36.42 INTRINSIC SPHINCTER DEFICIENCY: ICD-10-CM

## 2021-09-27 DIAGNOSIS — N39.46 MIXED STRESS AND URGE URINARY INCONTINENCE: ICD-10-CM

## 2021-09-27 DIAGNOSIS — N81.11 CYSTOCELE, MIDLINE: Primary | ICD-10-CM

## 2021-09-27 PROCEDURE — 99214 OFFICE O/P EST MOD 30 MIN: CPT | Performed by: OBSTETRICS & GYNECOLOGY

## 2021-09-27 ASSESSMENT — MIFFLIN-ST. JEOR: SCORE: 1248.96

## 2021-09-27 NOTE — NURSING NOTE
"Chief Complaint   Patient presents with     Medication Follow-up       Initial /82   Ht 1.6 m (5' 3\")   Wt 73.5 kg (162 lb)   LMP 10/22/2008 (Exact Date)   BMI 28.70 kg/m   Estimated body mass index is 28.7 kg/m  as calculated from the following:    Height as of this encounter: 1.6 m (5' 3\").    Weight as of this encounter: 73.5 kg (162 lb).  BP completed using cuff size: regular    Questioned patient about current smoking habits.  Pt. has never smoked.          The following HM Due: NONE      The following patient reported/Care Every where data was sent to:  P ABSTRACT QUALITY INITIATIVES [[id number]]  Shey Grover LPN             "

## 2021-09-28 NOTE — PROGRESS NOTES
The patient is a. 66 year old  white female  the largest 9 pounds 2 ounces, this was her first, she states this was a hard delivery and was forceps assisted, the rest were vaginal deliveries with one  delivery that did not survive, tubal sterilization, last menstrual period approximately age 49 for contraception, not on HRT with no vaginal bleeding since whom I am asked to see by RADHA Maldonado  for evaluation of a symptomatic midline cystocele that the patient states she first noticed in approximately 2021.  The patient has pelvic floor pressure and incomplete emptying but no other bladder symptoms.  Patient states that she has a history of constipation and oftentimes uses a finger vaginally to aid in expelling of stool.  The patient underwent a SPARC procedure by Dr. Molina in  and states that it worked well for the first 2 years.  After that time she had trouble with urgency and frequency is been treated with Sanctura oxybutynin and Vesicare.  The patient denies any leakage of urine with laughing lifting coughing or straining but says the urgency and frequency are bothersome to her.  Patient does wear protection.  Pregnancy history includes forcep assisted vaginal delivery with her first followed by vaginal deliveries,, no other known history of bladder or bowel injury. Patient complains of urinary leakage with urgency frequency but no symptoms directly attributable to stress incontinence.  At the time of her initial evaluation on 2021 the patient had a grade 2-3 midline cystocele, greater than 30 degrees of UV angle hypermobility and grade 1 cervical descent.  See the office visit notes from 2021 and the urodynamic study as well as visits of 2021 and 5/10/2021 for further details.  At the time of the urodynamic study on 2021 I noted mixed urinary incontinence with a component of urgency frequency and urge incontinence as well as urinary stress incontinence and  "intrinsic sphincter deficiency.  A trial of Myrbetriq was started at that visit.  On 5/10/2021 the patient was also fitted with a #3 diaphragm type pessary which resulted in good reduction of her cystocele.  The patient demonstrated proficiency with insertion and removal.  Patient states that she also has trigeminal neuralgia and has had extensive therapy and treatment for this.  At present she is also bothered with intense constipation in part due to the medications that she uses for her trigeminal neuralgia.  She notices that when she is very constipated her cystocele becomes more prominent.  She presents today to discuss her options    Past Medical History:   Diagnosis Date     Allergic rhinitis      HTN (hypertension)      Hyperlipidemia 08/06/2008     Trigeminal neuralgia      Current Outpatient Medications   Medication     amLODIPine-benazepril (LOTREL) 10-40 MG capsule     atorvastatin (LIPITOR) 20 MG tablet     carBAMazepine (TEGRETOL) 200 MG tablet     gabapentin (NEURONTIN) 100 MG capsule     mirabegron (MYRBETRIQ) 50 MG 24 hr tablet     Multiple Vitamins-Minerals (CENTRUM PO)     oxyCODONE (ROXICODONE) 5 MG tablet     No current facility-administered medications for this visit.     Past Surgical History:   Procedure Laterality Date     C section with Tubal ligation       LAPAROSCOPIC SUSPENSION BLADDER NECK         /82   Ht 1.6 m (5' 3\")   Wt 73.5 kg (162 lb)   LMP 10/22/2008 (Exact Date)   BMI 28.70 kg/m       Constitutional: healthy, alert and no distress  Genitourinary: Deferred    (N81.11) Cystocele, midline  (primary encounter diagnosis)  Comment: I had a lengthy discussion regarding her cystocele and her trigeminal neuralgia her constipation and difficulties with pelvic floor function.  We discussed treatment options including an expectant wait-and-see approach, continued use of a pessary or potential surgical repair.  I discussed getting her constipation under better control continuing " with the Myrbetriq and then reassessing in 2 weeks.  The patient would like to do this  Plan: I will have her begin using MiraLAX 1 capful daily to ensure soft stools that are easy to pass without constipation.      (N39.46) Mixed stress and urge urinary incontinence  Comment: She will continue with Myrbetriq and will reassess when I see her in 2 weeks.  Is my hope that with her having less discomfort with defecation and improved pain control of her trigeminal neuralgia that her bladder and pelvic floor assistant will come less bothersome and more responsive to conservative therapy.  Plan: If conservative therapy does not provide adequate relief may consider surgical intervention    (N36.42) Intrinsic sphincter deficiency  Comment: Written plan was given to the patient.  We discussed ISD at length and the use of bulking agents and the pros and cons of this.  It would be my hope that through the use of the pessary and a beta 3 agonist as well as a bowel regimen that she could achieve acceptable bowel and bladder function and control.  A detailed plan was given.  25 minutes were spent reviewing the records discussing the situation and formulating a plan  Plan: As above

## 2021-10-05 ENCOUNTER — VIRTUAL VISIT (OUTPATIENT)
Dept: NEUROSURGERY | Facility: CLINIC | Age: 66
End: 2021-10-05
Payer: MEDICARE

## 2021-10-05 DIAGNOSIS — G50.0 TRIGEMINAL NEURALGIA: Primary | ICD-10-CM

## 2021-10-05 PROCEDURE — 99207 PR NO DOCUMENTATION ON VISIT: CPT | Mod: 95 | Performed by: NEUROLOGICAL SURGERY

## 2021-10-05 NOTE — PROGRESS NOTES
Service Date: 10/05/2021    I had the pleasure to talk to Nu today and her , Thomas.      Briefly, Nu has a history of right-sided trigeminal neuralgia.  She is currently on Tegretol and Neurontin for her pain.  Since we last saw her, roughly 6 months ago, she seems to indicate that her pain is better on the medical regimen right now.  It certainly does affect her at times, but I do not get the sense that it is significantly impacting her quality of life.    We did talk about treatment options beyond medication.  We talked about ablative procedures including radiosurgery, balloon rhizotomy and radiofrequency rhizotomy.  We also talked about the microvascular decompression.  We talked about pros and cons and risks of all of these different procedures.    I reviewed her MRI from 2019.  On that, you can see that there is some compression of the trigeminal nerve on the right.    Based on her MRI, if she was to proceed with a surgical intervention, I would recommend a microvascular decompression up front.  That said, I do not think that she is ready for surgery right now.  It was very difficult to get a clear picture, however, of where she is at with her medication, whether or not she needs surgery right now.      Ultimately, however, I think it is best that she continue with her medication for now and think about surgery.  I have suggested that she come back in 3 months; however, [place].  Her preference would be to come back some time next May.  I think this is perfectly suitable.  We will plan to see her when she returns to town in May.    Milo Muñoz MD        D: 10/05/2021   T: 10/05/2021   MT: BACILIO    Name:     NU BARKER  MRN:      [id number]        Account:      [id number]   :      1955           Service Date: 10/05/2021       Document: [id number]

## 2021-10-05 NOTE — PROGRESS NOTES
Nu is a 66 year old who is being evaluated via a billable telephone visit.      What phone number would you like to be contacted at? [phone]   How would you like to obtain your AVS? Mail a copy  Phone call duration:  minutes

## 2021-10-05 NOTE — LETTER
10/5/2021       RE: Nu Taylor  [address] [facility] [address]     Dear Colleague,    Thank you for referring your patient, Nu Taylor, to the [facility] [place] [facility]. Please see a copy of my visit note below.    Service Date: 10/05/2021    I had the pleasure to talk to Nu today and her , Thomas.      Briefly, Nu has a history of right-sided trigeminal neuralgia.  She is currently on Tegretol and Neurontin for her pain.  Since we last saw her, roughly 6 months ago, she seems to indicate that her pain is better on the medical regimen right now.  It certainly does affect her at times, but I do not get the sense that it is significantly impacting her quality of life.    We did talk about treatment options beyond medication.  We talked about ablative procedures including radiosurgery, balloon rhizotomy and radiofrequency rhizotomy.  We also talked about the microvascular decompression.  We talked about pros and cons and risks of all of these different procedures.    I reviewed her MRI from 08/06/2019.  On that, you can see that there is some compression of the trigeminal nerve on the right.    Based on her MRI, if she was to proceed with a surgical intervention, I would recommend a microvascular decompression up front.  That said, I do not think that she is ready for surgery right now.  It was very difficult to get a clear picture, however, of where she is at with her medication, whether or not she needs surgery right now.      Ultimately, however, I think it is best that she continue with her medication for now and think about surgery.  I have suggested that she come back in 3 months; however, [place] at that [place].  Her preference would be to come back some time next May.  I think this is perfectly suitable.  We will plan to see her when she returns to town in May.    Milo  YOSHI Muñoz MD

## 2021-10-06 NOTE — PATIENT INSTRUCTIONS
Follow up with Dr Muñoz, May 2022 when patient returns from her [place] residence.  No imaging needed.      Call Mara RN Care Coordinator  for questions/concerns     Thank you for using [facility]

## 2021-10-11 ENCOUNTER — TELEPHONE (OUTPATIENT)
Dept: FAMILY MEDICINE | Facility: CLINIC | Age: 66
End: 2021-10-11

## 2021-10-11 DIAGNOSIS — G50.0 TRIGEMINAL NEURALGIA: ICD-10-CM

## 2021-10-11 RX ORDER — GABAPENTIN 300 MG/1
300 CAPSULE ORAL 3 TIMES DAILY
Qty: 90 CAPSULE | Refills: 3 | Status: SHIPPED | OUTPATIENT
Start: 2021-10-11 | End: 2022-02-03

## 2021-10-11 NOTE — LETTER
October 11, 2021      Nu Taylor  [address] [place] [id number]        To Whom It May Concern,       Ms. Taylor is under my care for the treatment of Trigeminal Neuralgia.  For this condition, she takes anti seizure medications.  She is not on this medication for epilepsy and has no history of seizures.          Sincerely,         Samina Bone PA-C

## 2021-10-11 NOTE — PATIENT INSTRUCTIONS
Amy Osorio returned call at this time.  Will set up home 02 and [facility] for discharge.     Jeannine Sylvester RN  10/11/21 0847     You can reach your [facility] Care Team any time of the day by calling [phone]. This number will put you in touch with the 24 hour nurse line if the clinic is closed.    To contact your OB/GYN Station Coordinator/Surgery Scheduler please call [phone]. This is a direct number for your care team between 8 a.m. and 4 p.m. Monday through Friday.    [facility] is open for your convenience:  Monday through Friday 8 a.m. to 6 p.m.  Closed weekends and all major holidays.

## 2021-10-11 NOTE — LETTER
October 11, 2021      Nu Taylor  [address] [place] [id number]        To Whom It May Concern,          Ms. Taylor in an anti-seizure medication for a neurological pain condition, not for seizures.  She has no history of seizures.  She takes oxycodone, carbamezapine and gabapentin for this condition.        Sincerely,            Samina Bone PA-C

## 2021-10-11 NOTE — TELEPHONE ENCOUNTER
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  Goes to chiro for that.  States you have done note regarding her being on seizure med for TMJ.  States does not have seizures.      Email to [email or web address]  Attn: Dr. Hicks  Appt is tomorrow at 9:30 am    I t'd up previous message.    Patient is requesting med list be updated.  She is requesting gabapentin be sent as gabapentin 300 mg 3 times a day instead of 100 mg.  Only wants to take 1 vs 3 at a time.  States sometimes takes 4 times a day maybe once every couple of weeks.      States medications going really well right now.    FYI-Leaving at the end of the month to go south.        Danelle Delarosa RN

## 2021-10-12 NOTE — TELEPHONE ENCOUNTER
Patient calling.  States chiro did not receive letter.  Was it emailed?  Please fax to [phone].  Danelle Delarosa RN

## 2021-10-13 ENCOUNTER — TELEPHONE (OUTPATIENT)
Dept: FAMILY MEDICINE | Facility: CLINIC | Age: 66
End: 2021-10-13

## 2021-10-13 NOTE — TELEPHONE ENCOUNTER
Patient is calling requesting for us to update the letter we did on 10/11/21, she forgot that the oxycodone medicine should also be on the letter for treatment of Trigeminal Neuralgia . If type a new letter please include the following 3 medications:  Oxycodone  Gabapentin  Carbamazepine  Please fax the letter to [phone] attn:Dr. Lucia with [facility].    Lavinia Puga   [facility]  Central Scheduler

## 2021-10-13 NOTE — LETTER
October 11, 2021      Nu Taylor  [address] [address] [place] [phone]        To Whom It May Concern,       Ms. Taylor is under my care for the treatment of Trigeminal Neuralgia.  For this condition, she takes anti seizure medications.  She is not on this medication for epilepsy and has no history of seizures.  She takes oxycodone, carbamezapine and gabapentin for this condition.          Sincerely,         Samina Bone PA-C

## 2021-10-14 ENCOUNTER — OFFICE VISIT (OUTPATIENT)
Dept: OBGYN | Facility: CLINIC | Age: 66
End: 2021-10-14
Payer: MEDICARE

## 2021-10-14 ENCOUNTER — TELEPHONE (OUTPATIENT)
Dept: OBGYN | Facility: CLINIC | Age: 66
End: 2021-10-14

## 2021-10-14 VITALS — WEIGHT: 165 LBS | SYSTOLIC BLOOD PRESSURE: 150 MMHG | DIASTOLIC BLOOD PRESSURE: 82 MMHG | BODY MASS INDEX: 29.23 KG/M2

## 2021-10-14 DIAGNOSIS — K59.09 CHRONIC CONSTIPATION: Primary | ICD-10-CM

## 2021-10-14 DIAGNOSIS — N39.46 MIXED STRESS AND URGE URINARY INCONTINENCE: ICD-10-CM

## 2021-10-14 DIAGNOSIS — N89.8 VAGINAL IRRITATION: ICD-10-CM

## 2021-10-14 LAB
ALBUMIN UR-MCNC: NEGATIVE MG/DL
APPEARANCE UR: CLEAR
BILIRUB UR QL STRIP: NEGATIVE
CLUE CELLS: ABNORMAL
COLOR UR AUTO: YELLOW
GLUCOSE UR STRIP-MCNC: NEGATIVE MG/DL
HGB UR QL STRIP: NEGATIVE
KETONES UR STRIP-MCNC: NEGATIVE MG/DL
LEUKOCYTE ESTERASE UR QL STRIP: NEGATIVE
NITRATE UR QL: NEGATIVE
PH UR STRIP: 6.5 [PH] (ref 5–7)
SP GR UR STRIP: <=1.005 (ref 1–1.03)
TRICHOMONAS, WET PREP: ABNORMAL
UROBILINOGEN UR STRIP-ACNC: 0.2 E.U./DL
WBC'S/HIGH POWER FIELD, WET PREP: ABNORMAL
YEAST, WET PREP: ABNORMAL

## 2021-10-14 PROCEDURE — 87086 URINE CULTURE/COLONY COUNT: CPT | Performed by: OBSTETRICS & GYNECOLOGY

## 2021-10-14 PROCEDURE — 81003 URINALYSIS AUTO W/O SCOPE: CPT | Performed by: OBSTETRICS & GYNECOLOGY

## 2021-10-14 PROCEDURE — 87210 SMEAR WET MOUNT SALINE/INK: CPT | Performed by: OBSTETRICS & GYNECOLOGY

## 2021-10-14 PROCEDURE — 99214 OFFICE O/P EST MOD 30 MIN: CPT | Performed by: OBSTETRICS & GYNECOLOGY

## 2021-10-14 NOTE — NURSING NOTE
"Chief Complaint   Patient presents with     RECHECK       Initial BP (!) 150/82   Wt 74.8 kg (165 lb)   LMP 10/22/2008 (Exact Date)   BMI 29.23 kg/m   Estimated body mass index is 29.23 kg/m  as calculated from the following:    Height as of 21: 1.6 m (5' 3\").    Weight as of this encounter: 74.8 kg (165 lb).  BP completed using cuff size: regular    Questioned patient about current smoking habits.  Pt. has never smoked.          The following HM Due: NONE      The following patient reported/Care Every where data was sent to:  P ABSTRACT QUALITY INITIATIVES [[id number]]        Linda Holland [facility]                 "

## 2021-10-14 NOTE — PATIENT INSTRUCTIONS
You can reach your [facility] Care Team any time of the day by calling [phone]. This number will put you in touch with the 24 hour nurse line if the clinic is closed.    To contact your OB/GYN Station Coordinator/Surgery Scheduler please call [phone]. This is a direct number for your care team between 8 a.m. and 4 p.m. Monday through Friday.    [facility] is open for your convenience:  Monday through Friday 8 a.m. to 6 p.m.  Closed weekends and all major holidays.

## 2021-10-14 NOTE — TELEPHONE ENCOUNTER
Pt's insurance does not cover Linzess.  Do you want to start a PA?  Or is there an alternative to send in?    Loida Steward RN

## 2021-10-14 NOTE — PROGRESS NOTES
"The patient is a. 66 year old  white female  the largest 9 pounds 2 ounces, this was her first, she states this was a hard delivery and was forceps assisted, the rest were vaginal deliveries with one  delivery that did not survive, tubal sterilization, last menstrual period approximately age 49 for contraception, not on HRT with no vaginal bleeding since whom I am asked to see by RADHA Maldonado  for evaluation of a symptomatic midline cystocele that the patient states she first noticed in approximately 2021.  The patient has pelvic floor pressure and incomplete emptying but no other bladder symptoms.  Patient states that she has a history of constipation and oftentimes uses a finger vaginally to aid in expelling of stool.  The patient underwent a SPARC procedure by Dr. Molina in  and states that it worked well for the first 2 years.  After that time she had trouble with urgency and frequency is been treated with Sanctura oxybutynin and Vesicare.  The patient denies any leakage of urine with laughing lifting coughing or straining but says the urgency and frequency are bothersome to her.  Patient does wear protection.  See the previous office visit notes from 2021 for further details.  The patient was fitted with a #3 diaphragm pessary which resulted in good reduction of her cystocele.  The patient presents today for a follow-up.  The patient has a history of trigeminal neuralgia and is following with specialty care for this.  The patient states that the last 2 weeks have been difficult and she has had more pain and discomfort.  During the last 2 weeks she also had 2 episodes of \"bad stomach cramps \".  The discomfort seemed to be more in the right lower quadrant in the left lower quadrant but it was bilateral.  The patient did not wear her pessary for several days and noticed that her cystocele to be a little worse.  The patient did not have regular bowel movements during this " period of time.  She states that while the MiraLAX helped it certainly has not completely alleviated the concern of constipation.  She notices when she Valsalva's that the cystocele becomes more prominent and at that time she has difficulty completely emptying and has to use a finger to reduce it.  She denies any black and tarry stools or bloody stools.  She forgot to put a loop of dental floss around the pessary and had difficulty removing it and had some mild introital irritation after her  was able to remove it.  The patient and her  spend the buchanan in [place] and are leaving at the end of the month in a couple of weeks and will not be back until spring    Past Medical History:   Diagnosis Date     Allergic rhinitis      HTN (hypertension)      Hyperlipidemia 08/06/2008     Trigeminal neuralgia      Current Outpatient Medications   Medication     amLODIPine-benazepril (LOTREL) 10-40 MG capsule     atorvastatin (LIPITOR) 20 MG tablet     carBAMazepine (TEGRETOL) 200 MG tablet     gabapentin (NEURONTIN) 300 MG capsule     linaclotide (LINZESS) 145 MCG capsule     mirabegron (MYRBETRIQ) 50 MG 24 hr tablet     Multiple Vitamins-Minerals (CENTRUM PO)     oxyCODONE (ROXICODONE) 5 MG tablet     No current facility-administered medications for this visit.     Past Surgical History:   Procedure Laterality Date     C section with Tubal ligation       LAPAROSCOPIC SUSPENSION BLADDER NECK       BP (!) 150/82   Wt 74.8 kg (165 lb)   LMP 10/22/2008 (Exact Date)   BMI 29.23 kg/m    Constitutional: healthy, alert and no distress  Genitourinary: Normal external genitalia without lesions and the patient is not wearing her pessary at present.  Speculum exam reveals a grade 3 midline cystocele with Valsalva without leakage of urine.  Multiparous cervix.  I did a wet prep today that was negative.  No signs of ulceration or irritation.  Bimanual exam the uterus feels parous mobile firm with restoration of the apical  support the cystocele is significantly reduced to approximately a grade 1.  Adnexa without masses or enlargement.  Rectovaginal exam normal sphincter tone minimal grade 1 distal rectocele    (K59.09) Chronic constipation  (primary encounter diagnosis)  Comment: Today again we discussed the cystocele pelvic floor relaxation and her urinary tract symptoms and the risk benefits and alternative forms of therapy.  I reviewed her trigeminal neuralgia with my desire to not do anything that would exacerbate the pain that she has.  I feel that if she could achieve more normal bowel function with soft daily stools use the pessary that her bladder control symptoms and pelvic floor relaxation would be much improved and that this would be done without surgical intervention.  I did discuss the 2 episodes of severe abdominal pain that she had this past 2 weeks.  These may very well be related to constipation issues I discussed periodic use of Linzess to aid with this.  I discussed using this once daily only on an as-needed basis and not daily as a prescription was written.  She will use this in addition to a high-fiber diet and the MiraLAX  Plan: linaclotide (LINZESS) 145 MCG capsule        I like to see her back in 2 weeks to reassess before she goes to [place].  I did discuss surgical options with her as well and gave her a detailed written outline of our plan    (N39.46) Mixed stress and urge urinary incontinence  Comment: As above  Plan: UA reflex to Microscopic - lab collect, Urine         Culture Aerobic Bacterial - lab collect        Done    (N89.8) Vaginal irritation  Comment: Wet prep negative  Plan: Wet prep - Clinic Collect        Proceed with above plan

## 2021-10-15 LAB — BACTERIA UR CULT: NORMAL

## 2021-11-07 NOTE — TELEPHONE ENCOUNTER
I left a voicemail message for the patient to return my call.  Yes I think we should start a prior authorization for Linzess to help with her chronic constipation.  Can you please ask the prior authorization staff to begin this process  Thank you  Edmund Mccarthy MD FACOG

## 2021-11-07 NOTE — RESULT ENCOUNTER NOTE
I left a voicemail message for the patient to return my call.  Earlier the Myrbetriq had been working to improve her urinary symptoms.  I would like to visit with her to see if there is an alternative that could be used.  Regarding the introital irritation I would advise 1% hydrocortisone to be used on a twice daily basis for 10 to 14 days.  If she has not noticed an improvement please let us know.  Can you please reach out to see if you can contact the patient to relay this information to her  Thank you

## 2021-11-08 NOTE — TELEPHONE ENCOUNTER
Neela Winters Prior Auth 2 hours ago (7:38 AM)     DS    DUPLICATE REQUEST-PLEASE SEE TELEPHONE ENCOUTNER FROM 10/28/21.  If done with encounter, please close.     Thanks,   Central PA Team      Routing comment

## 2022-01-03 DIAGNOSIS — G50.0 TRIGEMINAL NEURALGIA: ICD-10-CM

## 2022-01-03 RX ORDER — CARBAMAZEPINE 200 MG/1
600 TABLET ORAL 2 TIMES DAILY
Qty: 360 TABLET | Refills: 1 | Status: SHIPPED | OUTPATIENT
Start: 2022-01-03 | End: 2022-03-29

## 2022-01-03 NOTE — TELEPHONE ENCOUNTER
Please see message below. Patient also updating on gabapentin usage. Routing refill request to provider for review/approval because:  Labs not current:  no carbamazepine level on file    Jose FRANCIS RN

## 2022-02-02 DIAGNOSIS — G50.0 TRIGEMINAL NEURALGIA: ICD-10-CM

## 2022-02-03 RX ORDER — GABAPENTIN 300 MG/1
300 CAPSULE ORAL 3 TIMES DAILY
Qty: 90 CAPSULE | Refills: 3 | Status: SHIPPED | OUTPATIENT
Start: 2022-02-03 | End: 2022-07-07

## 2022-02-25 ENCOUNTER — TELEPHONE (OUTPATIENT)
Dept: FAMILY MEDICINE | Facility: CLINIC | Age: 67
End: 2022-02-25
Payer: MEDICARE

## 2022-02-25 DIAGNOSIS — E78.00 HYPERCHOLESTEROLEMIA: ICD-10-CM

## 2022-02-25 RX ORDER — ATORVASTATIN CALCIUM 20 MG/1
20 TABLET, FILM COATED ORAL DAILY
Qty: 90 TABLET | Refills: 0 | Status: SHIPPED | OUTPATIENT
Start: 2022-02-25 | End: 2022-06-08

## 2022-02-25 NOTE — TELEPHONE ENCOUNTER
Rx last filled   atorvastatin (LIPITOR) 20 MG tablet 90 tablet 3 9/9/2021  --   Sig - Route: Take 1 tablet (20 mg) by mouth daily - Oral   Sent to pharmacy as: Atorvastatin Calcium 20 MG Oral Tablet (LIPITOR)   Class: E-Prescribe   Order: [id number]   E-Prescribing Status: Receipt confirmed by pharmacy (9/9/2021  9:29 AM CDT)     Alternative pharmacy requested. Sending refill to pharmacy. Prescription approved per [facility] Refill Protocol.  Jose FRANCIS RN

## 2022-02-25 NOTE — TELEPHONE ENCOUNTER
Patient called wanting a refill of her lipitor sent over to the pharmacy in [facility].

## 2022-03-05 ENCOUNTER — TELEPHONE (OUTPATIENT)
Dept: NEUROSURGERY | Facility: CLINIC | Age: 67
End: 2022-03-05
Payer: MEDICARE

## 2022-03-28 DIAGNOSIS — I10 BENIGN ESSENTIAL HYPERTENSION: ICD-10-CM

## 2022-03-28 RX ORDER — AMLODIPINE AND BENAZEPRIL HYDROCHLORIDE 10; 40 MG/1; MG/1
CAPSULE ORAL
Qty: 90 CAPSULE | Refills: 0 | Status: SHIPPED | OUTPATIENT
Start: 2022-03-28 | End: 2022-10-17

## 2022-03-28 NOTE — TELEPHONE ENCOUNTER
Message from MyChart:  Original authorizing provider: Maki Topete MD PhD    Jaimee Rodriguez would like a refill of the following medications:  HYDROcodone-acetaminophen (NORCO)  MG per tablet [Maki Topete MD PhD]  fentaNYL (DURAGESIC) 100 mcg/hr 72 hr patch [Maki Topete MD PhD]    Preferred pharmacy: [facility] [address], [address]    Comment:     Routing refill request to provider for review/approval because:  Labs out of range:  BP    BP Readings from Last 3 Encounters:   10/14/21 (!) 150/82   09/27/21 130/82   09/07/21 (!) 152/90        Leanne NEFF RN

## 2022-03-28 NOTE — TELEPHONE ENCOUNTER
Patient is in [place] requesting a refill of #90 amlodipine-benazepril. Pharmacy pended.  Yana Ta,

## 2022-03-29 DIAGNOSIS — G50.0 TRIGEMINAL NEURALGIA: ICD-10-CM

## 2022-03-30 RX ORDER — CARBAMAZEPINE 200 MG/1
TABLET ORAL
Qty: 540 TABLET | Refills: 1 | Status: SHIPPED | OUTPATIENT
Start: 2022-03-30 | End: 2022-07-29

## 2022-04-22 NOTE — PROGRESS NOTES
Pre-Visit Planning   Next 5 appointments (look out 90 days)    Apr 25, 2022  7:30 AM  (Arrive by 7:10 AM)  Provider Visit with Samina Bone PA-C  [facility] ([facility] ) [address] [facility] [address] [phone]        Appointment Notes for this encounter:   medication follow up    Questionnaires Reviewed/Assigned  No additional questionnaires are needed    Patient preferred phone number: [phone]    Unable to reach. Left voicemail. Advised patient to call clinic back at [phone].

## 2022-04-25 ENCOUNTER — OFFICE VISIT (OUTPATIENT)
Dept: FAMILY MEDICINE | Facility: CLINIC | Age: 67
End: 2022-04-25
Payer: MEDICARE

## 2022-04-25 VITALS
TEMPERATURE: 99 F | RESPIRATION RATE: 16 BRPM | HEIGHT: 63 IN | WEIGHT: 172.5 LBS | HEART RATE: 94 BPM | SYSTOLIC BLOOD PRESSURE: 130 MMHG | OXYGEN SATURATION: 98 % | DIASTOLIC BLOOD PRESSURE: 82 MMHG | BODY MASS INDEX: 30.56 KG/M2

## 2022-04-25 DIAGNOSIS — M75.121 COMPLETE TEAR OF RIGHT ROTATOR CUFF, UNSPECIFIED WHETHER TRAUMATIC: ICD-10-CM

## 2022-04-25 DIAGNOSIS — N39.46 MIXED STRESS AND URGE URINARY INCONTINENCE: Primary | ICD-10-CM

## 2022-04-25 PROCEDURE — 99214 OFFICE O/P EST MOD 30 MIN: CPT | Performed by: PHYSICIAN ASSISTANT

## 2022-04-25 RX ORDER — NAPROXEN 500 MG/1
500 TABLET ORAL 2 TIMES DAILY WITH MEALS
Qty: 14 TABLET | Refills: 0 | Status: SHIPPED | OUTPATIENT
Start: 2022-04-25 | End: 2022-05-03

## 2022-04-25 RX ORDER — CYCLOBENZAPRINE HCL 5 MG
5-10 TABLET ORAL AT BEDTIME
Qty: 10 TABLET | Refills: 0 | Status: SHIPPED | OUTPATIENT
Start: 2022-04-25 | End: 2022-06-20

## 2022-04-25 ASSESSMENT — ANXIETY QUESTIONNAIRES
6. BECOMING EASILY ANNOYED OR IRRITABLE: NOT AT ALL
GAD7 TOTAL SCORE: 0
7. FEELING AFRAID AS IF SOMETHING AWFUL MIGHT HAPPEN: NOT AT ALL
GAD7 TOTAL SCORE: 0
7. FEELING AFRAID AS IF SOMETHING AWFUL MIGHT HAPPEN: NOT AT ALL
4. TROUBLE RELAXING: NOT AT ALL
2. NOT BEING ABLE TO STOP OR CONTROL WORRYING: NOT AT ALL
1. FEELING NERVOUS, ANXIOUS, OR ON EDGE: NOT AT ALL
5. BEING SO RESTLESS THAT IT IS HARD TO SIT STILL: NOT AT ALL
GAD7 TOTAL SCORE: 0
3. WORRYING TOO MUCH ABOUT DIFFERENT THINGS: NOT AT ALL

## 2022-04-25 NOTE — PROGRESS NOTES
{PROVIDER CHARTING PREFERENCE:682014}    Subjective   Nu is a 66 year old who presents for the following health issues {ACCOMPANIED BY STATEMENT (Optional):342432}    HPI     {SUPERLIST (Optional):770021}  {additonal problems for provider to add (Optional):042458}    Review of Systems   {ROS COMP (Optional):270058}      Objective    LMP 10/22/2008 (Exact Date)   There is no height or weight on file to calculate BMI.  Physical Exam   {Exam List (Optional):925645}    {Diagnostic Test Results (Optional):934577}    {AMBULATORY ATTESTATION (Optional):686500}

## 2022-04-25 NOTE — PROGRESS NOTES
"  Assessment & Plan     Mixed stress and urge urinary incontinence  - Adult Urology Referral; Future    Complete tear of right rotator cuff, unspecified whether traumatic  - REVIEW OF HEALTH MAINTENANCE PROTOCOL ORDERS  - naproxen (NAPROSYN) 500 MG tablet; Take 1 tablet (500 mg) by mouth 2 times daily (with meals)  - cyclobenzaprine (FLEXERIL) 5 MG tablet; Take 1-2 tablets (5-10 mg) by mouth At Bedtime  Will need PT if not continuing to improve, can send MyC message to request this.         BMI:   Estimated body mass index is 30.56 kg/m  as calculated from the following:    Height as of this encounter: 1.6 m (5' 3\").    Weight as of this encounter: 78.2 kg (172 lb 8 oz).           Return in about 1 week (around 5/2/2022) for Recheck if not improving, MyC Message.    Samina Bone PA-C  [facility]    Enrique Judge is a 66 year old who presents for the following health issues    History of Present Illness       Mental Health Follow-up:                    Today's PHQ-9         PHQ-9 Total Score: 0  PHQ-9 Q9 Thoughts of better off dead/self-harm past 2 weeks :   (P) Not at all    How difficult have these problems made it for you to do your work, take care of things at home, or get along with other people: Not difficult at all    Today's MAURO-7 Score: 0    Reason for visit:  Can not bend my arm because is my shoulder, and bladder.  Symptoms include:  My shoulder hurts when I lay or sit not standing.  Mybladder, I am using the restroom every half hour.  Symptom intensity:  Severe  Symptom progression:  Staying the same  Had these symptoms before:  Yes  Has tried/received treatment for these symptoms:  Yes  Previous treatment was successful:  No  What makes it worse:  Using the arm    She eats 2-3 servings of fruits and vegetables daily.She consumes 1 sweetened beverage(s) daily.She exercises with enough effort to increase her heart rate 30 to 60 minutes per day.  She exercises " "with enough effort to increase her heart rate 6 days per week.   She is taking medications regularly.     Additional complaints: None  HPI additional notes: Nu presents today with   Chief Complaint   Patient presents with     Medication Follow-up     Right shoulder pain, hurts when moves too quickly.  Was given flexeril and naproxen in [place].  Pt is right handed, history of tear in 2017, pain went away on its own.         Review of Systems   Constitutional, HEENT, cardiovascular, pulmonary, gi and gu systems are negative, except as otherwise noted.      Objective    /82   Pulse 94   Temp 99  F (37.2  C) (Oral)   Resp 16   Ht 1.6 m (5' 3\")   Wt 78.2 kg (172 lb 8 oz)   LMP 10/22/2008 (Exact Date)   SpO2 98%   BMI 30.56 kg/m    Body mass index is 30.56 kg/m .  Physical Exam     Physical Exam   GENERAL: healthy, alert, in no acute distress  HENT: Mucous mebranes moist.  ORTHO: Shoulder exam: Inspection: no swelling, bruising, discoloration, or obvious deformity or asymmetry  Tender: anterior capsule  Non-tender: SC joint and AC joint  Range of Motion  Active:forward flexion normal, abduction  135 degrees, external rotation  180 degrees, internal rotation  120  Passive: not examined  Strength: forward flexion 4/5, abduction  4-/5, internal rotation  4-/5, external rotation  4-/5 and bicep full  Special tests:  Positive: none  SKIN: no suspicious lesions, no rashes  PSYCH: Alert and oriented times 3;  Able to articulate logical thoughts. Affect is normal.   "

## 2022-04-26 ASSESSMENT — PATIENT HEALTH QUESTIONNAIRE - PHQ9: SUM OF ALL RESPONSES TO PHQ QUESTIONS 1-9: 0

## 2022-04-26 ASSESSMENT — ANXIETY QUESTIONNAIRES: GAD7 TOTAL SCORE: 0

## 2022-05-01 DIAGNOSIS — M75.121 COMPLETE TEAR OF RIGHT ROTATOR CUFF, UNSPECIFIED WHETHER TRAUMATIC: ICD-10-CM

## 2022-05-02 NOTE — TELEPHONE ENCOUNTER
Ongoing rx? Routing refill request to provider for review/approval because:  Fail: Patient is age 6-64 years    Jose FRANCIS RN

## 2022-05-03 RX ORDER — NAPROXEN 500 MG/1
TABLET ORAL
Qty: 14 TABLET | Refills: 0 | Status: SHIPPED | OUTPATIENT
Start: 2022-05-03 | End: 2022-05-10

## 2022-05-04 ENCOUNTER — VIRTUAL VISIT (OUTPATIENT)
Dept: NEUROSURGERY | Facility: CLINIC | Age: 67
End: 2022-05-04
Payer: MEDICARE

## 2022-05-04 DIAGNOSIS — G50.0 TRIGEMINAL NEURALGIA: Primary | ICD-10-CM

## 2022-05-04 PROCEDURE — 99443 PR PHYSICIAN TELEPHONE EVALUATION 21-30 MIN: CPT | Mod: 95 | Performed by: NURSE PRACTITIONER

## 2022-05-04 NOTE — PATIENT INSTRUCTIONS
Follow-up with Cecy Jernigan CNP in 2 months.     2. Contact the clinic sooner if any additional questions.     Call Mara ROMERO  Neurosurgery Care Coordinator with questions/concerns     Thank you for using [facility]

## 2022-05-04 NOTE — NURSING NOTE
Chief Complaint   Patient presents with     Follow Up     Follow up appt. Patient states since back from [place] the weather is making her increase on her meds. --When she talks a lot her face goes numb.

## 2022-05-04 NOTE — LETTER
2022       RE: Nu Taylor  [id number] [facility] [place] [address]     Dear Colleague,    Thank you for referring your patient, Nu Taylor, to the [facility] [place] at [facility]. Please see a copy of my visit note below.      [facility]  Department of Neurosurgery      Name: Nu Taylor  MRN: [id number]  Age: 66 year old  : 1955  Referring provider: Milo Muñoz  2022      Chief Complaint:   Right trigeminal neuralgia    History of Present Illness:   Nu Taylor is a 66 year old female with a history of right V2-V3 trigeminal neuralgia who is seen today for a follow-up.  Most recently seen by Dr. Muñoz in our clinic on 10/5/2021.  Surgical options were discussed at that time but ultimately decided to continue medications and to follow-up in 3 months.    Today I had a phone visit with the patient.  Per patient, she lived in [place] for the past 6 months and had significantly reduced pain from trigeminal neuralgia.  Prior to leaving to [place], she was taking gabapentin 300 mg 3 times a day and carbamazepine 600 mg twice a day.  She was able to wean it down to carbamazepine 600 mg once a day and gabapentin 300 mg once a day.  She noticed less fatigue on reduced doses of these medications.  She returned to [place] 2 weeks ago and is starting to notice slightly increased frequency of trigeminal neuralgia.    Per patient she experiences pain on the RIGHT side of her face, below her eye to below her chin.  This is triggered by brushing, and eating.      Review of Systems:   Pertinent items are noted in HPI or as in patient entered ROS below, remainder of complete ROS is negative.   No flowsheet data found.     Physical Exam:   N/A (telephone visit)    Imaging:  No new imaging    Assessment:  Right V2-V3 trigeminal neuralgia    Plan:  We will continue her current medications for now.  If  patient has more frequent episodes from trigeminal neuralgia, she will plan to increase gabapentin and carbamazepine to her prior dosages.  Patient to contact the clinic if she increases medication doses.  She is planning to complete labs through her primary care physician soon.  Patient to follow-up with me in 2 months.       I spent 20 minutes on patient care activities related to this encounter on the date of service, including time spent reviewing the chart, obtaining history and examination and in counseling the patient, and in documentation in the electronic medical record.      Cecy MOHR CNP  Department of Neurosurgery      Sincerely,    Milo Muñoz MD

## 2022-05-04 NOTE — PROGRESS NOTES
Nu is a 66 year old who is being evaluated via a billable telephone visit.      What phone number would you like to be contacted at? [phone]    How would you like to obtain your AVS? Mail a copy  Phone call duration: 25 minutes        [facility]  Department of Neurosurgery      Name: Nu Taylor  MRN: [id number]  Age: 66 year old  : 1955  Referring provider: Milo Muñoz  2022      Chief Complaint:   Right trigeminal neuralgia    History of Present Illness:   Nu Taylor is a 66 year old female with a history of right V2-V3 trigeminal neuralgia who is seen today for a follow-up.  Most recently seen by Dr. Muñoz in our clinic on 10/5/2021.  Surgical options were discussed at that time but ultimately decided to continue medications and to follow-up in 3 months.    Today I had a phone visit with the patient.  Per patient, she lived in [place] for the past 6 months and had significantly reduced pain from trigeminal neuralgia.  Prior to leaving to [place], she was taking gabapentin 300 mg 3 times a day and carbamazepine 600 mg twice a day.  She was able to wean it down to carbamazepine 600 mg once a day and gabapentin 300 mg once a day.  She noticed less fatigue on reduced doses of these medications.  She returned to [place] 2 weeks ago and is starting to notice slightly increased frequency of trigeminal neuralgia.    Per patient she experiences pain on the RIGHT side of her face, below her eye to below her chin.  This is triggered by brushing, and eating.      Review of Systems:   Pertinent items are noted in HPI or as in patient entered ROS below, remainder of complete ROS is negative.   No flowsheet data found.     Physical Exam:   N/A (telephone visit)    Imaging:  No new imaging    Assessment:  Right V2-V3 trigeminal neuralgia    Plan:  We will continue her current medications for now.  If patient has more frequent episodes from trigeminal neuralgia, she will plan  to increase gabapentin and carbamazepine to her prior dosages.  Patient to contact the clinic if she increases medication doses.  She is planning to complete labs through her primary care physician soon.  Patient to follow-up with me in 2 months.       I spent 20 minutes on patient care activities related to this encounter on the date of service, including time spent reviewing the chart, obtaining history and examination and in counseling the patient, and in documentation in the electronic medical record.      Cecy MOHR, CNP  Department of Neurosurgery

## 2022-05-05 DIAGNOSIS — G50.0 TRIGEMINAL NEURALGIA: ICD-10-CM

## 2022-05-05 RX ORDER — CARBAMAZEPINE 200 MG/1
TABLET ORAL
Qty: 540 TABLET | Refills: 1 | OUTPATIENT
Start: 2022-05-05

## 2022-05-10 DIAGNOSIS — M75.121 COMPLETE TEAR OF RIGHT ROTATOR CUFF, UNSPECIFIED WHETHER TRAUMATIC: ICD-10-CM

## 2022-05-10 RX ORDER — NAPROXEN 500 MG/1
TABLET ORAL
Qty: 14 TABLET | Refills: 0 | Status: SHIPPED | OUTPATIENT
Start: 2022-05-10 | End: 2022-06-20

## 2022-05-10 NOTE — TELEPHONE ENCOUNTER
Routing refill request to provider for review/approval because:  Pt over 66 yo.    Ongoing Rx?    Leanne NEFF RN

## 2022-05-12 ENCOUNTER — VIRTUAL VISIT (OUTPATIENT)
Dept: FAMILY MEDICINE | Facility: CLINIC | Age: 67
End: 2022-05-12
Payer: MEDICARE

## 2022-05-12 DIAGNOSIS — R19.7 DIARRHEA, UNSPECIFIED TYPE: ICD-10-CM

## 2022-05-12 DIAGNOSIS — Z20.818 EXPOSURE TO STREP THROAT: Primary | ICD-10-CM

## 2022-05-12 PROCEDURE — 99213 OFFICE O/P EST LOW 20 MIN: CPT | Mod: 95 | Performed by: PHYSICIAN ASSISTANT

## 2022-05-12 NOTE — PROGRESS NOTES
"Nu is a 66 year old who is being evaluated via a billable telephone visit.      What phone number would you like to be contacted at? [phone]  How would you like to obtain your AVS? Mail a copy    Assessment & Plan     Exposure to strep throat  No sore throat at this time.  May choose to get tested given exposure    - Streptococcus A Rapid Scr w Reflx to PCR - Lab Collect    Diarrhea  Only 2 diarrheal stools- cautiously reassured. Has access to covid testing if needing     Ordering of each unique test  18 minutes spent on the date of the encounter doing chart review, history and exam, documentation and further activities per the note       BMI:   Estimated body mass index is 30.56 kg/m  as calculated from the following:    Height as of 4/25/22: 1.6 m (5' 3\").    Weight as of 4/25/22: 78.2 kg (172 lb 8 oz).   Weight management plan: Discussed healthy diet and exercise guidelines    Patient Instructions   Drink lots of fluids  You may schedule a strep test at any [facility] lab at your convenience for strep testing  Return urgently if any change in symptoms like fever, cough, shortness of breath, abdominal pain or other change in symptoms.        Return in about 3 days (around 5/15/2022), or if symptoms worsen or fail to improve, for in person.    MICHAEL [facility]   Nu is a 66 year old who presents for the following health issues     HPI     Gastrointestinal upset, occasional diarrhea x 1day    Has had 2 diarrheal stool today.     Have been with 7 year old grandson last 2 days and she brought him to the emergency department this morning and has strep- was tested for covid and strep this am.  Negative covid and positive strep  Know when coming down with something.  Outside a lot yesterday while he was sleeping.   Child had 102 temp this am   Was sleeping this afternoon-takes a nap every day-had some abdominal cramps - daughter in law has "  out of her home  Now I feel fine.  Stomach is fine now.   Worry about going home and giving something to her .  Been out of state last 6 months in [place].    No sore throat. No cough.  No fever.  Have day sweats every other month.  Normally doesn't get them during the day- normally gets night sweats (hot flashes) .  Getting up early to care for grandson not used to   Fine last night and this AM  Just concerned she may be coming down with something        Review of Systems   Constitutional, HEENT, cardiovascular, pulmonary, gi and gu systems are negative, except as otherwise noted.      Objective           Vitals:  No vitals were obtained today due to virtual visit.    Physical Exam   healthy, alert and no distress  PSYCH: Alert and oriented times 3; coherent speech, normal   rate and volume, able to articulate logical thoughts, able   to abstract reason, no tangential thoughts, no hallucinations   or delusions  Her affect is normal and pleasant  RESP: No cough, no audible wheezing, able to talk in full sentences  Remainder of exam unable to be completed due to telephone visits                Phone call duration: 10 minutes

## 2022-05-12 NOTE — PATIENT INSTRUCTIONS
Drink lots of fluids  You may schedule a strep test at any [facility] lab at your convenience for strep testing  Return urgently if any change in symptoms like fever, cough, shortness of breath, abdominal pain or other change in symptoms.

## 2022-05-14 DIAGNOSIS — M75.121 COMPLETE TEAR OF RIGHT ROTATOR CUFF, UNSPECIFIED WHETHER TRAUMATIC: ICD-10-CM

## 2022-05-16 RX ORDER — NAPROXEN 500 MG/1
TABLET ORAL
Qty: 14 TABLET | Refills: 0 | OUTPATIENT
Start: 2022-05-16

## 2022-05-16 NOTE — TELEPHONE ENCOUNTER
Called and spoke with pt, automatic request for refill from pharmacy. Pt states she does not need refill and will contact pharmacy.    Leanne NEFF RN

## 2022-05-16 NOTE — TELEPHONE ENCOUNTER
Routing refill request to provider for review/approval because:  Due to age    Annalisa Westfall RN

## 2022-06-08 ENCOUNTER — TELEPHONE (OUTPATIENT)
Dept: FAMILY MEDICINE | Facility: CLINIC | Age: 67
End: 2022-06-08
Payer: MEDICARE

## 2022-06-08 ENCOUNTER — TELEPHONE (OUTPATIENT)
Dept: OBGYN | Facility: CLINIC | Age: 67
End: 2022-06-08
Payer: MEDICARE

## 2022-06-08 NOTE — TELEPHONE ENCOUNTER
I saw the patient last in October 2021 and at that time noticed a significant cystocele and mixed urinary incontinence with a component of urgency frequency stress incontinence and intrinsic sphincter deficiency.  It was my hope that a conservative management measures with the pessary and Myrbetriq would provide adequate relief but that obviously is not the case.  Can you please put her on my schedule at your earliest convenience.   I would like to talk with her about surgical options and answer any questions that she has.  Thank you for your help with this

## 2022-06-08 NOTE — TELEPHONE ENCOUNTER
Patient calling to let us know that she has changed her pharmacy to [facility]. She states they will send over the refill request.  Yana Ta,

## 2022-06-08 NOTE — TELEPHONE ENCOUNTER
Patient calling:  Last appt 10/2021  Just returned to [place].  Having 2-3 episodes of incontinence daily.  Pessary had not helped.  Stopped medication 6 months ago.  (was not working)    She would like to discuss surgery.    Please advise.  Dora Julio RN

## 2022-06-20 ENCOUNTER — OFFICE VISIT (OUTPATIENT)
Dept: OBGYN | Facility: CLINIC | Age: 67
End: 2022-06-20
Payer: MEDICARE

## 2022-06-20 VITALS — DIASTOLIC BLOOD PRESSURE: 78 MMHG | SYSTOLIC BLOOD PRESSURE: 128 MMHG | BODY MASS INDEX: 30.65 KG/M2 | WEIGHT: 173 LBS

## 2022-06-20 DIAGNOSIS — N36.42 INTRINSIC SPHINCTER DEFICIENCY: ICD-10-CM

## 2022-06-20 DIAGNOSIS — N39.46 MIXED STRESS AND URGE URINARY INCONTINENCE: Primary | ICD-10-CM

## 2022-06-20 PROCEDURE — 99214 OFFICE O/P EST MOD 30 MIN: CPT | Performed by: OBSTETRICS & GYNECOLOGY

## 2022-06-20 NOTE — PATIENT INSTRUCTIONS
You can reach your [facility] Care Team any time of the day by calling [phone]. This number will put you in touch with the 24 hour nurse line if the clinic is closed.    To contact your OB/GYN Station Coordinator/Surgery Scheduler please call [phone]. This is a direct number for your care team between 8 a.m. and 4 p.m. Monday through Friday.    [facility] is open for your convenience:  Monday through Friday 8 a.m. to 6 p.m.  Closed weekends and all major holidays.

## 2022-06-20 NOTE — NURSING NOTE
"Chief Complaint   Patient presents with     Consult       Initial /78   Wt 78.5 kg (173 lb)   LMP 10/22/2008 (Exact Date)   BMI 30.65 kg/m   Estimated body mass index is 30.65 kg/m  as calculated from the following:    Height as of 22: 1.6 m (5' 3\").    Weight as of this encounter: 78.5 kg (173 lb).  BP completed using cuff size: regular    Questioned patient about current smoking habits.  Pt. has never smoked.          The following HM Due: NONE      The following patient reported/Care Every where data was sent to:  P ABSTRACT QUALITY INITIATIVES [60993]        Linda Holland [facility]                 "

## 2022-06-21 NOTE — PROGRESS NOTES
The patient is a. 66 year old  white female  the largest 9 pounds 2 ounces, this was her first, she states this was a hard delivery and was forceps assisted, the rest were vaginal deliveries with one  delivery that did not survive, tubal sterilization, last menstrual period approximately age 49 for contraception, not on HRT with no vaginal bleeding since whom I was asked to see by RADHA Maldonado  for evaluation of a symptomatic midline cystocele that the patient states she first noticed in approximately 2021.  The patient had pelvic floor pressure and incomplete emptying but no other bladder symptoms.  Patient states that she has a history of constipation and oftentimes uses a finger vaginally to aid in expelling of stool.  The patient underwent a SPARC procedure by Dr. Molina in  and states that it worked well for the first 2 years.  After that time she had trouble with urgency and frequency is been treated with Sanctura oxybutynin and Vesicare.  The patient denies any leakage of urine with laughing lifting coughing or straining but says the urgency and frequency are bothersome to her.  Patient does wear protection.  Pregnancy history includes forcep assisted vaginal delivery with her first followed by vaginal deliveries,, no other known history of bladder or bowel injury. Patient complains of urinary leakage with urgency frequency and urge incontinence.  Sometimes standing from a sitting position can trigger leakage.  At the time of her initial evaluation on 2021 the patient had a grade 2-3 midline cystocele, greater than 30 degrees of UV angle hypermobility and grade 1 cervical descent.  See the office visit notes from 2021 and the urodynamic study as well as visits of 2021, 5/10/2021 2021 and 10/14/2021 for further details.  At the time of the urodynamic study on 2021 I noted mixed urinary incontinence with a component of urgency frequency and urge  incontinence as well as urinary stress incontinence and intrinsic sphincter deficiency.  A trial of Myrbetriq was started at that visit.  On 5/10/2021 the patient was also fitted with a #3 diaphragm type pessary which resulted in good reduction of her cystocele.  The patient demonstrated proficiency with insertion and removal.  Patient states that she also has trigeminal neuralgia and has had extensive therapy and treatment for this.  At present she is also bothered with intense constipation in part due to the medications that she uses for her trigeminal neuralgia.  She notices that when she is very constipated her cystocele becomes more prominent.  She presents today to discuss her options.  The patient has since stopped Myrbetriq as she said that she is on enough medications for her trigeminal neuralgia.    Past Medical History:   Diagnosis Date     Allergic rhinitis      HTN (hypertension)      Hyperlipidemia 08/06/2008     Trigeminal neuralgia      Current Outpatient Medications   Medication     amLODIPine-benazepril (LOTREL) 10-40 MG capsule     carBAMazepine (TEGRETOL) 200 MG tablet     gabapentin (NEURONTIN) 300 MG capsule     linaclotide (LINZESS) 145 MCG capsule     Multiple Vitamins-Minerals (CENTRUM PO)     No current facility-administered medications for this visit.     /78   Wt 78.5 kg (173 lb)   LMP 10/22/2008 (Exact Date)   BMI 30.65 kg/m    Constitutional: healthy, alert and mild distress    (N39.46) Mixed stress and urge urinary incontinence  (primary encounter diagnosis)  Comment: I had a lengthy discussion with the patient today regarding urgency frequency urge incontinence, her history of urodynamically demonstrated intrinsic sphincter deficiency and the definition of mixed incontinence and the treatment alternatives that were available to her.  At this time urgency frequency appears to be a predominant factor.  We discussed advanced therapies for urgency frequency and urge incontinence  consisting of Botox, posterior tibial nerve stimulation or neuro sacral modulation.  I went through a lengthy discussion and gave her a detailed written outline in each of these modalities.  I also gave her patient education information on neuro sacral modulation.  Plan: My plan at this point would be to place a temporary neuro sacral modulation implant and assess efficacy.  If there is appropriate relief in symptoms consider permanent implantation.  If at that point her control is not adequate may consider a bulking agent    (N36.42) Intrinsic sphincter deficiency  Comment: As above  Plan: 25 minutes spent in patient interview chart review evaluation plan formation and documentation.  Patient will let us know if her wishes

## 2022-06-27 ENCOUNTER — HOSPITAL ENCOUNTER (OUTPATIENT)
Dept: MAMMOGRAPHY | Facility: CLINIC | Age: 67
Discharge: HOME | End: 2022-06-27
Attending: PHYSICIAN ASSISTANT | Admitting: PHYSICIAN ASSISTANT
Payer: MEDICARE

## 2022-06-27 PROCEDURE — 77067 SCR MAMMO BI INCL CAD: CPT

## 2022-07-06 DIAGNOSIS — G50.0 TRIGEMINAL NEURALGIA: ICD-10-CM

## 2022-07-06 NOTE — TELEPHONE ENCOUNTER
Routing refill request to provider for review/approval because:  Drug not on the [facility] refill protocol     Annalisa Westfall RN

## 2022-07-07 ENCOUNTER — TELEPHONE (OUTPATIENT)
Dept: NEUROLOGY | Facility: CLINIC | Age: 67
End: 2022-07-07

## 2022-07-07 ENCOUNTER — TELEPHONE (OUTPATIENT)
Dept: NEUROSURGERY | Facility: CLINIC | Age: 67
End: 2022-07-07

## 2022-07-07 ENCOUNTER — HOSPITAL ENCOUNTER (EMERGENCY)
Facility: HOSPITAL | Age: 67
Discharge: HOME | End: 2022-07-07
Attending: EMERGENCY MEDICINE
Payer: MEDICARE

## 2022-07-07 VITALS
DIASTOLIC BLOOD PRESSURE: 66 MMHG | SYSTOLIC BLOOD PRESSURE: 102 MMHG | OXYGEN SATURATION: 90 % | HEIGHT: 62 IN | RESPIRATION RATE: 20 BRPM | HEART RATE: 76 BPM | WEIGHT: 175.93 LBS | BODY MASS INDEX: 32.37 KG/M2 | TEMPERATURE: 98.1 F

## 2022-07-07 DIAGNOSIS — G50.0 TRIGEMINAL NEURALGIA OF RIGHT SIDE OF FACE: Primary | ICD-10-CM

## 2022-07-07 PROCEDURE — 96361 HYDRATE IV INFUSION ADD-ON: CPT

## 2022-07-07 PROCEDURE — 6360000200 HC RX 636 W HCPCS (ALT 250 FOR IP): Performed by: NURSE PRACTITIONER

## 2022-07-07 PROCEDURE — 96375 TX/PRO/DX INJ NEW DRUG ADDON: CPT

## 2022-07-07 PROCEDURE — 2580000300 HC RX 258: Performed by: NURSE PRACTITIONER

## 2022-07-07 PROCEDURE — 99284 EMERGENCY DEPT VISIT MOD MDM: CPT | Performed by: EMERGENCY MEDICINE

## 2022-07-07 PROCEDURE — 96365 THER/PROPH/DIAG IV INF INIT: CPT

## 2022-07-07 RX ORDER — PROCHLORPERAZINE EDISYLATE 5 MG/ML
10 INJECTION INTRAMUSCULAR; INTRAVENOUS ONCE
Status: COMPLETED | OUTPATIENT
Start: 2022-07-07 | End: 2022-07-07

## 2022-07-07 RX ORDER — GABAPENTIN 300 MG/1
CAPSULE ORAL
Qty: 90 CAPSULE | Refills: 3 | Status: SHIPPED | OUTPATIENT
Start: 2022-07-07 | End: 2022-08-17

## 2022-07-07 RX ORDER — KETOROLAC TROMETHAMINE 30 MG/ML
15 INJECTION, SOLUTION INTRAMUSCULAR; INTRAVENOUS ONCE
Status: COMPLETED | OUTPATIENT
Start: 2022-07-07 | End: 2022-07-07

## 2022-07-07 RX ORDER — SODIUM CHLORIDE 9 MG/ML
1000 INJECTION, SOLUTION INTRAVENOUS ONCE
Status: COMPLETED | OUTPATIENT
Start: 2022-07-07 | End: 2022-07-07

## 2022-07-07 RX ORDER — DEXAMETHASONE SODIUM PHOSPHATE 4 MG/ML
4 INJECTION, SOLUTION INTRA-ARTICULAR; INTRALESIONAL; INTRAMUSCULAR; INTRAVENOUS; SOFT TISSUE ONCE
Status: COMPLETED | OUTPATIENT
Start: 2022-07-07 | End: 2022-07-07

## 2022-07-07 RX ADMIN — PROCHLORPERAZINE EDISYLATE 10 MG: 5 INJECTION INTRAMUSCULAR; INTRAVENOUS at 20:00

## 2022-07-07 RX ADMIN — KETOROLAC TROMETHAMINE 15 MG: 30 INJECTION, SOLUTION INTRAMUSCULAR; INTRAVENOUS at 20:10

## 2022-07-07 RX ADMIN — SODIUM CHLORIDE 1000 ML: 9 INJECTION, SOLUTION INTRAVENOUS at 19:56

## 2022-07-07 RX ADMIN — SODIUM CHLORIDE 800 MG: 9 INJECTION, SOLUTION INTRAVENOUS at 20:29

## 2022-07-07 RX ADMIN — DEXAMETHASONE SODIUM PHOSPHATE 4 MG: 4 INJECTION, SOLUTION INTRAMUSCULAR; INTRAVENOUS at 19:57

## 2022-07-07 NOTE — TELEPHONE ENCOUNTER
Writer routed to Neurosurgery Nurse [place]   Attn: Mara Kim, RNCC for Dr. Muñoz  *Patient reports pain with no relief from RX.     Amanda Santamaria LPN  Neurosurgery

## 2022-07-07 NOTE — TELEPHONE ENCOUNTER
[facility] Call Center    Phone Message    May a detailed message be left on voicemail: yes     Reason for Call: Other: Pt's  Thomas is calling. Pt has trigeminal neuralgia. Pt has went back up on her medications because of the pain and it is not helping. Pt has severe facial pain- she can't eat or brush her teeth etc. The meds are not helping and they don't know what else to do. Please advise.     Action Taken: Message routed to:  [facility] Neurosurgery    Travel Screening: Not Applicable

## 2022-07-07 NOTE — TELEPHONE ENCOUNTER
[facility] Call Center    Phone Message    May a detailed message be left on voicemail: yes     Reason for Call: Other: Patient is waiting for call back, in a lot of pain. Please see previous messages.     Action Taken: Message routed to:  [facility] neurosurgery    Travel Screening: Not Applicable                                                                       Encephalopathy, unspecified

## 2022-07-08 NOTE — ED ATTESTATION NOTE
"I performed a substantive portion of the care of this patient.  I performed a history and physical examination of Lorna Pena and discussed her management with Advanced Practice Provider, ,\"Elizabeth Dave CNP .  I agree with the history, physical, assessment, and plan of care.  On my face-to-face visit with patient, NAD, she is feeling significantly better after receiving medications here.  She states she will be in the area through the weekend.  Then returning home to [place].  Denies any fevers.  Symptoms are typical of her trigeminal neuralgia.  She did have some headache as well.  This is improving able to open her mouth and talk now.  Feels that she is comfortable with discharge home    Physical Exam  Vitals and nursing note reviewed.   Constitutional:       General: She is not in acute distress.     Appearance: She is well-developed. She is not diaphoretic.   HENT:      Head: Normocephalic and atraumatic.   Eyes:      Conjunctiva/sclera: Conjunctivae normal.      Pupils: Pupils are equal.   Pulmonary:      Effort: Pulmonary effort is normal.   Musculoskeletal:      Cervical back: Normal range of motion.   Neurological:      General: No focal deficit present.      Mental Status: She is alert and oriented to person, place, and time.   Psychiatric:         Mood and Affect: Mood normal.         Behavior: Behavior normal.         Thought Content: Thought content normal.         Judgment: Judgment normal.            I personally performed the entirety of the medical decision making including but not limited to symptom management, medication decisions and disposition determination.           MD Juliet Cano MD  07/08/22 0104    "

## 2022-07-08 NOTE — TELEPHONE ENCOUNTER
Gabapentin 300mg one capsule  3x daily  Carbamezapine 200mg 3 tablets 2 times daily.    Patient would like to talk with Provider asap on Monday.  Facial pain much improved after ED medication yesterday  Appointment changed to Cecy Jernigan NP Monday morning at 8am.  Note sent to scheduling.    Patient is camping in [place] in remote area with  over the weekend.  If needed, if pain starts to get worse again, can take one extra Tegretol 200mg tablet and one extra Gabapentin 300mg tablet one time only.  Appointment on Monday .

## 2022-07-08 NOTE — TELEPHONE ENCOUNTER
Patient to ED last night in [place] where patient is visiting.  States requested IV Cerebyx infusion Per Nalini MOORE resident phone call yesterday.  States home in bed sleeping now.  Asked  Thomas to have patient call me today with update and medication review.    Voices understanding, has my name and number.

## 2022-07-08 NOTE — TELEPHONE ENCOUNTER
Telephone encounter    Nu Taylor is a 66 year old female from [place] with a history of right V2-V3 trigeminal neuralgia who was seen by Cecy Jernigan 5/4/22 with plan to continue Gabapentin and Tegretol and follow up in 2 months.    She called today with concern for worsening facial pain and inability to speak or eat very much. History provided by , Thomas. He states that this started occurring about a day ago. She endorses great pain and has only been able to eat some yogurt and water. She has an appt with Cecy next week but would like to know what they can do about the pain. Denies any other symptoms or concerns.     We discussed that she could present to nearby ED and may be able to receive a Cerebyx infusion. We also discussed calling the clinic tomorrow and try to get seen sooner. Patient decided to monitor symptoms one more night and present to ED if necessary. They will call the clinic with any further concerns.     Nalini Reyes MD  PGY-2

## 2022-07-08 NOTE — DISCHARGE INSTRUCTIONS
Continue taking your medications as prescribed.  Return to the emergency department for any new or worsening symptoms

## 2022-07-08 NOTE — ED PROVIDER NOTES
ROOM:    [facility]:  Chief Complaint   Patient presents with   • Facial Pain     Pt brought in by daughter for trigeminal neuralgia that started to flare this past Monday.  Pt has had intermittent flares since 2017.  Pt is traveling from [place].      HPI  Patient is a 66-year-old female who presents emergency department for evaluation of right-sided facial pain and headache.  Patient has PMH significant for trigeminal neuralgia.  She is currently taking Tegretol 200 mg twice daily and gabapentin 300 mg three times daily.  She is traveling from [place] where she has not established neurosurgeon that follows her care.  She reports she began experiencing uncontrolled pain last night.  She rates her pain 10/10 and describes as constant stabbing.  She is unable to identify alleviating factors.  Talking, p.o. intake, or any palpation exacerbates her pain.  She reports she has associated headache.  Reviewed the past medical, family, and social history as documented by the nurse and agree. Patient denies fever, chills, chest pain, shortness of breath, palpitations, lightheadedness, dizziness, abdominal pain, nausea/vomiting    HISTORY:  Past Medical History:   Diagnosis Date   • Hypercholesteremia    • Hypertension    • Trigeminal neuralgia of right side of face        Past Surgical History:   Procedure Laterality Date   • BLADDER SURGERY     • TONSILLECTOMY AND ADENOIDECTOMY     • TUBAL LIGATION      and reversal       No family history on file.    Social History     Tobacco Use   • Smoking status: Never Smoker   • Smokeless tobacco: Never Used   Vaping Use   • Vaping Use: Never used   Substance Use Topics   • Alcohol use: Not Currently   • Drug use: Never       ROS:  Constitutional: negative for fever.  Eyes: negative for eye pain, negative for vision changes.  ENT: negative for sore throat, negative for congestion, negative for ear pain, positive for right facial pain.  Cardiovascular: negative for chest  pain  Respiratory: negative for cough, negative for shortness of breath.  GI: negative for abdominal pain, negative for nausea, negative for vomiting.  : negative for hematuria, negative for urine changes.  Musculoskeletal: negative for back pain.  Neuro: Positive for headache, negative for weakness, negative for neurological changes.  Hematology: negative for bleeding  Skin: negative for rash  All other systems were reviewed and are negative.     PHYSICAL EXAM:  ED Triage Vitals   Temp Heart Rate Resp BP SpO2   07/07/22 1838 07/07/22 1838 07/07/22 1838 07/07/22 1838 07/07/22 1838   36.7 °C (98.1 °F) 94 18 (!) 172/108 97 %      Mean BP (mmHg) Temp Source Heart Rate Source Patient Position BP Location   07/07/22 1838 07/07/22 1838 -- 07/07/22 2018 --   135 Oral  Supine       FiO2 (%)       --                Nursing note and vitals reviewed.  Constitutional: appears well-developed.   HENT: Dry mucous membranes.   Head: Normocephalic and atraumatic.   Eyes: Conjunctiva normal. Pupils are equal and round.  Neck: Supple.   Cardiovascular: Regular rate and rhythm. No murmurs, rubs, or gallops.  Pulmonary/Chest: No respiratory distress.  Clear to auscultation bilaterally.  Abdominal: Soft and nontender.  Normal bowel sounds. No rebound or guarding.  Back: Non-tender.  Musculoskeletal: No edema  Neurological: Alert. No focal deficits.  Skin: Skin is warm and dry. No rash or lesions noted.  Psychiatric: Normal mood and affect.    Labs Reviewed - No data to display    No orders to display       ED Medication Administration from 07/07/2022 1833 to 07/07/2022 2208       Date/Time Order Dose Route Action Action by     07/07/2022 2029 fosphenytoin 800 mg in sodium chloride 0.9 % 116 mL IVPB 800 mg intravenous New Bag/New Syringe ALISHA Cullen     07/07/2022 2049 fosphenytoin 800 mg in sodium chloride 0.9 % 116 mL IVPB 0 mg PE/kg intravenous Stopped JOSE DANIEL Barron     07/07/2022 1956 sodium chloride 0.9 % bolus 1,000 mL 1,000 mL  intravenous New Bag/New Syringe ALISHA Cullen     07/07/2022 2056 sodium chloride 0.9 % bolus 1,000 mL 0 mL intravenous Stopped JOSE DANIEL Barron     07/07/2022 2000 prochlorperazine (COMPAZINE) injection 10 mg 10 mg intravenous Given ALISHA Cullen     07/07/2022 1957 dexamethasone (DECADRON) injection 4 mg 4 mg intravenous Given Elenakalani Heather ALISHA     07/07/2022 2010 ketorolac (TORADOL) injection 15 mg 15 mg intravenous Given Elenakalani Heather ALISHA          ED COURSE:     Sepsis Quality Bundle      MDM:     Patient is a 66-year-old female who presents emergency department for evaluation of right-sided facial pain and headache.  Patient has PMH significant for trigeminal neuralgia.  Patient has a history of headaches and states this one is similar to previous episodes. Her headache today was gradual in onset and is frontal.  She does not have associated neurologic symptoms and neurologic exam is normal.  She has some photophobia with this but no nausea or vomiting.  She was also given IV fluids and Toradol and Compazine and Benadryl with the headache protocol.  Her acute trigeminal neuralgia is resistant to her normal medications.  She was given fosphenytoin infusion.  She was given on repeat evaluation, she is much better. I do not believe she needs lumbar puncture and did not believe she has meningitis or aneurysm at this time. I did review return instructions for these processes.  She was discharged with primary care follow-up and return instructions were reviewed.    Patient was seen in conjunction with Dr. Braden, supervising physician, examined patient and agrees with plan of care and disposition.      PROCEDURES:  Procedures        CLINICAL IMPRESSION:  Final diagnoses:   [G50.0] Trigeminal neuralgia of right side of face   Headache  Hypertension  Hypercholesterolemia        A voice recognition program was used to aid in documentation of this record.  Sometimes words are not printed exactly as they were spoken.   While efforts were made to carefully edit and correct any inaccuracies, some areas may be present; please take these into context.  Please contact the provider if areas are identified.       Elizabeth Dave, CNP  07/08/22 4466

## 2022-07-11 ENCOUNTER — VIRTUAL VISIT (OUTPATIENT)
Dept: NEUROSURGERY | Facility: CLINIC | Age: 67
End: 2022-07-11
Payer: MEDICARE

## 2022-07-11 ENCOUNTER — DOCUMENTATION ONLY (OUTPATIENT)
Dept: NEUROSURGERY | Facility: CLINIC | Age: 67
End: 2022-07-11

## 2022-07-11 DIAGNOSIS — G50.0 TRIGEMINAL NEURALGIA: Primary | ICD-10-CM

## 2022-07-11 PROCEDURE — 99443 PR PHYSICIAN TELEPHONE EVALUATION 21-30 MIN: CPT | Mod: 95 | Performed by: NURSE PRACTITIONER

## 2022-07-11 NOTE — LETTER
2022       RE: Nu Taylor  [id number] [address]     Dear Colleague,    Thank you for referring your patient, Nu Taylor, to the [facility] [place] at [facility]. Please see a copy of my visit note below.      [facility]  Department of Neurosurgery      Name: Nu Taylor  MRN: [id number]  Age: 66 year old  : 1955  Referring provider: Milo Muñoz  2022      Chief Complaint:   Right trigeminal neuralgia  Follow-up     History of Present Illness:   Nu Taylor is a 66 year old female with a history of right V2-V3 trigeminal neuralgia who is seen today for a follow-up.  Most recently had a virtual visit with me on 2022.     Per patient, since  she has been having more frequent episodes of trigeminal neuralgia.  She was unable to eat regular food and was not able to open her mouth due to frequent episodes.  Subsequently went to the emergency department on  and was treated with fosphenytoin IV infusion, Tegretol and Decadron.    Today I had a phone visit with the patient.  Per patient yesterday and today she has been feeling well without any episodes.  She is currently taking gabapentin 300 mg 3 times a day and carbamazepine 600 mg twice a day.  She reports mild issues with her balance, cognitive fogging and fatigue.  No falls.  Per patient, the side effects are manageable.  Most recent labs from 2021 shows normal CBC and CMP.      Review of Systems:   Pertinent items are noted in HPI or as in patient entered ROS below, remainder of complete ROS is negative.   No flowsheet data found.     Physical Exam:   N/A (telephone visit)    Imaging:  No new imaging.    Assessment:  Right trigeminal neuralgia  Follow-up visit    Plan:  Patient will continue current medications for now.  She is planning to have her annual physical August and will have labs  completed at that time.  We discussed surgical options in detail.  Per patient, she would like to manage her symptoms with current medications for now.  She is planning to travel to [place] in September and will be living there for 6 months or so.  She will contact the clinic as needed.       I spent 40 minutes on patient care activities related to this encounter on the date of service, including time spent reviewing the chart, obtaining history and examination and in counseling the patient, and in documentation in the electronic medical record.      Cecy MOHR CNP  Department of Neurosurgery

## 2022-07-11 NOTE — PROGRESS NOTES
I called this patient for today's telephone appointment. No answer, unable to leave voice mail. Will reschedule.

## 2022-07-11 NOTE — PROGRESS NOTES
Nu is a 66 year old who is being evaluated via a billable telephone visit.      What phone number would you like to be contacted at? [phone]  How would you like to obtain your AVS? Mail a copy  Phone call duration: 35 minutes    [facility]  Department of Neurosurgery      Name: Nu Taylor  MRN: [id number]  Age: 66 year old  : 1955  Referring provider: Milo Muñoz  2022      Chief Complaint:   Right trigeminal neuralgia  Follow-up     History of Present Illness:   Nu Taylor is a 66 year old female with a history of right V2-V3 trigeminal neuralgia who is seen today for a follow-up.  Most recently had a virtual visit with me on 2022.     Per patient, since  she has been having more frequent episodes of trigeminal neuralgia.  She was unable to eat regular food and was not able to open her mouth due to frequent episodes.  Subsequently went to the emergency department on  and was treated with fosphenytoin IV infusion, Tegretol and Decadron.    Today I had a phone visit with the patient.  Per patient yesterday and today she has been feeling well without any episodes.  She is currently taking gabapentin 300 mg 3 times a day and carbamazepine 600 mg twice a day.  She reports mild issues with her balance, cognitive fogging and fatigue.  No falls.  Per patient, the side effects are manageable.  Most recent labs from 2021 shows normal CBC and CMP.      Review of Systems:   Pertinent items are noted in HPI or as in patient entered ROS below, remainder of complete ROS is negative.   No flowsheet data found.     Physical Exam:   N/A (telephone visit)    Imaging:  No new imaging.    Assessment:  Right trigeminal neuralgia  Follow-up visit    Plan:  Patient will continue current medications for now.  She is planning to have her annual physical August and will have labs completed at that time.  We discussed surgical options in detail.  Per patient, she  would like to manage her symptoms with current medications for now.  She is planning to travel to [place] in September and will be living there for 6 months or so.  She will contact the clinic as needed.       I spent 40 minutes on patient care activities related to this encounter on the date of service, including time spent reviewing the chart, obtaining history and examination and in counseling the patient, and in documentation in the electronic medical record.      Cecy MOHR CNP  Department of Neurosurgery

## 2022-07-29 DIAGNOSIS — G50.0 TRIGEMINAL NEURALGIA: ICD-10-CM

## 2022-07-29 NOTE — TELEPHONE ENCOUNTER
Pt called clinic asking if her Carbamazepine could be refilled. She would like ONLY a 14 day supply sent to the [facility] in [place] off of [facility].     Loida Malin CMA    Pending Prescriptions:                       Disp   Refills    carBAMazepine (TEGRETOL) 200 MG tablet    540 ta*1

## 2022-08-01 RX ORDER — CARBAMAZEPINE 200 MG/1
TABLET ORAL
Qty: 540 TABLET | Refills: 1 | Status: SHIPPED | OUTPATIENT
Start: 2022-08-01 | End: 2024-07-12

## 2022-08-17 DIAGNOSIS — G50.0 TRIGEMINAL NEURALGIA: ICD-10-CM

## 2022-08-17 RX ORDER — GABAPENTIN 300 MG/1
CAPSULE ORAL
Qty: 90 CAPSULE | Refills: 0 | Status: SHIPPED | OUTPATIENT
Start: 2022-08-17 | End: 2022-10-17

## 2022-08-17 NOTE — TELEPHONE ENCOUNTER
Patient calls for refill of Gabapentin, wanting to change prescription to [facility] instead of [facility]. He is also requesting quantity change to 60 day supply be sent to [facility] as she is going to get 30 day supply from [facility] while waiting for [facility] to process her new prescription.     Previously seeing Dr. Bone, patient aware she will need appointment to establish care with new provider.     Routing refill request to provider for review/approval because:  Drug not on the [facility] refill protocol   Patient requesting quantity change.    Siomara Givens RN  [facility]

## 2022-08-17 NOTE — TELEPHONE ENCOUNTER
1 month refill given, no further refills until patient establishes with provider, I do not see an appointment scheduled for her    KATYAG

## 2022-09-01 DIAGNOSIS — E78.2 MIXED HYPERLIPIDEMIA: Primary | ICD-10-CM

## 2022-09-01 RX ORDER — ATORVASTATIN CALCIUM 20 MG/1
20 TABLET, FILM COATED ORAL DAILY
Qty: 30 TABLET | Refills: 0 | Status: SHIPPED | OUTPATIENT
Start: 2022-09-01 | End: 2022-09-20

## 2022-09-01 NOTE — TELEPHONE ENCOUNTER
Reason for Call:  Medication or medication refill:    Do you use a [facility]?  Name of the pharmacy and phone number for the current request[facility]     Name of the medication requested: atorvastatin (LIPITOR) 20 MG tablet -1 tab daily    Other request: Pt is requesting 90 day supply    Can we leave a detailed message on this number? Not Applicable    Phone number patient can be reached at: Cell number on file:    Telephone Information:   Mobile [phone]       Best Time: NA    Call taken on 9/1/2022 at 10:21 AM by Carin Mcarthur

## 2022-09-01 NOTE — TELEPHONE ENCOUNTER
Routing refill request to provider for review/approval because:  Drug not active on patient's medication list - was discontinued 6/20/22.  Needs to est care with new provider    Sameera Shaw RN

## 2022-09-04 DIAGNOSIS — E78.2 MIXED HYPERLIPIDEMIA: ICD-10-CM

## 2022-09-06 RX ORDER — ATORVASTATIN CALCIUM 20 MG/1
TABLET, FILM COATED ORAL
Qty: 90 TABLET | OUTPATIENT
Start: 2022-09-06

## 2022-09-07 NOTE — TELEPHONE ENCOUNTER
Patient was notified she needs to be seen for refills. She states she is out and that we have to call the pharmacy to let them fill it. I notified her that prescription was already sent on 9/1 and that she will need to reach out to pharmacy. 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  Yana Ta,

## 2022-09-20 DIAGNOSIS — E78.2 MIXED HYPERLIPIDEMIA: ICD-10-CM

## 2022-09-20 RX ORDER — ATORVASTATIN CALCIUM 20 MG/1
20 TABLET, FILM COATED ORAL DAILY
Qty: 30 TABLET | Refills: 0 | Status: SHIPPED | OUTPATIENT
Start: 2022-09-20 | End: 2022-09-20

## 2022-09-20 RX ORDER — ATORVASTATIN CALCIUM 20 MG/1
20 TABLET, FILM COATED ORAL DAILY
Qty: 30 TABLET | Refills: 0 | Status: SHIPPED | OUTPATIENT
Start: 2022-09-20 | End: 2022-10-17

## 2022-09-20 NOTE — TELEPHONE ENCOUNTER
"Per P.T.: \"Please contact patient and inform that I sent a 30-day supply of atorvastatin to her local pharmacy in [place].   Andrés Pisano DO on 9/20/2022 at 1:29 PM \" Called and informed pt. Sent to wrong pharmacy, sent to new pharmacy. No further questions.         Leanne NEFF RN    "

## 2022-09-20 NOTE — TELEPHONE ENCOUNTER
Pt was already given 30 day kai and informed needed to est care with new provider. Pt is scheduled with P.T. on 10/17/22, please review and advise as pt does not have enough medication until appt.     Leanne NEFF RN

## 2022-10-14 NOTE — PROGRESS NOTES
Pre-Visit Planning   Next 5 appointments (look out 90 days)    Oct 17, 2022 10:00 AM  (Arrive by 9:40 AM)  Annual Wellness Visit with Andrés Pisano DO  [facility] ([facility] ) [id number] [facility] [address] [phone]        Appointment Notes for this encounter:   physical fam of 2    Questionnaires Reviewed/Assigned  No additional questionnaires are needed    Patient preferred phone number: [phone]    Unable to reach. Left voicemail. Advised patient to call clinic back at [phone].    Martha Ralph/      Assessment & Plan   See after visit summary and result note from studies for helpful information and advice given to patient.    Encounter for preventative adult health care examination    - Comprehensive metabolic panel    Epigastric discomfort    - Adult GI  Referral - Consult Only    Screening for hyperlipidemia    - Lipid Profile    Hypercholesterolemia    - Lipid Profile    Trigeminal neuralgia    - gabapentin (NEURONTIN) 300 MG capsule  Dispense: 90 capsule; Refill: 3    Benign essential hypertension    - amLODIPine-benazepril (LOTREL) 10-40 MG capsule  Dispense: 90 capsule; Refill: 3    Mixed hyperlipidemia    - atorvastatin (LIPITOR) 20 MG tablet  Dispense: 90 tablet; Refill: 3    Bilateral impacted cerumen    - REMOVE IMPACTED CERUMEN    Atypical chest pain                 Return in about 1 year (around 10/17/2023) for Routine preventive, with me.    DO CHASIDY [facility]    Subjective   Nu is a 67 year old, presenting for the following health issues:  Physical         SUBJECTIVE:   CC: Nu is an 67 year old who presents for preventive health visit.     Patient has been advised of split billing requirements and indicates understanding: Yes  Healthy Habits:     In general, how would you rate your overall health?  Good    Frequency of exercise:  2-3 days/week    " Duration of exercise:  15-30 minutes    Do you usually eat at least 4 servings of fruit and vegetables a day, include whole grains    & fiber and avoid regularly eating high fat or \"junk\" foods?  Yes    Taking medications regularly:  Yes    Medication side effects:  None    Ability to successfully perform activities of daily living:  No assistance needed    Home Safety:  No safety concerns identified    Hearing Impairment:  No hearing concerns    In the past 6 months, have you been bothered by leaking of urine? Yes    In general, how would you rate your overall mental or emotional health?  Good      PHQ-2 Total Score: 0    Additional concerns today:  Yes           Pt reports expeiencing chest pain 1-2x per month and fatigue      Today's PHQ-2 Score:   PHQ-2 ( 1999 [employer]) 10/17/2022   Q1: Little interest or pleasure in doing things 0   Q2: Feeling down, depressed or hopeless 0   PHQ-2 Score 0   PHQ-2 Total Score (12-17 Years)- Positive if 3 or more points; Administer PHQ-A if positive -   Q1: Little interest or pleasure in doing things Not at all   Q2: Feeling down, depressed or hopeless Not at all   PHQ-2 Score 0       Abuse: Current or Past (Physical, Sexual or Emotional) - No  Do you feel safe in your environment? Yes        Social History     Tobacco Use     Smoking status: Never     Smokeless tobacco: Never   Substance Use Topics     Alcohol use: Yes     Alcohol/week: 0.0 standard drinks     Comment: occ       Alcohol Use 10/17/2022   Prescreen: >3 drinks/day or >7 drinks/week? No   Prescreen: >3 drinks/day or >7 drinks/week? -       Reviewed orders with patient.  Reviewed health maintenance and updated orders accordingly - Yes      Breast Cancer Screening:    Breast CA Risk Assessment (FHS-7) 10/17/2022   Do you have a family history of breast, colon, or ovarian cancer? No / Unknown         Pertinent mammograms are reviewed under the imaging tab.    History of abnormal Pap smear: NO - age 30-65 PAP every 5 " years with negative HPV co-testing recommended  PAP / HPV Latest Ref Rng & Units 8/27/2018 4/3/2015 10/18/2011   PAP (Historical) - NIL NIL NIL   HPV16 NEG:Negative Negative Negative -   HPV18 NEG:Negative Negative Negative -   HRHPV NEG:Negative Negative Negative -     Reviewed and updated as needed this visit by clinical staff   Tobacco  Allergies  Meds   Med Hx  Surg Hx  Fam Hx  Soc Hx        Reviewed and updated as needed this visit by Provider         Fam Hx             Review of Systems   Constitutional: Negative for chills and fever.   HENT: Negative for congestion, ear pain, hearing loss and sore throat.    Eyes: Negative for pain and visual disturbance.   Respiratory: Negative for cough and shortness of breath.    Cardiovascular: Positive for chest pain. Negative for palpitations and peripheral edema.   Gastrointestinal: Negative for abdominal pain, constipation, diarrhea, heartburn, hematochezia and nausea.   Genitourinary: Positive for dysuria and urgency. Negative for frequency, genital sores and hematuria.   Musculoskeletal: Negative for arthralgias, joint swelling and myalgias.   Skin: Negative for rash.   Neurological: Negative for dizziness, weakness, headaches and paresthesias.   Psychiatric/Behavioral: Negative for mood changes. The patient is not nervous/anxious.      Patient answers submitted prior to my exam pertaining to review of systems check in questions reviewed with patient.    Patient reports she will be seeing specialist to address urinary concerns.     Concerning her chest pain elevated on review of systems, her chest pain is sharp, 2-3 times/month lasting seconds in center of chest. No associated shortness of breath. Is not related to exertion. Happens when sitting up. Had episode yesterday lasting seconds. Does not happen with exertion. It sometimes happens after eating.  She points to her epigastric area as the area of discomfort. She feels like food is getting stuck in  "epigastric area for past month, better with drinking fluids.     Patient reports she has history of faint heart murmur.     Patient feels like her ear canals are plugged with cerumen.  She would like this addressed if possible this visit.    Patient feels her current medication management is going well.  She uses Tegretol and gabapentin for relief of trigeminal neuralgia.     OBJECTIVE:   /76 (BP Location: Right arm, Patient Position: Sitting, Cuff Size: Adult Regular)   Pulse 83   Temp 98.5  F (36.9  C) (Oral)   Resp 16   Ht 1.549 m (5' 1\")   Wt 74.8 kg (165 lb)   LMP 10/22/2008 (Exact Date)   SpO2 100%   BMI 31.18 kg/m    Physical Exam  General: Vital signs reviewed.  Patient is in no acute appearing distress.  Breathing appears nonlabored.  Patient is alert and oriented ×3.      ENT: Ear exam shows bilateral cerumen impaction of your canals, nasal turbinates show no injection or edema, no pharyngeal injection or exudate.  Cerumen impaction was addressed by support staff irrigating ears.    Neck: supple with no adenoapthy, palpable abnormal masses, or thyroid abnormality.    Eyes: No scleral, lid, or periorbital injection or edema noted.  No eye mattering noted.  Corneas are clear. Pupils are equal round and reactive to light with normal consensual eye movement.    Heart: Heart rate is regular with a 2/6 systolic murmur in right upper sternal border.    Lungs: Lungs are clear to auscultation with good airflow bilaterally.    Abdomen:  Abdomen is soft, nontender.  No palpable abnormal masses or organomegaly.  Bowel sounds are normal.    Back: No areas of tenderness.    Skin: Warm and dry, with no rash or abnormal lesions noted.    Extremities: No lower leg edema noted.  No joint edema or restricted range of motion noted.    Neuro: No acute focal deficits or other abnormalities noted.    Psych: Patient is very pleasant, making good eye contact, with clear and fluent speech.  Answers questions " "appropriately. No psychomotor agitation.     Patient deferred GYN exam.        ASSESSMENT/PLAN:   Nu was seen today for physical.    Diagnoses and all orders for this visit:  See after visit summary and result note from studies for helpful information and advice given to patient.    Encounter for preventative adult health care examination  -     Comprehensive metabolic panel    Epigastric discomfort  -     Adult GI  Referral - Consult Only; Future    Screening for hyperlipidemia  -     Lipid Profile    Hypercholesterolemia  -     Lipid Profile    Trigeminal neuralgia  -     gabapentin (NEURONTIN) 300 MG capsule; TAKE 1 CAPSULE(300 MG) BY MOUTH THREE TIMES DAILY    Benign essential hypertension  -     amLODIPine-benazepril (LOTREL) 10-40 MG capsule; Take 1 capsule by mouth once daily    Mixed hyperlipidemia  -     atorvastatin (LIPITOR) 20 MG tablet; Take 1 tablet (20 mg) by mouth daily    Bilateral impacted cerumen  -     REMOVE IMPACTED CERUMEN    Atypical chest pain    Other orders  -     COVID-19,PF,MODERNA BIVALENT (18+YRS)  -     INFLUENZA, QUAD, HIGH DOSE, PF, 65YR + (FLUZONE HD)            COUNSELING:  Reviewed preventive health counseling, as reflected in patient instructions    Estimated body mass index is 31.18 kg/m  as calculated from the following:    Height as of this encounter: 1.549 m (5' 1\").    Weight as of this encounter: 74.8 kg (165 lb).        She reports that she has never smoked. She has never used smokeless tobacco.      Counseling Resources:  ATP IV Guidelines  Pooled Cohorts Equation Calculator  Breast Cancer Risk Calculator  BRCA-Related Cancer Risk Assessment: FHS-7 Tool  FRAX Risk Assessment  ICSI Preventive Guidelines  Dietary Guidelines for Americans, 2010  USDA's MyPlate  ASA Prophylaxis  Lung CA Screening    Andrés Pisano DO  [facility]            "

## 2022-10-17 ENCOUNTER — OFFICE VISIT (OUTPATIENT)
Dept: FAMILY MEDICINE | Facility: CLINIC | Age: 67
End: 2022-10-17
Payer: MEDICARE

## 2022-10-17 VITALS
DIASTOLIC BLOOD PRESSURE: 76 MMHG | BODY MASS INDEX: 31.15 KG/M2 | TEMPERATURE: 98.5 F | SYSTOLIC BLOOD PRESSURE: 128 MMHG | HEIGHT: 61 IN | OXYGEN SATURATION: 100 % | HEART RATE: 83 BPM | WEIGHT: 165 LBS | RESPIRATION RATE: 16 BRPM

## 2022-10-17 DIAGNOSIS — R07.89 ATYPICAL CHEST PAIN: ICD-10-CM

## 2022-10-17 DIAGNOSIS — I10 BENIGN ESSENTIAL HYPERTENSION: ICD-10-CM

## 2022-10-17 DIAGNOSIS — E78.00 HYPERCHOLESTEROLEMIA: ICD-10-CM

## 2022-10-17 DIAGNOSIS — E78.2 MIXED HYPERLIPIDEMIA: ICD-10-CM

## 2022-10-17 DIAGNOSIS — Z00.00 ENCOUNTER FOR PREVENTATIVE ADULT HEALTH CARE EXAMINATION: ICD-10-CM

## 2022-10-17 DIAGNOSIS — G50.0 TRIGEMINAL NEURALGIA: ICD-10-CM

## 2022-10-17 DIAGNOSIS — Z13.220 SCREENING FOR HYPERLIPIDEMIA: ICD-10-CM

## 2022-10-17 DIAGNOSIS — R10.13 EPIGASTRIC DISCOMFORT: ICD-10-CM

## 2022-10-17 DIAGNOSIS — H61.23 BILATERAL IMPACTED CERUMEN: ICD-10-CM

## 2022-10-17 LAB
ALBUMIN SERPL-MCNC: 4 G/DL (ref 3.4–5)
ALP SERPL-CCNC: 107 U/L (ref 40–150)
ALT SERPL W P-5'-P-CCNC: 25 U/L (ref 0–50)
ANION GAP SERPL CALCULATED.3IONS-SCNC: 4 MMOL/L (ref 3–14)
AST SERPL W P-5'-P-CCNC: 11 U/L (ref 0–45)
BILIRUB SERPL-MCNC: 0.3 MG/DL (ref 0.2–1.3)
BUN SERPL-MCNC: 9 MG/DL (ref 7–30)
CALCIUM SERPL-MCNC: 9.3 MG/DL (ref 8.5–10.1)
CHLORIDE BLD-SCNC: 107 MMOL/L (ref 94–109)
CHOLEST SERPL-MCNC: 211 MG/DL
CO2 SERPL-SCNC: 29 MMOL/L (ref 20–32)
CREAT SERPL-MCNC: 0.54 MG/DL (ref 0.52–1.04)
FASTING STATUS PATIENT QL REPORTED: ABNORMAL
GFR SERPL CREATININE-BSD FRML MDRD: >90 ML/MIN/1.73M2
GLUCOSE BLD-MCNC: 100 MG/DL (ref 70–99)
HDLC SERPL-MCNC: 79 MG/DL
LDLC SERPL CALC-MCNC: 110 MG/DL
NONHDLC SERPL-MCNC: 132 MG/DL
POTASSIUM BLD-SCNC: 3.9 MMOL/L (ref 3.4–5.3)
PROT SERPL-MCNC: 7.6 G/DL (ref 6.8–8.8)
SODIUM SERPL-SCNC: 140 MMOL/L (ref 133–144)
TRIGL SERPL-MCNC: 112 MG/DL

## 2022-10-17 PROCEDURE — 80061 LIPID PANEL: CPT | Performed by: FAMILY MEDICINE

## 2022-10-17 PROCEDURE — 36415 COLL VENOUS BLD VENIPUNCTURE: CPT | Performed by: FAMILY MEDICINE

## 2022-10-17 PROCEDURE — G0008 ADMIN INFLUENZA VIRUS VAC: HCPCS | Performed by: FAMILY MEDICINE

## 2022-10-17 PROCEDURE — 0134A COVID-19,PF,MODERNA BIVALENT: CPT | Performed by: FAMILY MEDICINE

## 2022-10-17 PROCEDURE — 90662 IIV NO PRSV INCREASED AG IM: CPT | Performed by: FAMILY MEDICINE

## 2022-10-17 PROCEDURE — G0438 PPPS, INITIAL VISIT: HCPCS | Performed by: FAMILY MEDICINE

## 2022-10-17 PROCEDURE — 69209 REMOVE IMPACTED EAR WAX UNI: CPT | Performed by: FAMILY MEDICINE

## 2022-10-17 PROCEDURE — 99214 OFFICE O/P EST MOD 30 MIN: CPT | Mod: 25 | Performed by: FAMILY MEDICINE

## 2022-10-17 PROCEDURE — 91313 COVID-19,PF,MODERNA BIVALENT: CPT | Performed by: FAMILY MEDICINE

## 2022-10-17 PROCEDURE — 80053 COMPREHEN METABOLIC PANEL: CPT | Performed by: FAMILY MEDICINE

## 2022-10-17 RX ORDER — GABAPENTIN 300 MG/1
CAPSULE ORAL
Qty: 90 CAPSULE | Refills: 3 | Status: SHIPPED | OUTPATIENT
Start: 2022-10-17 | End: 2023-01-20

## 2022-10-17 RX ORDER — ATORVASTATIN CALCIUM 20 MG/1
20 TABLET, FILM COATED ORAL DAILY
Qty: 90 TABLET | Refills: 3 | Status: SHIPPED | OUTPATIENT
Start: 2022-10-17 | End: 2023-10-24

## 2022-10-17 RX ORDER — AMLODIPINE AND BENAZEPRIL HYDROCHLORIDE 10; 40 MG/1; MG/1
CAPSULE ORAL
Qty: 90 CAPSULE | Refills: 3 | Status: SHIPPED | OUTPATIENT
Start: 2022-10-17 | End: 2023-10-24

## 2022-10-17 SDOH — ECONOMIC STABILITY: INCOME INSECURITY: HOW HARD IS IT FOR YOU TO PAY FOR THE VERY BASICS LIKE FOOD, HOUSING, MEDICAL CARE, AND HEATING?: NOT HARD AT ALL

## 2022-10-17 SDOH — ECONOMIC STABILITY: FOOD INSECURITY: WITHIN THE PAST 12 MONTHS, THE FOOD YOU BOUGHT JUST DIDN'T LAST AND YOU DIDN'T HAVE MONEY TO GET MORE.: NEVER TRUE

## 2022-10-17 SDOH — ECONOMIC STABILITY: TRANSPORTATION INSECURITY
IN THE PAST 12 MONTHS, HAS LACK OF TRANSPORTATION KEPT YOU FROM MEETINGS, WORK, OR FROM GETTING THINGS NEEDED FOR DAILY LIVING?: NO

## 2022-10-17 SDOH — HEALTH STABILITY: PHYSICAL HEALTH: ON AVERAGE, HOW MANY DAYS PER WEEK DO YOU ENGAGE IN MODERATE TO STRENUOUS EXERCISE (LIKE A BRISK WALK)?: 5 DAYS

## 2022-10-17 SDOH — ECONOMIC STABILITY: INCOME INSECURITY: IN THE LAST 12 MONTHS, WAS THERE A TIME WHEN YOU WERE NOT ABLE TO PAY THE MORTGAGE OR RENT ON TIME?: NO

## 2022-10-17 SDOH — ECONOMIC STABILITY: TRANSPORTATION INSECURITY
IN THE PAST 12 MONTHS, HAS THE LACK OF TRANSPORTATION KEPT YOU FROM MEDICAL APPOINTMENTS OR FROM GETTING MEDICATIONS?: NO

## 2022-10-17 SDOH — ECONOMIC STABILITY: FOOD INSECURITY: WITHIN THE PAST 12 MONTHS, YOU WORRIED THAT YOUR FOOD WOULD RUN OUT BEFORE YOU GOT MONEY TO BUY MORE.: SOMETIMES TRUE

## 2022-10-17 SDOH — HEALTH STABILITY: PHYSICAL HEALTH: ON AVERAGE, HOW MANY MINUTES DO YOU ENGAGE IN EXERCISE AT THIS LEVEL?: 40 MIN

## 2022-10-17 ASSESSMENT — SOCIAL DETERMINANTS OF HEALTH (SDOH)
HOW OFTEN DO YOU GET TOGETHER WITH FRIENDS OR RELATIVES?: ONCE A WEEK
DO YOU BELONG TO ANY CLUBS OR ORGANIZATIONS SUCH AS CHURCH GROUPS UNIONS, FRATERNAL OR ATHLETIC GROUPS, OR SCHOOL GROUPS?: NO
IN A TYPICAL WEEK, HOW MANY TIMES DO YOU TALK ON THE PHONE WITH FAMILY, FRIENDS, OR NEIGHBORS?: MORE THAN THREE TIMES A WEEK
HOW OFTEN DO YOU ATTEND CHURCH OR RELIGIOUS SERVICES?: NEVER

## 2022-10-17 ASSESSMENT — ENCOUNTER SYMPTOMS
SORE THROAT: 0
FREQUENCY: 0
COUGH: 0
ABDOMINAL PAIN: 0
DIZZINESS: 0
HEMATOCHEZIA: 0
HEADACHES: 0
SHORTNESS OF BREATH: 0
PALPITATIONS: 0
MYALGIAS: 0
DIARRHEA: 0
ARTHRALGIAS: 0
CONSTIPATION: 0
HEMATURIA: 0
WEAKNESS: 0
FEVER: 0
JOINT SWELLING: 0
NERVOUS/ANXIOUS: 0
HEARTBURN: 0
PARESTHESIAS: 0
NAUSEA: 0
DYSURIA: 1
EYE PAIN: 0
CHILLS: 0

## 2022-10-17 ASSESSMENT — LIFESTYLE VARIABLES
SKIP TO QUESTIONS 9-10: 1
HOW OFTEN DO YOU HAVE A DRINK CONTAINING ALCOHOL: MONTHLY OR LESS
HOW MANY STANDARD DRINKS CONTAINING ALCOHOL DO YOU HAVE ON A TYPICAL DAY: PATIENT DOES NOT DRINK
AUDIT-C TOTAL SCORE: 1
HOW OFTEN DO YOU HAVE SIX OR MORE DRINKS ON ONE OCCASION: NEVER

## 2022-10-17 ASSESSMENT — ACTIVITIES OF DAILY LIVING (ADL): CURRENT_FUNCTION: NO ASSISTANCE NEEDED

## 2022-10-17 ASSESSMENT — PAIN SCALES - GENERAL: PAINLEVEL: NO PAIN (0)

## 2023-01-20 ENCOUNTER — TELEPHONE (OUTPATIENT)
Dept: FAMILY MEDICINE | Facility: CLINIC | Age: 68
End: 2023-01-20
Payer: OTHER GOVERNMENT

## 2023-01-20 DIAGNOSIS — G50.0 TRIGEMINAL NEURALGIA: ICD-10-CM

## 2023-01-20 RX ORDER — GABAPENTIN 300 MG/1
CAPSULE ORAL
Qty: 270 CAPSULE | Refills: 2 | Status: SHIPPED | OUTPATIENT
Start: 2023-01-20 | End: 2023-01-24

## 2023-01-20 NOTE — TELEPHONE ENCOUNTER
Medication Question or Refill    Contacts       Type Contact Phone/Fax    01/20/2023 09:54 AM CST Phone (Incoming) Nu Taylor (Self) [phone] (M)          What medication are you calling about (include dose and sig)?: gabapentin (NEURONTIN) 300 MG capsule    Controlled Substance Agreement on file:   CSA -- Patient Level:    CSA: None found at the patient level.       Who prescribed the medication?:Dr. Andrés Pisano    Do you need a refill? Yes:     When did you use the medication last? daily    Patient offered an appointment? No    Do you have any questions or concerns?  Yes: Patient is requesting for to change the RX to a 90 supply, She takes 3 a day and running to the pharmacy monthly isn't easy for her?    Preferred Pharmacy:   [facility]  Ph:[phone]  Address:[address] [facility] [address]    Okay to leave a detailed message?: Yes at Cell number on file:    Telephone Information:   Mobile [phone]       Lavinia Puga   [facility]  Central Scheduler

## 2023-01-20 NOTE — TELEPHONE ENCOUNTER
Most recent nurse triage note reviewed. Requested medication refilled for 90 days with 2 refills, enough to last until yearly prevented health exam.

## 2023-01-20 NOTE — TELEPHONE ENCOUNTER
Pt requesting 90 day supply of gabapentin instead of 30 days. Pt takes 3 capsules daily, please advise.     gabapentin (NEURONTIN) 300 MG capsule 90 capsule 3 10/17/2022  No   Sig: TAKE 1 CAPSULE(300 MG) BY MOUTH THREE TIMES DAILY   Sent to pharmacy as: Gabapentin 300 MG Oral Capsule (NEURONTIN)   Class: E-Prescribe   Order: [id number]   E-Prescribing Status: Receipt confirmed by pharmacy (10/17/2022 11:11 AM CDT)     Pt states she is in [place] for 6 months, pharmacy is pended.     Leanne NEFF RN

## 2023-05-28 NOTE — TELEPHONE ENCOUNTER
Pending Prescriptions:                       Disp   Refills    simvastatin (ZOCOR) 20 MG tablet [Pharmac*90 tab*0            Sig: TAKE 1 TABLET BY MOUTH ONCE DAILY IN THE EVENING           Last Written Prescription Date: 3/17/2016  Last Fill Quantity: 90, # refills: 3  Last Office Visit with FMHOWIE, FOSTERP or [facility] prescribing provider: 3/31/2017, Víctor       Lab Results   Component Value Date    CHOL 198 04/03/2015     Lab Results   Component Value Date    HDL 73 04/03/2015     Lab Results   Component Value Date     04/03/2015     Lab Results   Component Value Date    TRIG 47 04/03/2015     Lab Results   Component Value Date    CHOLHDLRATIO 2.7 04/03/2015       
Routing refill request to provider for review/approval because:  Labs not current:  Last lipids 4/3/15  Please advise if OV or fasting lab only?   Tim Esquivel RN          
Improved

## 2023-07-01 ENCOUNTER — ANCILLARY PROCEDURE (OUTPATIENT)
Dept: MAMMOGRAPHY | Facility: CLINIC | Age: 68
End: 2023-07-01
Payer: COMMERCIAL

## 2023-07-01 DIAGNOSIS — Z12.31 VISIT FOR SCREENING MAMMOGRAM: ICD-10-CM

## 2023-07-01 PROCEDURE — 77067 SCR MAMMO BI INCL CAD: CPT | Mod: TC | Performed by: RADIOLOGY

## 2023-07-01 PROCEDURE — 77063 BREAST TOMOSYNTHESIS BI: CPT | Mod: TC | Performed by: RADIOLOGY

## 2023-07-10 NOTE — ASSESSMENT & PLAN NOTE
- Titrate carbamazepine up to 600 mg bid.  - Increase baclofen to qid.  - Not using norco, will save for at night if needed.  - If having too much pain when talking at work, note provided stating she might need to miss the next three days.   Biopsy (Gynecological)    Date/Time: 7/10/2023 1:00 PM  Performed by: Corine Humphreys MD  Authorized by: Corine Humphreys MD     Consent Done?:  Yes (Written)  Local anesthesia used?: Yes    Anesthesia:  Local infiltration  Local anesthetic:  Lidocaine 1% with epinephrine  Anesthetic total (ml):  2    Biopsy Location:  Vulva  Vulva:     # of lesions:  1  Estimated blood loss (cc):  1   Patient tolerated the procedure well with no immediate complications.     After cleansing area with alcohol pad x 3, local block performed. Area then prepped with betadine x 3.  Anesthesia tested and found to be adequate. With sterile technique, a 3 mm circumferential skin tag of left labia majora was grasped with pick ups and excised at the base with scissors.  Silver nitrate was applied for hemostasis which is satisfactory.   Local care, hygiene practices discussed.    Specimen placed in formalin and sent to pathology.     Post-procedure counseling, expectations reviewed. Let us know if any issues arise.

## 2023-08-16 ENCOUNTER — NURSE TRIAGE (OUTPATIENT)
Dept: NURSING | Facility: CLINIC | Age: 68
End: 2023-08-16
Payer: OTHER GOVERNMENT

## 2023-08-16 NOTE — TELEPHONE ENCOUNTER
Coming in October for annual exam then another appointment. Had two cysts, small. One size of thumb nail and small finger nail. One on back on neck. Can't leave it alone. She squeezes it and white stuff comes out. Other on left breast and squeezes it every month, some white stuff that smells comes out.    Right on right side, above hip at waist, it's about the length of finger, almost the size of hand, 2 1/2 inches in length. It has gone away, started walking again, actually noticed it a couple months ago was going to has another appointment half an hour later, should be checked. It's soft, squishy. No pain to it even squishing it, really soft like jelly.  I connected her with scheduling for an appointment and advised urgent care if they can't get her in. I confirmed the [place] location hours.  Lakshmi Coto RN  [facility]    Reason for Disposition   Boil > 1/2 inch across (> 12 mm; larger than a marble) and center is soft or pus colored    Additional Information   Negative: Widespread rash and bright red, sunburn-like and too weak to stand   Negative: Sounds like a life-threatening emergency to the triager   Negative: Painful lump or swelling at opening to anus (rectum)   Negative: Painful lump or swelling at opening to vagina (on labia)   Negative: Painful lump or swelling on scrotum   Negative: Doesn't match the SYMPTOMS of a boil   Negative: Widespread red rash   Negative: Black (necrotic) color or blisters develop in wound   Negative: Patient sounds very sick or weak to the triager   Negative: SEVERE pain (e.g., excruciating)   Negative: Red streak from area of infection   Negative: Fever > 100.4 F (38.0 C)   Negative: Boil > 2 inches across (> 5 cm; larger than a golf ball or ping pong ball)    Protocols used: Boil (Skin Abscess)-A-OH

## 2023-09-08 ENCOUNTER — TELEPHONE (OUTPATIENT)
Dept: NEUROSURGERY | Facility: CLINIC | Age: 68
End: 2023-09-08
Payer: OTHER GOVERNMENT

## 2023-09-08 NOTE — TELEPHONE ENCOUNTER
[facility] Call Center    Phone Message    May a detailed message be left on voicemail: yes     Reason for Call: Other: Pt is calling and stating that her memory is not doing good Pt is stating that she got into the car the other day and she know where she was going but when she got into the car she had no idea where she was going and she has been there many times she had to pull over and think about it and then she remembered where she was going and she was 2 blocks away. Pt is wanting to know if it is the medication she is taking. Pt stated that it is getting worse where she is not remembering things. Pt would like to talk to someone about this. Please call Pt back to discuss.      Action Taken: Message routed to:  [facility] Neurosurgery    Travel Screening: Not Applicable

## 2023-09-14 ENCOUNTER — OFFICE VISIT (OUTPATIENT)
Dept: FAMILY MEDICINE | Facility: CLINIC | Age: 68
End: 2023-09-14
Payer: COMMERCIAL

## 2023-09-14 VITALS
SYSTOLIC BLOOD PRESSURE: 153 MMHG | TEMPERATURE: 98 F | OXYGEN SATURATION: 98 % | HEART RATE: 89 BPM | HEIGHT: 61 IN | BODY MASS INDEX: 28.13 KG/M2 | WEIGHT: 149 LBS | RESPIRATION RATE: 16 BRPM | DIASTOLIC BLOOD PRESSURE: 80 MMHG

## 2023-09-14 DIAGNOSIS — R41.3 MEMORY DIFFICULTY: Primary | ICD-10-CM

## 2023-09-14 DIAGNOSIS — L29.9 ITCHING: ICD-10-CM

## 2023-09-14 DIAGNOSIS — L72.9 CUTANEOUS CYST: ICD-10-CM

## 2023-09-14 PROCEDURE — 82746 ASSAY OF FOLIC ACID SERUM: CPT | Performed by: FAMILY MEDICINE

## 2023-09-14 PROCEDURE — 36415 COLL VENOUS BLD VENIPUNCTURE: CPT | Performed by: FAMILY MEDICINE

## 2023-09-14 PROCEDURE — 82607 VITAMIN B-12: CPT | Performed by: FAMILY MEDICINE

## 2023-09-14 PROCEDURE — 99214 OFFICE O/P EST MOD 30 MIN: CPT | Performed by: FAMILY MEDICINE

## 2023-09-14 RX ORDER — TRIAMCINOLONE ACETONIDE 1 MG/G
CREAM TOPICAL 2 TIMES DAILY
Qty: 30 G | Refills: 0 | Status: SHIPPED | OUTPATIENT
Start: 2023-09-14 | End: 2023-09-21

## 2023-09-14 ASSESSMENT — ENCOUNTER SYMPTOMS: FEVER: 0

## 2023-09-14 NOTE — TELEPHONE ENCOUNTER
Spoke with patient.    Patient states having issues with directions and where she is going and what she was trying to do.  Patient states having issues remembering what she is doing or wants to do.    Patient is on Carbazepine and Gabapentin for Trigeminal Neuralgia.      Recommended check with PCP regarding Neurology consult.

## 2023-09-14 NOTE — LETTER
September 18, 2023      Nu Taylor  [id number] [address] [address]        Dear ,    We are writing to inform you of your test results.    Your test results fall within the expected range(s) or remain unchanged from previous results.  Please continue with current treatment plan.    Resulted Orders   Vitamin B12   Result Value Ref Range    Vitamin B12 582 232 - 1,245 pg/mL   Folate   Result Value Ref Range    Folic Acid 19.7 4.6 - 34.8 ng/mL       If you have any questions or concerns, please call the clinic at the number listed above.       Sincerely,      Reymundo Travis MD

## 2023-09-14 NOTE — PROGRESS NOTES
Assessment & Plan     Memory difficulty  Labs okay, recommend MRI and neurology evaluation.  - Adult Neurology  Referral  - MR Brain w/o Contrast    Cutaneous cyst  Asymptomatic, reassured    Itching  Start topical Kenalog on for generalized upper back pruritus been refractory to hydrocortisone.  - triamcinolone (KENALOG) 0.1 % external cream  Dispense: 30 g; Refill: 0        Reymundo Travis MD  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  has been squeezing them and getting discharge.)  Symptom onset:  More than a month  Symptoms include:  Memory loss alot of forgetting and driving forgetting where the place was  Symptom intensity:  Mild  Symptom progression:  Worsening  Had these symptoms before:  No  What makes it worse:  No  What makes it better:  I dont know    She eats 4 or more servings of fruits and vegetables daily.She consumes 1 sweetened beverage(s) daily.She exercises with enough effort to increase her heart rate 60 or more minutes per day.  She exercises with enough effort to increase her heart rate 6 days per week.   She is taking medications regularly.       Rash  Onset/Duration: 1 year ago  Description  Location: right side of shoulder blade  Character: hot to the touch  Itching: severe  Intensity:  severe  Progression of Symptoms:  worsening and intermittent  Accompanying signs and symptoms:   Fever: No  Body aches or joint pain: No  Sore throat symptoms: No  Recent cold symptoms: No  History:           Previous episodes of similar rash: None  New  "exposures:  None  Recent travel: No  Exposure to similar rash: No  Precipitating or alleviating factors: none  Therapies tried and outcome: rodriguez ointment      Patient presents for concerns of lumps, memory difficulty and itching on her upper back    Review of Systems   Constitutional:  Negative for fever.            Objective    BP (!) 153/80   Pulse 89   Temp 98  F (36.7  C)   Resp 16   Ht 1.549 m (5' 1\")   Wt 67.6 kg (149 lb)   LMP 10/22/2008 (Exact Date)   SpO2 98%   BMI 28.15 kg/m    Body mass index is 28.15 kg/m .  Physical Exam  Cardiovascular:      Rate and Rhythm: Normal rate and regular rhythm.   Pulmonary:      Effort: Pulmonary effort is normal.      Breath sounds: Normal breath sounds.   Skin:     Comments: A couple small nontender subcutaneous cysts over her upper back and central chest                              "

## 2023-09-15 LAB
FOLATE SERPL-MCNC: 19.7 NG/ML (ref 4.6–34.8)
VIT B12 SERPL-MCNC: 582 PG/ML (ref 232–1245)

## 2023-10-24 ENCOUNTER — OFFICE VISIT (OUTPATIENT)
Dept: FAMILY MEDICINE | Facility: CLINIC | Age: 68
End: 2023-10-24
Payer: COMMERCIAL

## 2023-10-24 VITALS
RESPIRATION RATE: 16 BRPM | BODY MASS INDEX: 27.06 KG/M2 | HEART RATE: 87 BPM | DIASTOLIC BLOOD PRESSURE: 88 MMHG | WEIGHT: 143.3 LBS | SYSTOLIC BLOOD PRESSURE: 146 MMHG | OXYGEN SATURATION: 96 % | HEIGHT: 61 IN | TEMPERATURE: 98.3 F

## 2023-10-24 DIAGNOSIS — E78.00 HYPERCHOLESTEROLEMIA: ICD-10-CM

## 2023-10-24 DIAGNOSIS — H61.23 BILATERAL IMPACTED CERUMEN: ICD-10-CM

## 2023-10-24 DIAGNOSIS — E78.2 MIXED HYPERLIPIDEMIA: ICD-10-CM

## 2023-10-24 DIAGNOSIS — Z00.00 ENCOUNTER FOR MEDICARE ANNUAL WELLNESS EXAM: Primary | ICD-10-CM

## 2023-10-24 DIAGNOSIS — I10 BENIGN ESSENTIAL HYPERTENSION: ICD-10-CM

## 2023-10-24 PROCEDURE — G0008 ADMIN INFLUENZA VIRUS VAC: HCPCS | Performed by: NURSE PRACTITIONER

## 2023-10-24 PROCEDURE — 90662 IIV NO PRSV INCREASED AG IM: CPT | Performed by: NURSE PRACTITIONER

## 2023-10-24 PROCEDURE — G0439 PPPS, SUBSEQ VISIT: HCPCS | Performed by: NURSE PRACTITIONER

## 2023-10-24 PROCEDURE — 69210 REMOVE IMPACTED EAR WAX UNI: CPT | Performed by: NURSE PRACTITIONER

## 2023-10-24 PROCEDURE — 99214 OFFICE O/P EST MOD 30 MIN: CPT | Mod: 25 | Performed by: NURSE PRACTITIONER

## 2023-10-24 RX ORDER — AMLODIPINE AND BENAZEPRIL HYDROCHLORIDE 10; 40 MG/1; MG/1
CAPSULE ORAL
Qty: 90 CAPSULE | Refills: 3 | Status: SHIPPED | OUTPATIENT
Start: 2023-10-24 | End: 2024-09-16

## 2023-10-24 RX ORDER — RESPIRATORY SYNCYTIAL VIRUS VACCINE 120MCG/0.5
0.5 KIT INTRAMUSCULAR ONCE
Qty: 1 EACH | Refills: 0 | Status: CANCELLED | OUTPATIENT
Start: 2023-10-24 | End: 2023-10-24

## 2023-10-24 RX ORDER — ATORVASTATIN CALCIUM 20 MG/1
20 TABLET, FILM COATED ORAL DAILY
Qty: 90 TABLET | Refills: 3 | Status: SHIPPED | OUTPATIENT
Start: 2023-10-24 | End: 2024-08-26

## 2023-10-24 RX ORDER — CHLORTHALIDONE 25 MG/1
12.5 TABLET ORAL DAILY
Qty: 45 TABLET | Refills: 1 | Status: SHIPPED | OUTPATIENT
Start: 2023-10-24 | End: 2023-11-29

## 2023-10-24 ASSESSMENT — PAIN SCALES - GENERAL: PAINLEVEL: NO PAIN (0)

## 2023-10-24 NOTE — PATIENT INSTRUCTIONS
Patient Education   Personalized Prevention Plan  You are due for the preventive services outlined below.  Your care team is available to assist you in scheduling these services.  If you have already completed any of these items, please share that information with your care team to update in your medical record.  Health Maintenance Due   Topic Date Due     RSV VACCINE 60+ (1 - 1-dose 60+ series) Never done     Pneumococcal Vaccine (2 - PCV) 09/07/2022     Flu Vaccine (1) 09/01/2023     COVID-19 Vaccine (5 - 2023-24 season) 09/01/2023     Cholesterol Lab  10/17/2023     Annual Wellness Visit  10/18/2023

## 2023-10-24 NOTE — PROGRESS NOTES
SUBJECTIVE:   Nu is a 68 year old who presents for Preventive Visit.      10/24/2023    10:46 AM   Additional Questions   Roomed by TED Haley   Accompanied by Self       Are you in the first 12 months of your Medicare coverage?  No    HPI        Have you ever done Advance Care Planning? (For example, a Health Directive, POLST, or a discussion with a medical provider or your loved ones about your wishes): No, advance care planning information given to patient to review.  Patient plans to discuss their wishes with loved ones or provider.       Fall risk  Fallen 2 or more times in the past year?: No  Any fall with injury in the past year?: No    Cognitive Screening   1) Repeat 3 items (Leader, Season, Table)    2) Clock draw: ABNORMAL    3) 3 item recall: Recalls 2 objects   Results: ABNORMAL clock, 1-2 items recalled: PROBABLE COGNITIVE IMPAIRMENT, **INFORM PROVIDER**    Mini-CogTM Copyright [employer]. Licensed by the author for use in [facility]; reprinted with permission ([email or web address]@[email or web address]). All rights reserved.    Breast cancer screening:  last mammogram 7/1/2023  Colon cancer screening:  last colonoscopy 10/2020, due every 5 years due to polyp  Cervical cancer screening:  total hysterectomy, no further cervical cancer screening needed.   Trigeminal neuralgia:  pain controlled with tegretol and gabapentin  HTN: taking amlodipine 10 mg and benazepril 40 mg daily,checking BP at home, elevated at 144/88.  Has had weight loss of 6 lb since last visit and increased exercise.     Do you have sleep apnea, excessive snoring or daytime drowsiness? : no    Reviewed and updated as needed this visit by clinical staff   Tobacco  Allergies  Meds              Reviewed and updated as needed this visit by Provider                 Social History     Tobacco Use    Smoking status: Never    Smokeless tobacco: Never   Substance Use Topics    Alcohol use: Yes     Alcohol/week: 0.0 standard drinks of alcohol      Comment: occ         10/17/2022     9:50 AM   Alcohol Use   Prescreen: >3 drinks/day or >7 drinks/week? No     Do you have a current opioid prescription? No  Do you use any other controlled substances or medications that are not prescribed by a provider? None  {Provider          Current providers sharing in care for this patient include:   Patient Care Team:  Andrés Pisano DO as PCP - General (Family Medicine)  Edmund Mccarthy MD as Assigned OBGYN Provider  Andrés Pisano DO as Assigned PCP  Cecy Jernigan APRN CNP as Assigned Neuroscience Provider    The following health maintenance items are reviewed in Epic and correct as of today:  Health Maintenance   Topic Date Due    RSV VACCINE 60+ (1 - 1-dose 60+ series) Never done    Pneumococcal Vaccine: 65+ Years (2 - PCV) 09/07/2022    INFLUENZA VACCINE (1) 09/01/2023    COVID-19 Vaccine (5 - 2023-24 season) 09/01/2023    LIPID  10/17/2023    MEDICARE ANNUAL WELLNESS VISIT  10/18/2023    ANNUAL REVIEW OF HM ORDERS  09/14/2024    FALL RISK ASSESSMENT  10/24/2024    DTAP/TDAP/TD IMMUNIZATION (2 - Td or Tdap) 04/03/2025    MAMMO SCREENING  07/01/2025    COLORECTAL CANCER SCREENING  10/30/2025    ADVANCE CARE PLANNING  09/07/2026    DEXA  05/26/2030    HEPATITIS C SCREENING  Completed    PHQ-2 (once per calendar year)  Completed    ZOSTER IMMUNIZATION  Completed    IPV IMMUNIZATION  Aged Out    HPV IMMUNIZATION  Aged Out    MENINGITIS IMMUNIZATION  Aged Out    PAP  Discontinued     Labs reviewed in EPIC  BP Readings from Last 3 Encounters:   10/24/23 (!) 146/88   09/14/23 (!) 153/80   10/17/22 128/76    Wt Readings from Last 3 Encounters:   10/24/23 65 kg (143 lb 4.8 oz)   09/14/23 67.6 kg (149 lb)   10/17/22 74.8 kg (165 lb)                  Patient Active Problem List   Diagnosis    Actinic keratosis    Hallux rigidus    Hypercholesterolemia    Advanced directives, counseling/discussion    Mitral valve stenosis, mild    Urinary frequency    DDD (degenerative  disc disease), cervical    Seasonal allergic rhinitis    Trigeminal neuralgia    Complete tear of right rotator cuff    Benign essential hypertension    Cystocele, midline    Mixed stress and urge urinary incontinence    Intrinsic sphincter deficiency     Past Surgical History:   Procedure Laterality Date    C section with Tubal ligation      LAPAROSCOPIC SUSPENSION BLADDER NECK         Social History     Tobacco Use    Smoking status: Never    Smokeless tobacco: Never   Substance Use Topics    Alcohol use: Yes     Alcohol/week: 0.0 standard drinks of alcohol     Comment: occ     Family History   Problem Relation Age of Onset    No Known Problems Mother     No Known Problems Father     Leukemia Maternal Grandmother     Diabetes Son         type 1    Family History Negative No family hx of     C.A.D. No family hx of     Cancer - colorectal No family hx of          Current Outpatient Medications   Medication Sig Dispense Refill    amLODIPine-benazepril (LOTREL) 10-40 MG capsule Take 1 capsule by mouth once daily 90 capsule 3    atorvastatin (LIPITOR) 20 MG tablet Take 1 tablet (20 mg) by mouth daily 90 tablet 3    carBAMazepine (TEGRETOL) 200 MG tablet TAKE 3 TABLETS(600 MG) BY MOUTH TWICE DAILY 540 tablet 1    chlorthalidone (HYGROTON) 25 MG tablet Take 0.5 tablets (12.5 mg) by mouth daily 45 tablet 1    gabapentin (NEURONTIN) 300 MG capsule TAKE 1 CAPSULE(300 MG) BY MOUTH THREE TIMES DAILY 270 capsule 1    Multiple Vitamins-Minerals (CENTRUM PO)        Allergies   Allergen Reactions    Norco [Hydrocodone-Acetaminophen] Nausea and Vomiting             10/17/2022     9:59 AM   Breast CA Risk Assessment (FHS-7)   Do you have a family history of breast, colon, or ovarian cancer? No / Unknown   Pertinent mammograms are reviewed under the imaging tab.    Review of Systems  CONSTITUTIONAL: NEGATIVE for fever, chills, change in weight  INTEGUMENTARY/SKIN: NEGATIVE for worrisome rashes, moles or lesions  EYES: NEGATIVE for  "vision changes or irritation  ENT/MOUTH: NEGATIVE for ear, mouth and throat problems  RESP: NEGATIVE for significant cough or SOB  BREAST: NEGATIVE for masses, tenderness or discharge  CV: NEGATIVE for chest pain, palpitations or peripheral edema  GI: NEGATIVE for nausea, abdominal pain, heartburn, or change in bowel habits  : NEGATIVE for frequency, dysuria, or hematuria  MUSCULOSKELETAL: NEGATIVE for significant arthralgias or myalgia  NEURO: NEGATIVE for weakness, dizziness or paresthesias  ENDOCRINE: NEGATIVE for temperature intolerance, skin/hair changes  HEME: NEGATIVE for bleeding problems  PSYCHIATRIC: NEGATIVE for changes in mood or affect    OBJECTIVE:   BP (!) 144/88 (BP Location: Right arm, Patient Position: Sitting, Cuff Size: Adult Regular)   Pulse 87   Temp 98.3  F (36.8  C) (Oral)   Resp 16   Ht 1.549 m (5' 1\")   Wt 65 kg (143 lb 4.8 oz)   LMP 10/22/2008 (Exact Date)   SpO2 96%   Breastfeeding No   BMI 27.08 kg/m   Estimated body mass index is 27.08 kg/m  as calculated from the following:    Height as of this encounter: 1.549 m (5' 1\").    Weight as of this encounter: 65 kg (143 lb 4.8 oz).  Physical Exam  GENERAL: healthy, alert  EYES: Eyes grossly normal to inspection,   HENT: ear canals with cerumen impaction. After irrigation  TM's normal, nose and mouth without ulcers or lesions  NECK: no adenopathy, no asymmetry, masses, or scars and thyroid normal to palpation  RESP: lungs clear to auscultation - no rales, rhonchi or wheezes  BREAST: normal without masses, tenderness or nipple discharge and no palpable axillary masses or adenopathy  CV: regular rate and rhythm, normal S1 S2, no S3 or S4, no murmur, click or rub, no peripheral edema   ABDOMEN: soft, nontender, no hepatosplenomegaly, no masses and bowel sounds normal  MS: no gross musculoskeletal defects noted, no edema  SKIN: no suspicious lesions or rashes  NEURO: Normal strength and tone, mentation intact and speech normal  PSYCH: " "mentation appears normal, affect normal/bright    Diagnostic Test Results:  Labs reviewed in Epic    ASSESSMENT / PLAN:   Nu was seen today for medicare visit.    Diagnoses and all orders for this visit:    Encounter for Medicare annual wellness exam    Benign essential hypertension:  /88, elevated at home as well.  Uncontrolled, will continue Lotrel at current dose, add on chlorthalidone 12.5 mg daily in the am.  Follow up in 6 weeks for clinic visit.   -     chlorthalidone (HYGROTON) 25 MG tablet; Take 0.5 tablets (12.5 mg) by mouth daily  -     amLODIPine-benazepril (LOTREL) 10-40 MG capsule; Take 1 capsule by mouth once daily    Bilateral impacted cerumen  -     REMOVE IMPACTED CERUMEN  Cerumen impaction:  Using warm tap water and curette cerumen removed from bilateral external ears, after removal TM intact without erythema.     Mixed hyperlipidemia:  in 5/2023, continue on atorvastatin 20 mg daily.   -     atorvastatin (LIPITOR) 20 MG tablet; Take 1 tablet (20 mg) by mouth daily    Other orders  -     INFLUENZA VACCINE 65+ (FLUZONE HD)  -     PRIMARY CARE FOLLOW-UP SCHEDULING; Future      COUNSELING:  Reviewed preventive health counseling, as reflected in patient instructions       Regular exercise       Healthy diet/nutrition       Immunizations  Vaccinated for: Influenza        BMI:   Estimated body mass index is 27.08 kg/m  as calculated from the following:    Height as of this encounter: 1.549 m (5' 1\").    Weight as of this encounter: 65 kg (143 lb 4.8 oz).         She reports that she has never smoked. She has never used smokeless tobacco.      Appropriate preventive services were discussed with this patient, including applicable screening as appropriate for fall prevention, nutrition, physical activity, Tobacco-use cessation, weight loss and cognition.  Checklist reviewing preventive services available has been given to the patient.    Reviewed patients plan of care and provided an AVS. " The Basic Care Plan (routine screening as documented in Health Maintenance) for Nu meets the Care Plan requirement. This Care Plan has been established and reviewed with the Patient.        Susan Haase, APRN CNP  [facility]    Identified Health Risks:  I have reviewed Opioid Use Disorder and Substance Use Disorder risk factors and made any needed referrals.

## 2023-11-11 ENCOUNTER — HOSPITAL ENCOUNTER (OUTPATIENT)
Dept: MRI IMAGING | Facility: CLINIC | Age: 68
Discharge: HOME | End: 2023-11-11
Attending: FAMILY MEDICINE | Admitting: FAMILY MEDICINE
Payer: COMMERCIAL

## 2023-11-11 DIAGNOSIS — R41.3 MEMORY DIFFICULTY: ICD-10-CM

## 2023-11-11 PROCEDURE — 70551 MRI BRAIN STEM W/O DYE: CPT

## 2023-11-13 ENCOUNTER — TELEPHONE (OUTPATIENT)
Dept: FAMILY MEDICINE | Facility: CLINIC | Age: 68
End: 2023-11-13
Payer: OTHER GOVERNMENT

## 2023-11-13 NOTE — TELEPHONE ENCOUNTER
Patient is asking that the MRI from 11/11 be send to the neurologist/Dr. Carlisle.       Advised patient of # to call so MRI can be pushed to Dr. Carlisle- [phone] told then to call Dr. Carlisle office to follow up for recommendations from neurology.     Advised Dr. Travis's staff will call back her regarding the results and recommendations   Patient is open to an appointment if needed to discuss

## 2023-11-29 ENCOUNTER — OFFICE VISIT (OUTPATIENT)
Dept: FAMILY MEDICINE | Facility: CLINIC | Age: 68
End: 2023-11-29
Payer: COMMERCIAL

## 2023-11-29 VITALS
RESPIRATION RATE: 14 BRPM | DIASTOLIC BLOOD PRESSURE: 77 MMHG | HEIGHT: 61 IN | HEART RATE: 95 BPM | OXYGEN SATURATION: 99 % | TEMPERATURE: 97.9 F | BODY MASS INDEX: 27 KG/M2 | WEIGHT: 143 LBS | SYSTOLIC BLOOD PRESSURE: 122 MMHG

## 2023-11-29 DIAGNOSIS — I10 BENIGN ESSENTIAL HYPERTENSION: Primary | ICD-10-CM

## 2023-11-29 DIAGNOSIS — E78.00 HYPERCHOLESTEROLEMIA: ICD-10-CM

## 2023-11-29 PROCEDURE — 99214 OFFICE O/P EST MOD 30 MIN: CPT | Performed by: NURSE PRACTITIONER

## 2023-11-29 RX ORDER — CHLORTHALIDONE 25 MG/1
12.5 TABLET ORAL DAILY
Qty: 45 TABLET | Refills: 1 | Status: SHIPPED | OUTPATIENT
Start: 2023-11-29 | End: 2024-06-28

## 2023-11-29 NOTE — LETTER
December 7, 2023      Nu Taylor  [id number] [address] [id number]        Dear ,    We are writing to inform you of your test results.    Your electrolytes (sodium, potassium) are normal.  Glucose is normal at 98. Your tegretol level is also normal at 8.2.     Resulted Orders   Carbamazepine total   Result Value Ref Range    Carbamazepine 8.2 4.0 - 12.0 ug/mL   Comprehensive metabolic panel (BMP + Alb, Alk Phos, ALT, AST, Total. Bili, TP)   Result Value Ref Range    Sodium 136 135 - 145 mmol/L      Comment:      Reference intervals for this test were updated on 09/26/2023 to more accurately reflect our healthy population. There may be differences in the flagging of prior results with similar values performed with this method. Interpretation of those prior results can be made in the context of the updated reference intervals.     Potassium 4.0 3.4 - 5.3 mmol/L    Carbon Dioxide (CO2) 29 22 - 29 mmol/L    Anion Gap 8 7 - 15 mmol/L    Urea Nitrogen 13.5 8.0 - 23.0 mg/dL    Creatinine 0.66 0.51 - 0.95 mg/dL    GFR Estimate >90 >60 mL/min/1.73m2    Calcium 9.2 8.8 - 10.2 mg/dL    Chloride 99 98 - 107 mmol/L    Glucose 98 70 - 99 mg/dL    Alkaline Phosphatase 119 40 - 150 U/L      Comment:      Reference intervals for this test were updated on 11/14/2023 to more accurately reflect our healthy population. There may be differences in the flagging of prior results with similar values performed with this method. Interpretation of those prior results can be made in the context of the updated reference intervals.    AST 21 0 - 45 U/L      Comment:      Reference intervals for this test were updated on 6/12/2023 to more accurately reflect our healthy population. There may be differences in the flagging of prior results with similar values performed with this method. Interpretation of those prior results can be made in the context of the updated reference intervals.    ALT 17 0 - 50 U/L      Comment:       Reference intervals for this test were updated on 6/12/2023 to more accurately reflect our healthy population. There may be differences in the flagging of prior results with similar values performed with this method. Interpretation of those prior results can be made in the context of the updated reference intervals.      Protein Total 6.9 6.4 - 8.3 g/dL    Albumin 4.2 3.5 - 5.2 g/dL    Bilirubin Total 0.2 <=1.2 mg/dL       If you have any questions or concerns, please call the clinic at the number listed above.       Sincerely,      Susan Rachele Haase, APRN CNP

## 2023-11-29 NOTE — PROGRESS NOTES
Assessment & Plan     Benign essential hypertension:  /77, well controlled, continue chlorthalidone 12.5 mg and amlodipine 10 mg and benazepril 40 mg,  - Carbamazepine total  - Comprehensive metabolic panel (BMP + Alb, Alk Phos, ALT, AST, Total. Bili, TP)  - chlorthalidone (HYGROTON) 25 MG tablet  Dispense: 45 tablet; Refill: 1    Hypercholesterolemia:  continue atorvastatin 20 mg daily  {FUTURE APPOINTMENTS:       - Follow-up visit in 6 months, fasting    Susan Haase, APRN CNP  [facility]    Enrique Judge is a 68 year old, presenting for the following health issues:  Hypertension      11/29/2023     2:55 PM   Additional Questions   Roomed by Vincent Short   Accompanied by self       HPI   Hypertension Follow-up    Do you check your blood pressure regularly outside of the clinic? Yes   Are you following a low salt diet? Yes  Are your blood pressures ever more than 140 on the top number (systolic) OR more   than 90 on the bottom number (diastolic), for example 140/90? No  How many servings of fruits and vegetables do you eat daily?  2-3  On average, how many sweetened beverages do you drink each day (Examples: soda, juice, sweet tea, etc.  Do NOT count diet or artificially sweetened beverages)?   0  How many days per week do you exercise enough to make your heart beat faster? 3 or less  How many minutes a day do you exercise enough to make your heart beat faster? 60 or more  How many days per week do you miss taking your medication? 0  Last visit 10/24 at that time BP was elevated, chlorthalidone 12.5 mg daily was added to amlodipine 10 mg and benazepril 40 mg,.  She has been taking BP at home and it has been 120's/80's.  Denies dizziness, headache, BLE edema, palpitations.        Review of Systems   CONSTITUTIONAL: NEGATIVE for fever, chills, change in weight  ENT/MOUTH: NEGATIVE for ear, mouth and throat problems  RESP: NEGATIVE for significant cough or SOB  CV: NEGATIVE for  "chest pain, palpitations or peripheral edema  PSYCHIATRIC: NEGATIVE for changes in mood or affect      Objective    /77 (BP Location: Right arm, Patient Position: Chair, Cuff Size: Adult Regular)   Pulse 95   Temp 97.9  F (36.6  C) (Oral)   Resp 14   Ht 1.549 m (5' 1\")   Wt 64.9 kg (143 lb)   LMP 10/22/2008 (Exact Date)   SpO2 99%   Breastfeeding No   BMI 27.02 kg/m    Body mass index is 27.02 kg/m .  Physical Exam   GENERAL: healthy, alert and no distress  NECK: no adenopathy, no asymmetry, masses, or scars and thyroid normal to palpation  RESP: lungs clear to auscultation - no rales, rhonchi or wheezes  CV: regular rate and rhythm, normal S1 S2, no S3 or S4, no murmur, click or rub, no peripheral edema   PSYCH: mentation appears normal and affect normal/bright                "

## 2023-12-06 ENCOUNTER — LAB (OUTPATIENT)
Dept: LAB | Facility: CLINIC | Age: 68
End: 2023-12-06
Payer: COMMERCIAL

## 2023-12-06 DIAGNOSIS — E78.00 HYPERCHOLESTEROLEMIA: Primary | ICD-10-CM

## 2023-12-06 LAB
ALBUMIN SERPL BCG-MCNC: 4.2 G/DL (ref 3.5–5.2)
ALP SERPL-CCNC: 119 U/L (ref 40–150)
ALT SERPL W P-5'-P-CCNC: 17 U/L (ref 0–50)
ANION GAP SERPL CALCULATED.3IONS-SCNC: 8 MMOL/L (ref 7–15)
AST SERPL W P-5'-P-CCNC: 21 U/L (ref 0–45)
BILIRUB SERPL-MCNC: 0.2 MG/DL
BUN SERPL-MCNC: 13.5 MG/DL (ref 8–23)
CALCIUM SERPL-MCNC: 9.2 MG/DL (ref 8.8–10.2)
CARBAMAZEPINE SERPL-MCNC: 8.2 UG/ML (ref 4–12)
CHLORIDE SERPL-SCNC: 99 MMOL/L (ref 98–107)
CHOLEST SERPL-MCNC: 211 MG/DL
CREAT SERPL-MCNC: 0.66 MG/DL (ref 0.51–0.95)
DEPRECATED HCO3 PLAS-SCNC: 29 MMOL/L (ref 22–29)
EGFRCR SERPLBLD CKD-EPI 2021: >90 ML/MIN/1.73M2
FASTING STATUS PATIENT QL REPORTED: YES
GLUCOSE SERPL-MCNC: 98 MG/DL (ref 70–99)
HDLC SERPL-MCNC: 74 MG/DL
LDLC SERPL CALC-MCNC: 122 MG/DL
NONHDLC SERPL-MCNC: 137 MG/DL
POTASSIUM SERPL-SCNC: 4 MMOL/L (ref 3.4–5.3)
PROT SERPL-MCNC: 6.9 G/DL (ref 6.4–8.3)
SODIUM SERPL-SCNC: 136 MMOL/L (ref 135–145)
TRIGL SERPL-MCNC: 74 MG/DL

## 2023-12-06 PROCEDURE — 80053 COMPREHEN METABOLIC PANEL: CPT | Performed by: NURSE PRACTITIONER

## 2023-12-06 PROCEDURE — 80061 LIPID PANEL: CPT

## 2023-12-06 PROCEDURE — 36415 COLL VENOUS BLD VENIPUNCTURE: CPT | Performed by: NURSE PRACTITIONER

## 2023-12-06 PROCEDURE — 80156 ASSAY CARBAMAZEPINE TOTAL: CPT | Performed by: NURSE PRACTITIONER

## 2024-02-05 ENCOUNTER — OFFICE VISIT (OUTPATIENT)
Dept: URGENT CARE | Facility: URGENT CARE | Age: 69
End: 2024-02-05
Payer: COMMERCIAL

## 2024-02-05 VITALS
SYSTOLIC BLOOD PRESSURE: 132 MMHG | HEART RATE: 70 BPM | RESPIRATION RATE: 16 BRPM | TEMPERATURE: 97.4 F | DIASTOLIC BLOOD PRESSURE: 78 MMHG | OXYGEN SATURATION: 100 %

## 2024-02-05 DIAGNOSIS — H61.21 IMPACTED CERUMEN OF RIGHT EAR: ICD-10-CM

## 2024-02-05 DIAGNOSIS — H65.91 OME (OTITIS MEDIA WITH EFFUSION), RIGHT: Primary | ICD-10-CM

## 2024-02-05 PROCEDURE — 99213 OFFICE O/P EST LOW 20 MIN: CPT | Mod: 25 | Performed by: NURSE PRACTITIONER

## 2024-02-05 PROCEDURE — 69209 REMOVE IMPACTED EAR WAX UNI: CPT | Mod: 50 | Performed by: NURSE PRACTITIONER

## 2024-02-05 RX ORDER — AZITHROMYCIN 250 MG/1
TABLET, FILM COATED ORAL
Qty: 6 TABLET | Refills: 0 | Status: SHIPPED | OUTPATIENT
Start: 2024-02-05 | End: 2024-02-10

## 2024-02-05 NOTE — PROGRESS NOTES
Assessment & Plan     1. OME (otitis media with effusion), right  Home care reviewed. Patient verbalized understanding; will monitor symptoms closely. Reviewed s/e to medications.   Follow up with primary care in 1 week if symptoms not improving.     Handout given from epic and reviewed.    - azithromycin (ZITHROMAX) 250 MG tablet; Take 2 tablets (500 mg) by mouth daily for 1 day, THEN 1 tablet (250 mg) daily for 4 days.  Dispense: 6 tablet; Refill: 0    2. Impacted cerumen of right ear    - REMOVE IMPACTED CERUMEN    Viv Dodge, FANI [facility] [place]    Enrique Judge is a 68 year old female who presents to clinic today for the following health issues:  Chief Complaint   Patient presents with    Urgent Care     Pt reports ST, R ear discomfort/plugged sensation X 1 week.      HPI    Patient with history of cold symptoms that including sinus congestion, right ear pain states has been worsening. Denies cough or sob    Review of Systems  Constitutional, HEENT, cardiovascular, pulmonary, gi and gu systems are negative, except as otherwise noted.      Objective    /78 (BP Location: Right arm, Patient Position: Sitting, Cuff Size: Adult Regular)   Pulse 70   Temp 97.4  F (36.3  C) (Tympanic)   Resp 16   LMP 10/22/2008 (Exact Date)   SpO2 100%   Physical Exam   GENERAL: alert and no distress  EYES: Eyes grossly normal to inspection, PERRL and conjunctivae and sclerae normal  HENT: normal cephalic/atraumatic, both ears: occluded with wax. Cerumenosis is noted.  Wax is removed by syringing and manual debridement, left ear normal TM, right ear erythema, bulging TM. Nose and mouth without ulcers or lesions, nasal mucosa edematous , rhinorrhea clear, oropharynx clear, and oral mucous membranes moist  NECK: bilateral anterior cervical adenopathy, no asymmetry, masses, or scars, and thyroid normal to palpation  RESP: lungs clear to auscultation - no rales, rhonchi or  wheezes  CV: regular rate and rhythm, normal S1 S2, no S3 or S4, no murmur, click or rub, no peripheral edema  ABDOMEN: soft, nontender, no hepatosplenomegaly, no masses and bowel sounds normal  MS: no gross musculoskeletal defects noted, no edema  SKIN: no suspicious lesions or rashes

## 2024-03-28 DIAGNOSIS — I10 BENIGN ESSENTIAL HYPERTENSION: ICD-10-CM

## 2024-03-28 RX ORDER — CHLORTHALIDONE 25 MG/1
12.5 TABLET ORAL DAILY
Qty: 45 TABLET | Refills: 1 | OUTPATIENT
Start: 2024-03-28

## 2024-04-15 NOTE — TELEPHONE ENCOUNTER
Date: 4/15/2024  PCP: Evan Jean MD    Chief Complaint   Patient presents with    Medicare Wellness Visit     subq    Follow-up     Chronic health conditions        HISTORY OF PRESENT ILLNESS: Bisi Wang is a 84 year old female presenting to clinic for MWV and follow up.    HTN: Maintained on lisinopril 10 mg daily, patient reports compliance with medications. BP today is 100/60. Patient denies any current symptoms of chest pain, shortness of breath, palpitations, headaches, visual disturbances, weakness on any one side of the body, speech abnormalities. Last BUN/Cr was 22/0.69 on 2/21/2024.     Mixed Hyperlipidemia: Maintained on lovastatin 40 mg daily, patient reports compliance with medications. Last lipid panel was on 4/12/2024 cholesterol-221, LDL-132, HDL-62, triglyceride-86.     IFG: Last fasting blood glucose was 108 mg/dL on 1/20/2022.     Psychosis/Alcohol use disorder: Patient managed by Psychiatry.     R knee OA: Patient has seen Ortho in the past, has had steroid injections in the past. Last XR 2016 which showed moderate to severe OA.      No other concerns today.       Complete review of systems completed and is otherwise negative except for those mentioned in the history of presenting complaints.   Past Medical History, Surgical History, Social History and Allergies were all reviewed and updated within EPIC.    MEDS:    Current Outpatient Medications   Medication Sig    QUEtiapine (SEROquel) 25 MG tablet Take 1 tablet by mouth every night.    lovastatin (MEVACOR) 40 MG tablet Take 1 tablet by mouth daily. For cholesterol    lisinopril (ZESTRIL) 20 MG tablet Take 1 tablet by mouth nightly. Indications: High Blood Pressure Disorder    levETIRAcetam (KepPRA) 500 MG tablet Take a HALF tablet by mouth every 12 hours for seizure.    Pyridoxine HCl (vitamin B-6) 25 MG tablet Take 25 mg by mouth daily.    vitamin D3 (CHOLECALCIFEROL) 1.25 mg (50,000 units) capsule     Multiple Vitamin tablet Take  RN called back to pt at this time.    On pt call back:    -Just check if pt heard anything from insurance  -If not, she should check with new insurance. Let pt know on our end, this is being worked on.    1 tablet by mouth daily.    acetaminophen (TYLENOL) 500 MG tablet Take 1 tablet by mouth in the morning and 1 tablet in the evening.    albuterol 108 (90 Base) MCG/ACT inhaler Inhale 2 puffs into the lungs Every 4 hours as needed for Shortness of Breath or Wheezing.    calcium carbonate (TUMS) 500 MG chewable tablet Chew 1-2 tablets by mouth as needed for Heartburn.     No current facility-administered medications for this visit.     PHYSICAL EXAM:  Visit Vitals  /60 (BP Location: LUE - Left upper extremity, Patient Position: Sitting, Cuff Size: Regular)   Pulse 83   Temp 97.3 °F (36.3 °C) (Temporal)   Resp 18   Ht 5' 3.5\" (1.613 m)   Wt 69.4 kg (153 lb)   SpO2 94%   BMI 26.68 kg/m²     General: No acute distress, well hydrated, well groomed.  HEENT: Mucous membranes moist, neck supple, no cervical or supraclavicular lymphadenopathy, TMs clear bilaterally, pharynx without erythema, swelling, exudate, nasal turbinates normal bilaterally, no thyromegaly appreciated   CV: regular rate and rhythm, normal S1/S2, no murmurs/rubs/gallops, non-displaced PMI  Resp: RR- 16, good air entry, CTAB, no wheeze/rales/rhonchi.  Abd: Soft, nontender/non-distended, normoactive bowel sounds, no rebound or guarding, no hepatosplenomegaly.  Ext: No cyanosis or edema, moves all extremities.  Neuro: CN 2-12 grossly intact, no focal deficits   Psych: Mood and affect appropriate.    ASSESSMENT & PLAN:  This patient is a 84 year old female presenting with:      HTN:  - Continue lisinopril 10 mg daily  - CMP prior to next clinic visit.      Hyperlipidemia:  - Continue lovastatin 40 mg daily  - Lipid panel prior to next clinic visit.      IFG:  - Continue dietary an lifestyle modifications      Psychosis/Alcohol use disorder:   - Keep follow up with Psychiatry    R Knee OA;  - Continue Tylenol as needed  - Consider follow up with Ortho    Health Maintenance:  Patient declined     Results will be discussed at next appointment.      FOLLOW-UP:  Patient is to return in 1 year or sooner as needed.     Evan Jean MD  4/15/2024

## 2024-05-01 ENCOUNTER — OFFICE VISIT (OUTPATIENT)
Dept: FAMILY MEDICINE | Facility: CLINIC | Age: 69
End: 2024-05-01
Payer: COMMERCIAL

## 2024-05-01 ENCOUNTER — ANCILLARY PROCEDURE (OUTPATIENT)
Dept: GENERAL RADIOLOGY | Facility: CLINIC | Age: 69
End: 2024-05-01
Attending: FAMILY MEDICINE
Payer: COMMERCIAL

## 2024-05-01 VITALS
OXYGEN SATURATION: 98 % | BODY MASS INDEX: 27 KG/M2 | TEMPERATURE: 97.9 F | HEART RATE: 75 BPM | RESPIRATION RATE: 20 BRPM | HEIGHT: 61 IN | WEIGHT: 143 LBS | DIASTOLIC BLOOD PRESSURE: 81 MMHG | SYSTOLIC BLOOD PRESSURE: 133 MMHG

## 2024-05-01 DIAGNOSIS — M79.671 RIGHT FOOT PAIN: Primary | ICD-10-CM

## 2024-05-01 DIAGNOSIS — Z29.11 NEED FOR VACCINATION AGAINST RESPIRATORY SYNCYTIAL VIRUS: ICD-10-CM

## 2024-05-01 DIAGNOSIS — N39.46 MIXED STRESS AND URGE URINARY INCONTINENCE: ICD-10-CM

## 2024-05-01 DIAGNOSIS — M79.671 RIGHT FOOT PAIN: ICD-10-CM

## 2024-05-01 DIAGNOSIS — G50.0 TRIGEMINAL NEURALGIA: ICD-10-CM

## 2024-05-01 PROCEDURE — 99213 OFFICE O/P EST LOW 20 MIN: CPT | Performed by: FAMILY MEDICINE

## 2024-05-01 PROCEDURE — 73630 X-RAY EXAM OF FOOT: CPT | Mod: TC | Performed by: RADIOLOGY

## 2024-05-01 RX ORDER — RESPIRATORY SYNCYTIAL VIRUS VACCINE 120MCG/0.5
0.5 KIT INTRAMUSCULAR ONCE
Qty: 1 EACH | Refills: 0 | Status: CANCELLED | OUTPATIENT
Start: 2024-05-01 | End: 2024-05-01

## 2024-05-01 RX ORDER — GABAPENTIN 300 MG/1
CAPSULE ORAL
COMMUNITY
Start: 2024-05-01 | End: 2024-06-24

## 2024-05-01 NOTE — PROGRESS NOTES
"  Assessment & Plan   See patient instructions.    Need for vaccination against respiratory syncytial virus  Patient referred to pharmacy for lower cost vaccination.    Mixed stress and urge urinary incontinence    - Ob/Gyn  Referral    Trigeminal neuralgia    - gabapentin (NEURONTIN) 300 MG capsule    Right foot pain    - XR Foot Right G/E 3 Views  - Orthopedic  Referral              BMI  Estimated body mass index is 27.02 kg/m  as calculated from the following:    Height as of this encounter: 1.549 m (5' 1\").    Weight as of this encounter: 64.9 kg (143 lb).             Enrique Judge is a 68 year old, presenting for the following health issues:  RECHECK (Cyst base of neck, bone spur right soul of foot pain when walk of hard floor, needing to urinate often due to bladder surgery)        5/1/2024     1:05 PM   Additional Questions   Roomed by Mandy   Accompanied by self     History of Present Illness       Reason for visit:  Follow up                  Review of Systems  Constitutional, neuro, ENT, endocrine, pulmonary, cardiac, gastrointestinal, genitourinary, musculoskeletal, integument and psychiatric systems are negative, except as otherwise noted.    Has right foot pain in center of forefoot on plantar side, painful with walking barefoot on hard surface. Has been an issue for past 8 months.     Had a hysterectomy in the past 18 months, and has had bladder leakage since then. Surgery was done in [place]. She does Kegel exercises with little success. Had prior bladder sling surgery. She feels like the condition may have improved.     Patient is currently taking gabapentin prescribed through provider prescribed in [place] for trigeminal neuralgia. She feels like condition is improved. She has been seeing Dr Velasquez in the [facility].  She was advised that prescription of gabapentin should be done through primary care provider.        Objective    /81 (BP Location: Right arm, Patient " "Position: Sitting, Cuff Size: Adult Regular)   Pulse 75   Temp 97.9  F (36.6  C) (Oral)   Resp 20   Ht 1.549 m (5' 1\")   Wt 64.9 kg (143 lb)   LMP 10/22/2008 (Exact Date)   SpO2 98%   BMI 27.02 kg/m    Body mass index is 27.02 kg/m .  Physical Exam   Vital signs reviewed.  Patient is in no acute appearing distress except for being briefly tearful when talking about her previous discomfort from trigeminal neuralgia.  Breathing appears nonlabored.  Patient is alert and oriented ×3.  Patient is pleasant, making good eye contact and responding with clear fluent speech.    Neurological exam: Cranial nerves II through XII are intact.    Right foot exam: Patient is tender over the distal 2nd metatarsal, with no skin abnormality noted.  I do not palpate any deformity.            Signed Electronically by: Andrés Pisano DO    "

## 2024-05-02 ENCOUNTER — OFFICE VISIT (OUTPATIENT)
Dept: PODIATRY | Facility: CLINIC | Age: 69
End: 2024-05-02
Attending: FAMILY MEDICINE
Payer: COMMERCIAL

## 2024-05-02 ENCOUNTER — ANCILLARY PROCEDURE (OUTPATIENT)
Dept: GENERAL RADIOLOGY | Facility: CLINIC | Age: 69
End: 2024-05-02
Attending: PODIATRIST
Payer: COMMERCIAL

## 2024-05-02 VITALS — BODY MASS INDEX: 27.02 KG/M2 | WEIGHT: 143 LBS | SYSTOLIC BLOOD PRESSURE: 120 MMHG | DIASTOLIC BLOOD PRESSURE: 65 MMHG

## 2024-05-02 DIAGNOSIS — M79.671 RIGHT FOOT PAIN: ICD-10-CM

## 2024-05-02 DIAGNOSIS — M77.41 METATARSALGIA OF RIGHT FOOT: Primary | ICD-10-CM

## 2024-05-02 PROCEDURE — 99203 OFFICE O/P NEW LOW 30 MIN: CPT | Performed by: PODIATRIST

## 2024-05-02 PROCEDURE — 73620 X-RAY EXAM OF FOOT: CPT | Mod: TC | Performed by: RADIOLOGY

## 2024-05-02 NOTE — PATIENT INSTRUCTIONS
Thank you for choosing [facility] Podiatry / Foot & Ankle Surgery!    DR. ARSHAD'S CLINIC LOCATIONS:     [facility] TRIAGE LINE: [phone]   [address] [place] [address] APPOINTMENTS: [phone]   [place] [address] RADIOLOGY: [phone]   (Every other Tues - Wed - Fri PM) SET UP SURGERY: [phone]    PHYSICAL THERAPY: [phone]   [place] SPECIALTY BILLING QUESTIONS: [phone] [address] FAX: [phone]   [place] [address]    (Thurs & Fri AM)         CAPSULITIS / METATARSALGIA  All joints in the body are surrounded by a capsule, or a covering of soft tissue and ligaments. The capsule holds bones together and secretes joint fluid to help lubricate the joint. If a joint capsule is exposed to excessive force, it can develop microscopic tears and become inflamed. This commonly occurs in the foot due to mild variation in anatomy. Hammertoes, bunions, irregular bone length, joint immobility, etc. can all lead to excessive force on the joint. Capsule injury can also occur due to repetitive stress from exercise, insufficient support from shoes, excessive bare foot walking and excessive weight.      Conservative treatments include ice, rest from the aggravating activity, weight loss, orthotic inserts, improving shoes and shoe modifications. You can purchase an over the counter felt metatarsal pad to place in your shoes at [facility] or on [facility]. Appropriate shoes will protect the inflamed tissue improving the chances of healing. Avoidance of standing or walking barefoot, including around the house, is necessary to allow healing. Casts are sometimes used for more aggressive protection.  NSAIDs such as Advil are also used to help with pain and decreasing inflammation. If pain continues over a period of weeks with continuous rest and icing, Corticosteroid injections can be a treatment option to try and help decrease inflammation.    Surgery is often necessary to correct the underlying  "structural problem. Surgery might include shortening an excessively long bone, repairing bunion or hammertoe, lengthening a tight Achilles  tendon, etc. These are same day surgeries that might be pursued if more conservative measures fail to provide relief.      The inflamed joint capsule has the potential to completely tear. This will allow the toe to drift off the ground, curving toward the other toes. The involved toe may under or overlap the adjacent toes as drift continues. The pain may improve after the joint tears or this new position will be permanent. Surgery can address the toe alignment. Your goal of treating capsulitis is to avoid this scenario.        ** You can find felt metatarsal pads to place in your shoes at our [facility], [employer], [email or web address], or on [employer]     Dr. Yang likes the [employer] brand.    FOREFOOT PAIN RECOMMENDATIONS    1) Please consider stiffer/ rigid - soled shoes as much as possible. These take stress off of the ball of your foot. This might be short term, until pain resolves, or long term to keep pain controlled.    2) Avoid barefoot walking, walking in socks, flexible shoes, flip flops, shoes without arch support, etc.    3) It is a good idea to wear a shoe at all times, including when at home.    4) Consider purchasing a felt metatarsal pad.  A good brand is \"[employer]ad.\"  You can find these and others online.  Also, these can be built into custom/prescription arch supports.    5) Consider a quality over-the-counter arch support. These can be found at [email or web address].  Some of these have a metatarsal pad built in.   If Dr. Yang prescribed custom orthoses, please consider this option.    6) Ice and anti inflammatories can be helpful, earlier on in the process.  Anti inflammatories are not good for you, if taken for a long period of time.     7) Gentle calf stretching can take pressure off of the ball of your foot.    Calf/Achilles Stretching: Do the stretching " gently. Do not bounce or stretch to the point of pain. Hold each stretch for 30 seconds. Stretch 10 times per set, three sets per day. Morning, afternoon and evening. If your heel pain is very severe in the morning, consider doing the first set of stretches before you get out of bed.               Hold each stretch for 30 seconds. Stretch 10 times per set, three sets per day. Morning, afternoon and evening. If your heel pain is very severe in the morning, consider doing the first set of stretches before you get out of bed.

## 2024-05-02 NOTE — LETTER
5/2/2024         RE: Nu Taylor  [address] [facility] [address]        Dear Colleague,    Thank you for referring your patient, Nu Taylor, to the [facility]. Please see a copy of my visit note below.    ASSESSMENT:  Encounter Diagnoses   Name Primary?     Metatarsalgia of right foot Yes     Right foot pain      MEDICAL DECISION MAKING:  I personally reviewed the 1 view, right foot sesamoidal axial.  The third metatarsal head might lie slightly lower in the sagittal plane the other metatarsal heads.  There is specifically no bone spur, osseous growth or other pathology seen.    I suspect that she is feeling high pressure under the third metatarsal head.  This is likely related to its position in the sagittal plane and some thinning of the plantar fat pad.    She is very concerned about a possible soft tissue mass or growth, and is noted increase in size and discomfort.  She describes discomfort, yes that is not painful.    Therefore, I offered an MRI of the right foot.  We will evaluate for any form space-occupying lesion below the third metatarsal head.    She was provided information on metatarsalgia which includes multiple recommendations for offloading the plantar forefoot.  These include stiff soled shoes, metatarsal padding, avoidance of barefoot walking, and gentle calf stretching exercises.    Disclaimer: This note consists of symbols derived from keyboarding, dictation and/or voice recognition software. As a result, there may be errors in the script that have gone undetected. Please consider this when interpreting information found in this chart.    Casper Yang, SABRA, FACFAS, [facility] Department of Podiatry/Foot & Ankle Surgery      ____________________________________________________________________    HPI:       I was asked by Andrés Pisano DO, to evaluate this patient for right foot pain.  Nu reports a growth or spur on the bottom of the  "right foot  8 months duration  She specifies the region below the third metatarsal head.  \"Uncomfortable when not walking on carpet \"  Otherwise, really no norberto pain.  She is avoiding walking barefoot on hard floors is wearing comfortable shoes.  Exercise includes walking 3 miles daily.  *  Past Medical History:   Diagnosis Date     Allergic rhinitis      HTN (hypertension)      Hyperlipidemia 08/06/2008     Trigeminal neuralgia    *  *  Past Surgical History:   Procedure Laterality Date     C section with Tubal ligation       LAPAROSCOPIC SUSPENSION BLADDER NECK     *  *  Current Outpatient Medications   Medication Sig Dispense Refill     amLODIPine-benazepril (LOTREL) 10-40 MG capsule Take 1 capsule by mouth once daily 90 capsule 3     atorvastatin (LIPITOR) 20 MG tablet Take 1 tablet (20 mg) by mouth daily 90 tablet 3     carBAMazepine (TEGRETOL) 200 MG tablet TAKE 3 TABLETS(600 MG) BY MOUTH TWICE DAILY 540 tablet 1     chlorthalidone (HYGROTON) 25 MG tablet Take 0.5 tablets (12.5 mg) by mouth daily 45 tablet 1     gabapentin (NEURONTIN) 300 MG capsule TAKE 1 CAPSULE(300 MG) BY MOUTH TWO TIMES DAILY       Multiple Vitamins-Minerals (CENTRUM PO)            EXAM:    Vitals: /65   Wt 64.9 kg (143 lb)   LMP 10/22/2008 (Exact Date)   BMI 27.02 kg/m    BMI: Body mass index is 27.02 kg/m .    Constitutional:  Nu Taylor is in no apparent distress, appears well-nourished.  Cooperative with history and physical exam.    Vascular:  Pedal pulses are palpable for both the DP and PT arteries.  CFT < 3 sec.  No edema.      Neuro: Light touch sensation is intact to the L4, L5, S1 distributions  No evidence of weakness, spasticity, or contracture in the lower extremities.     Derm: Normal texture and turgor.  No erythema, ecchymosis, or cyanosis.  No open lesions.     Musculoskeletal:    Lower extremity muscle strength is normal. No gross deformities.  On palpatory exam, the plantar aspect of the third metatarsal " head is more palpable than neighboring metatarsals.  I do not appreciate an obvious soft tissue mass.    XR FOOT RIGHT G/E 3 VIEWS 5/1/2024 2:12 PM      HISTORY: Right foot pain     COMPARISON: None.                                                                          IMPRESSION: Advanced degenerative change at the first MTP joint.  Degenerative changes in the IP joint of the great toe. No evidence for  acute fracture. Mild hammertoe deformities.     OSORIO PATTERSON MD       Again, thank you for allowing me to participate in the care of your patient.        Sincerely,        Casper Yang DPM

## 2024-05-02 NOTE — PROGRESS NOTES
"ASSESSMENT:  Encounter Diagnoses   Name Primary?    Metatarsalgia of right foot Yes    Right foot pain      MEDICAL DECISION MAKING:  I personally reviewed the 1 view, right foot sesamoidal axial.  The third metatarsal head might lie slightly lower in the sagittal plane the other metatarsal heads.  There is specifically no bone spur, osseous growth or other pathology seen.    I suspect that she is feeling high pressure under the third metatarsal head.  This is likely related to its position in the sagittal plane and some thinning of the plantar fat pad.    She is very concerned about a possible soft tissue mass or growth, and is noted increase in size and discomfort.  She describes discomfort, yes that is not painful.    Therefore, I offered an MRI of the right foot.  We will evaluate for any form space-occupying lesion below the third metatarsal head.    She was provided information on metatarsalgia which includes multiple recommendations for offloading the plantar forefoot.  These include stiff soled shoes, metatarsal padding, avoidance of barefoot walking, and gentle calf stretching exercises.    Disclaimer: This note consists of symbols derived from keyboarding, dictation and/or voice recognition software. As a result, there may be errors in the script that have gone undetected. Please consider this when interpreting information found in this chart.    Casper Yang DPM, FACFAS, MS    [place] Department of Podiatry/Foot & Ankle Surgery      ____________________________________________________________________    HPI:       I was asked by Andrés Pisano DO, to evaluate this patient for right foot pain.  Nu reports a growth or spur on the bottom of the right foot  8 months duration  She specifies the region below the third metatarsal head.  \"Uncomfortable when not walking on carpet \"  Otherwise, really no norberto pain.  She is avoiding walking barefoot on hard floors is wearing comfortable shoes.  Exercise " includes walking 3 miles daily.  *  Past Medical History:   Diagnosis Date    Allergic rhinitis     HTN (hypertension)     Hyperlipidemia 08/06/2008    Trigeminal neuralgia    *  *  Past Surgical History:   Procedure Laterality Date    C section with Tubal ligation      LAPAROSCOPIC SUSPENSION BLADDER NECK     *  *  Current Outpatient Medications   Medication Sig Dispense Refill    amLODIPine-benazepril (LOTREL) 10-40 MG capsule Take 1 capsule by mouth once daily 90 capsule 3    atorvastatin (LIPITOR) 20 MG tablet Take 1 tablet (20 mg) by mouth daily 90 tablet 3    carBAMazepine (TEGRETOL) 200 MG tablet TAKE 3 TABLETS(600 MG) BY MOUTH TWICE DAILY 540 tablet 1    chlorthalidone (HYGROTON) 25 MG tablet Take 0.5 tablets (12.5 mg) by mouth daily 45 tablet 1    gabapentin (NEURONTIN) 300 MG capsule TAKE 1 CAPSULE(300 MG) BY MOUTH TWO TIMES DAILY      Multiple Vitamins-Minerals (CENTRUM PO)            EXAM:    Vitals: /65   Wt 64.9 kg (143 lb)   LMP 10/22/2008 (Exact Date)   BMI 27.02 kg/m    BMI: Body mass index is 27.02 kg/m .    Constitutional:  Nu Taylor is in no apparent distress, appears well-nourished.  Cooperative with history and physical exam.    Vascular:  Pedal pulses are palpable for both the DP and PT arteries.  CFT < 3 sec.  No edema.      Neuro: Light touch sensation is intact to the L4, L5, S1 distributions  No evidence of weakness, spasticity, or contracture in the lower extremities.     Derm: Normal texture and turgor.  No erythema, ecchymosis, or cyanosis.  No open lesions.     Musculoskeletal:    Lower extremity muscle strength is normal. No gross deformities.  On palpatory exam, the plantar aspect of the third metatarsal head is more palpable than neighboring metatarsals.  I do not appreciate an obvious soft tissue mass.    XR FOOT RIGHT G/E 3 VIEWS 5/1/2024 2:12 PM      HISTORY: Right foot pain     COMPARISON: None.                                                                           IMPRESSION: Advanced degenerative change at the first MTP joint.  Degenerative changes in the IP joint of the great toe. No evidence for  acute fracture. Mild hammertoe deformities.     OSORIO PATTERSON MD

## 2024-05-09 ENCOUNTER — OFFICE VISIT (OUTPATIENT)
Dept: URGENT CARE | Facility: URGENT CARE | Age: 69
End: 2024-05-09
Payer: COMMERCIAL

## 2024-05-09 VITALS
SYSTOLIC BLOOD PRESSURE: 133 MMHG | HEART RATE: 74 BPM | DIASTOLIC BLOOD PRESSURE: 82 MMHG | TEMPERATURE: 98.1 F | OXYGEN SATURATION: 98 %

## 2024-05-09 DIAGNOSIS — R30.0 DYSURIA: ICD-10-CM

## 2024-05-09 DIAGNOSIS — N30.01 ACUTE CYSTITIS WITH HEMATURIA: Primary | ICD-10-CM

## 2024-05-09 LAB
ALBUMIN UR-MCNC: NEGATIVE MG/DL
APPEARANCE UR: CLEAR
BACTERIA #/AREA URNS HPF: ABNORMAL /HPF
BILIRUB UR QL STRIP: NEGATIVE
COLOR UR AUTO: YELLOW
GLUCOSE UR STRIP-MCNC: NEGATIVE MG/DL
HGB UR QL STRIP: ABNORMAL
KETONES UR STRIP-MCNC: NEGATIVE MG/DL
LEUKOCYTE ESTERASE UR QL STRIP: ABNORMAL
NITRATE UR QL: NEGATIVE
PH UR STRIP: 6.5 [PH] (ref 5–7)
RBC #/AREA URNS AUTO: ABNORMAL /HPF
SP GR UR STRIP: 1.01 (ref 1–1.03)
SQUAMOUS #/AREA URNS AUTO: ABNORMAL /LPF
UROBILINOGEN UR STRIP-ACNC: 0.2 E.U./DL
WBC #/AREA URNS AUTO: ABNORMAL /HPF

## 2024-05-09 PROCEDURE — 87086 URINE CULTURE/COLONY COUNT: CPT | Performed by: PHYSICIAN ASSISTANT

## 2024-05-09 PROCEDURE — 99213 OFFICE O/P EST LOW 20 MIN: CPT | Performed by: PHYSICIAN ASSISTANT

## 2024-05-09 PROCEDURE — 87186 SC STD MICRODIL/AGAR DIL: CPT | Performed by: PHYSICIAN ASSISTANT

## 2024-05-09 PROCEDURE — 81001 URINALYSIS AUTO W/SCOPE: CPT

## 2024-05-09 RX ORDER — NITROFURANTOIN 25; 75 MG/1; MG/1
100 CAPSULE ORAL 2 TIMES DAILY
Qty: 10 CAPSULE | Refills: 0 | Status: SHIPPED | OUTPATIENT
Start: 2024-05-09 | End: 2024-05-14

## 2024-05-09 ASSESSMENT — ENCOUNTER SYMPTOMS
DYSURIA: 1
FREQUENCY: 1
ABDOMINAL PAIN: 1
FEVER: 0
CONSTIPATION: 1
VOMITING: 0

## 2024-05-09 NOTE — PROGRESS NOTES
"  Assessment & Plan:        ICD-10-CM    1. Acute cystitis with hematuria  N30.01 nitroFURantoin macrocrystal-monohydrate (MACROBID) 100 MG capsule      2. Dysuria  R30.0 UA Macroscopic with reflex to Microscopic and Culture - Clinic Collect     Urine Microscopic Exam     Urine Culture            Plan/Clinical Decision Making:    Patient with acute urinary symptoms and suprapubic pain.   UA consistent with infection. Will treat with Macrobid. Normal past GFR.   Has upcoming visit with Uro/GYN on June 17th due to chronic issues.   UC pending. Call in 2 days if not better.     Patient Instructions   Repeat urine at next visit since small amount of blood.       Return if symptoms worsen or fail to improve, for in 2-3 days.     At the end of the encounter, I discussed results, diagnosis, medications. Discussed red flags for immediate return to clinic/ER, as well as indications for follow up if no improvement. Patient understood and agreed to plan. Patient was stable for discharge.        Loida Morales PA-C on 5/9/2024 at 2:04 PM          Subjective:     HPI:    Nu is a 68 year old female who presents to clinic today for the following health issues:  Chief Complaint   Patient presents with    Urgent Care     Pt states that she is experiencing pelvic pain that moves up and down the abdomen that started today. She feels bloated, and has polyuria. She states that she feels a \"contraction\" sensation happening. Lethargic     HPI    Patient with mid-lower abdominal pain. Started today. Has had urinary frequency. Some intermittent burning.   Had hysterectomy last year and still having urinary issues. Has had bladder sling in past.   Hasn't had a bladder infection for a long time.   GRF: normal CMP.       Review of Systems   Constitutional:  Negative for fever.   Gastrointestinal:  Positive for abdominal pain (lower abdominal pain central) and constipation (chronic). Negative for vomiting.   Genitourinary:  Positive for " dysuria, frequency and urgency. Negative for vaginal discharge and vaginal pain.         Patient Active Problem List   Diagnosis    Actinic keratosis    Hallux rigidus    Hypercholesterolemia    Advanced directives, counseling/discussion    Mitral valve stenosis, mild    Urinary frequency    DDD (degenerative disc disease), cervical    Seasonal allergic rhinitis    Trigeminal neuralgia    Complete tear of right rotator cuff    Benign essential hypertension    Cystocele, midline    Mixed stress and urge urinary incontinence    Intrinsic sphincter deficiency        Past Medical History:   Diagnosis Date    Allergic rhinitis     HTN (hypertension)     Hyperlipidemia 08/06/2008    Trigeminal neuralgia        Social History     Tobacco Use    Smoking status: Never    Smokeless tobacco: Never   Substance Use Topics    Alcohol use: Yes     Alcohol/week: 0.0 standard drinks of alcohol     Comment: occ             Objective:     Vitals:    05/09/24 1333   BP: 133/82   BP Location: Right arm   Patient Position: Sitting   Cuff Size: Adult Regular   Pulse: 74   Temp: 98.1  F (36.7  C)   TempSrc: Oral   SpO2: 98%         Physical Exam   EXAM:   Pleasant, alert, appropriate appearance. NAD.  Head Exam: Normocephalic, atraumatic.  Eye Exam: non icteric/injection.    ABD: soft, suprapubic tenderness.  no rebound/guarding.  No masses/organomegaly.  Ext/musculoskeletal:  No edema  No CVA tenderness.       Results:  Results for orders placed or performed in visit on 05/09/24   UA Macroscopic with reflex to Microscopic and Culture - Clinic Collect     Status: Abnormal    Specimen: Urine, Clean Catch   Result Value Ref Range    Color Urine Yellow Colorless, Straw, Light Yellow, Yellow    Appearance Urine Clear Clear    Glucose Urine Negative Negative mg/dL    Bilirubin Urine Negative Negative    Ketones Urine Negative Negative mg/dL    Specific Gravity Urine 1.010 1.003 - 1.035    Blood Urine Small (A) Negative    pH Urine 6.5 5.0 - 7.0     Protein Albumin Urine Negative Negative mg/dL    Urobilinogen Urine 0.2 0.2, 1.0 E.U./dL    Nitrite Urine Negative Negative    Leukocyte Esterase Urine Large (A) Negative   Urine Microscopic Exam     Status: Abnormal   Result Value Ref Range    Bacteria Urine Few (A) None Seen /HPF    RBC Urine 2-5 (A) 0-2 /HPF /HPF    WBC Urine  (A) 0-5 /HPF /HPF    Squamous Epithelials Urine Few (A) None Seen /LPF

## 2024-05-10 LAB — BACTERIA UR CULT: ABNORMAL

## 2024-05-23 ENCOUNTER — TELEPHONE (OUTPATIENT)
Dept: PODIATRY | Facility: CLINIC | Age: 69
End: 2024-05-23

## 2024-05-23 ENCOUNTER — HOSPITAL ENCOUNTER (OUTPATIENT)
Dept: MRI IMAGING | Facility: CLINIC | Age: 69
Discharge: HOME | End: 2024-05-23
Attending: PODIATRIST | Admitting: PODIATRIST
Payer: COMMERCIAL

## 2024-05-23 DIAGNOSIS — R22.41 MASS OF RIGHT FOOT: ICD-10-CM

## 2024-05-23 DIAGNOSIS — M79.671 RIGHT FOOT PAIN: ICD-10-CM

## 2024-05-23 DIAGNOSIS — M79.671 RIGHT FOOT PAIN: Primary | ICD-10-CM

## 2024-05-23 LAB — RAD FLAG-ADDENDUM: NORMAL

## 2024-05-23 PROCEDURE — 73720 MRI LWR EXTREMITY W/O&W/DYE: CPT | Mod: 26 | Performed by: RADIOLOGY

## 2024-05-23 PROCEDURE — 255N000002 HC RX 255 OP 636: Performed by: PODIATRIST

## 2024-05-23 PROCEDURE — 73720 MRI LWR EXTREMITY W/O&W/DYE: CPT | Mod: RT

## 2024-05-23 PROCEDURE — A9585 GADOBUTROL INJECTION: HCPCS | Performed by: PODIATRIST

## 2024-05-23 RX ORDER — GADOBUTROL 604.72 MG/ML
6.5 INJECTION INTRAVENOUS ONCE
Status: COMPLETED | OUTPATIENT
Start: 2024-05-23 | End: 2024-05-23

## 2024-05-23 RX ADMIN — GADOBUTROL 6.5 ML: 604.72 INJECTION INTRAVENOUS at 07:29

## 2024-05-23 NOTE — TELEPHONE ENCOUNTER
I called and spoke with Nu regarding the MRI results.  She is not aware of the results, as she does not have MyChart.  I shared the finding of a circumscribed lesion in the area of concern.  The radiology report states that is most compatible with a benign subcutaneous lipoma.  However, due to reported increase in size and pain, it is difficult to exclude a low-grade liposarcoma.  Certainly this is much more concerning.    Nu is referred to orthopedic oncology at [facility] for their expert opinion on the reasonable next steps.    She expressed interest in further workup.  She did state that she has minimal pain when she is wearing shoes.    Nonetheless, I think further workup and potential biopsy is a good idea.    Casper Yang DPM, FACFAS, MS  [facility] Department of Podiatry/Foot & Ankle Surgery

## 2024-05-31 ENCOUNTER — TELEPHONE (OUTPATIENT)
Dept: ORTHOPEDICS | Facility: CLINIC | Age: 69
End: 2024-05-31
Payer: COMMERCIAL

## 2024-05-31 NOTE — TELEPHONE ENCOUNTER
Patient has a Referral from Dr. Casper Yang to orthopedic oncology, [facility]. Subcutaneous mass, right foot consistent with a lipoma yet low-grade liposarcoma not excluded per MRI     Please call patient to schedule appt.

## 2024-06-03 ENCOUNTER — TELEPHONE (OUTPATIENT)
Dept: FAMILY MEDICINE | Facility: CLINIC | Age: 69
End: 2024-06-03
Payer: COMMERCIAL

## 2024-06-03 NOTE — TELEPHONE ENCOUNTER
Called and spoke with patient. She was taking her son to urgent care and requested my number to give me a call back at her convenience.     Fiorella Gee LPN

## 2024-06-03 NOTE — TELEPHONE ENCOUNTER
Writer called patient, inquired about call from earlier. Patient is needing to call back orthopedics. Writer gave patient orthopedics number to patient.

## 2024-06-03 NOTE — TELEPHONE ENCOUNTER
Test Results    Contacts         Type Contact Phone/Fax    06/03/2024 12:01 PM CDT Phone (Incoming) Nu Taylor (Self) [phone] (M)     Ok to leave a detailed voicemail            Who ordered the test:      Type of test: Lab and MRI    Date of test:  5/23/24    Where was the test performed:  [place]     What are your questions/concerns?:  Results     Okay to leave a detailed message?: Yes at Cell number on file:    Telephone Information:   Mobile [phone]

## 2024-06-04 NOTE — TELEPHONE ENCOUNTER
VM left requesting a return call to schedule an appointment with an orthopedic oncologist.    Fiorella Gee LPN

## 2024-06-04 NOTE — TELEPHONE ENCOUNTER
Hello,  We still have this active in our workqueue.  Have all communications been completed to help this pt schedule an oncology appt?  Thank you.

## 2024-06-05 ENCOUNTER — OFFICE VISIT (OUTPATIENT)
Dept: OBGYN | Facility: CLINIC | Age: 69
End: 2024-06-05
Payer: COMMERCIAL

## 2024-06-05 VITALS — HEIGHT: 61 IN | BODY MASS INDEX: 27.02 KG/M2

## 2024-06-05 DIAGNOSIS — N39.41 URGE INCONTINENCE OF URINE: Primary | ICD-10-CM

## 2024-06-05 DIAGNOSIS — N39.46 MIXED STRESS AND URGE URINARY INCONTINENCE: ICD-10-CM

## 2024-06-05 LAB
ALBUMIN UR-MCNC: NEGATIVE MG/DL
APPEARANCE UR: CLEAR
BILIRUB UR QL STRIP: NEGATIVE
COLOR UR AUTO: ABNORMAL
GLUCOSE UR STRIP-MCNC: NEGATIVE MG/DL
HGB UR QL STRIP: NEGATIVE
KETONES UR STRIP-MCNC: NEGATIVE MG/DL
LEUKOCYTE ESTERASE UR QL STRIP: NEGATIVE
NITRATE UR QL: NEGATIVE
PH UR STRIP: 8 [PH] (ref 5–7)
SP GR UR STRIP: 1.01 (ref 1–1.03)
UROBILINOGEN UR STRIP-ACNC: 0.2 E.U./DL

## 2024-06-05 PROCEDURE — G2211 COMPLEX E/M VISIT ADD ON: HCPCS | Performed by: FAMILY MEDICINE

## 2024-06-05 PROCEDURE — 81003 URINALYSIS AUTO W/O SCOPE: CPT | Performed by: FAMILY MEDICINE

## 2024-06-05 PROCEDURE — 99214 OFFICE O/P EST MOD 30 MIN: CPT | Performed by: FAMILY MEDICINE

## 2024-06-05 RX ORDER — ESTRADIOL 0.1 MG/G
2 CREAM VAGINAL
Qty: 70 G | Refills: 3 | Status: SHIPPED | OUTPATIENT
Start: 2024-06-06

## 2024-06-05 NOTE — PROGRESS NOTES
SUBJECTIVE:  Nu Taylor is an 68 year old   woman who presents for   gynecology consult for urinary frequency for years, s/p bladder sling   [facility] and bladder sling  in [place].      Still having urinary incontinence   Urinary frequency occurring for years   Frequent vaginal yeast infections from this    Patient's last menstrual period was 10/22/2008 (exact date).       Current contraception: hysterectomy  History of abnormal Pap smear: No  Family history of uterine or ovarian cancer: No  History of abnormal mammogram: No  Family history of breast cancer: No        Past Medical History:   Diagnosis Date    Allergic rhinitis     HTN (hypertension)     Hyperlipidemia 2008    Trigeminal neuralgia           Family History   Problem Relation Age of Onset    No Known Problems Mother     No Known Problems Father     Leukemia Maternal Grandmother     Diabetes Son         type 1    Family History Negative No family hx of     C.A.D. No family hx of     Cancer - colorectal No family hx of        Past Surgical History:   Procedure Laterality Date    C section with Tubal ligation      HYSTERECTOMY VAGINAL  2023    with bladder repair    LAPAROSCOPIC SUSPENSION BLADDER NECK      [facility]       Current Outpatient Medications   Medication Sig Dispense Refill    amLODIPine-benazepril (LOTREL) 10-40 MG capsule Take 1 capsule by mouth once daily 90 capsule 3    atorvastatin (LIPITOR) 20 MG tablet Take 1 tablet (20 mg) by mouth daily 90 tablet 3    carBAMazepine (TEGRETOL) 200 MG tablet TAKE 3 TABLETS(600 MG) BY MOUTH TWICE DAILY 540 tablet 1    chlorthalidone (HYGROTON) 25 MG tablet Take 0.5 tablets (12.5 mg) by mouth daily 45 tablet 1    gabapentin (NEURONTIN) 300 MG capsule TAKE 1 CAPSULE(300 MG) BY MOUTH TWO TIMES DAILY      Multiple Vitamins-Minerals (CENTRUM PO)        No current facility-administered medications for this visit.     Allergies   Allergen Reactions    Norco [Hydrocodone-Acetaminophen]  "Nausea and Vomiting    Penicillins Nausea and Vomiting       Social History     Tobacco Use    Smoking status: Never    Smokeless tobacco: Never   Substance Use Topics    Alcohol use: Yes     Alcohol/week: 0.0 standard drinks of alcohol     Comment: occ       Review Of Systems  Ears/Nose/Throat: negative  Respiratory: No shortness of breath, dyspnea on exertion, cough, or hemoptysis  Cardiovascular: negative  Gastrointestinal: negative  Genitourinary: See HPI   Constitutional, HEENT, cardiovascular, pulmonary, GI, , musculoskeletal, neuro, skin, endocrine and psych systems are negative, except as otherwise noted.    OBJECTIVE:  Ht 1.549 m (5' 1\")   LMP 10/22/2008 (Exact Date)   BMI 27.02 kg/m    General appearance: healthy, alert, and no distress  Skin: Skin color, texture, turgor normal. No rashes or lesions.  Ears: negative  Nose/Sinuses: Nares normal. Septum midline. Mucosa normal. No drainage or sinus tenderness.  Oropharynx: Lips, mucosa, and tongue normal. Teeth and gums normal.  Neck: Neck supple. No adenopathy. Thyroid symmetric, normal size,, Carotids without bruits.  Lungs: negative, Percussion normal. Good diaphragmatic excursion. Lungs clear  Heart: negative, PMI normal. No lifts, heaves, or thrills. RRR. No murmurs, clicks gallops or rub  Pelvic: Pelvic:  Pelvic examination with no pap/no Gonorrhea and Chlamydia   including  External genitalia normal   and vagina normal rugatted not atrophic  Examination of urethra  normal no masses, tenderness, scarring  bladder, no masses or tenderness  Vaginal cuff with atrophy  Bimanual exam with uterus/ovaries/cervix surgically absent      ASSESSMENT:  Nu Taylor is an 68 year old   woman who presents for   gynecology consult for urinary frequency for years, s/p bladder sling   [facility] and bladder sling  in [place].      PLAN:  Dx:  1)  Urinary frequency:  since before , would like to decrease urinary frequency   She has tried mybertriq "   Vaginal hysterectomy with bilateral salpingo-oophorectomy  Anterior and posterior colporrhaphy  Uterosacral cuff suspension  Cystoscopy   Previously Dr. Mccarthy noted mixed urinary incontinence with a component of urgency frequency and urge incontinence as well as urinary stress incontinence and intrinsic sphincter deficiency.   Has tried mybertriq and oxybutynin  Consider amitryptiline  2)  Urinary incontinence, mostly urge, previously stress as well, s/p sling procedure x 2 (2010, and 2023 repeated) and hysterectomy 2023  Recommend physical therapy and urology appointment   3. Chronic constipation, from trigeminal neuralgia meds:  getting better on metamucil   4. Vaginal atrophy: consider estrogen vaginal   5. Return in 3 months       Labs were reviewed in Epic   Imaging was reviewed in Epic   Tests and documents were reviewed.   Discussion of management or test interpretation       Dr. Starr Flores, DO    Obstetrics and Gynecology  [facility] [place]

## 2024-06-05 NOTE — PATIENT INSTRUCTIONS
Return in 3 months     Consider urology     Dr. Starr Flores, DO    Obstetrics and Gynecology  [facility] [place] [place]

## 2024-06-11 NOTE — TELEPHONE ENCOUNTER
DIAGNOSIS:   Right foot pain [M79.671]  - Primary  Mass of right foot [R22.41]   APPOINTMENT DATE: 06/12/2024   NOTES STATUS DETAILS   OFFICE NOTE from referring provider Internal 05/02/2024 - Casper Yang DPM - [facility] Podiatry   OFFICE NOTE from other specialist Internal 05/01/2024 - Andrés Pisano DO - [facility]     11/23/2010 - Nico Mejia DPM [facility] Podiatry   MRI PACS Internal   XRAYS (IMAGES & REPORTS) PACS Internal

## 2024-06-12 ENCOUNTER — PRE VISIT (OUTPATIENT)
Dept: ORTHOPEDICS | Facility: CLINIC | Age: 69
End: 2024-06-12

## 2024-06-12 ENCOUNTER — OFFICE VISIT (OUTPATIENT)
Dept: ORTHOPEDICS | Facility: CLINIC | Age: 69
End: 2024-06-12
Attending: PODIATRIST
Payer: COMMERCIAL

## 2024-06-12 VITALS — WEIGHT: 142 LBS | BODY MASS INDEX: 26.81 KG/M2 | HEIGHT: 61 IN

## 2024-06-12 DIAGNOSIS — R22.41 MASS OF RIGHT FOOT: ICD-10-CM

## 2024-06-12 DIAGNOSIS — M79.671 RIGHT FOOT PAIN: ICD-10-CM

## 2024-06-12 PROCEDURE — 99203 OFFICE O/P NEW LOW 30 MIN: CPT | Performed by: ORTHOPAEDIC SURGERY

## 2024-06-12 NOTE — PROGRESS NOTES
This patient has noticed some fullness between the second and third toes on the right foot and some discomfort and fullness on the plantar aspect of her foot and the third ray.  She said she had a plantar wart at that site but that it resolved spontaneously.    On examination she is alert oriented has a normal mood and affect and is in no acute distress.  There is no edema in the feet.  There may be some soft tissue fullness between the second and third toes.  The patient states that her area that she is concerned about is right at the metatarsal head of the third ray.  There is some increased callus thickness here but I do not feel soft tissue mass at the site.    I reviewed the MRI and the patient does have some subtle fatty fullness between the first and second rays and second and third toes.  This may be a lipoma but does not have any overt malignant signs to my eye.    This patient has noticed a fullness between her toes but today is really focused on the plantar metatarsal head as being the site of her main symptoms and concern.  This is not an area where we have a tumor on the MRI.  I would like her to visit with her podiatrist again and see if there is a strategy for addressing this area.  I sent the podiatrist note as well.  I am happy to take out this mass between the second and third toes but it will not have an impact on the plantar aspect of the foot.  I also do not think this is any features of being a sarcoma.  I have answered the patient's questions today.  The patient and podiatry will me know if they would like to proceed with soft tissue mass removal.

## 2024-06-12 NOTE — LETTER
6/12/2024      Nu Taylor  [address] [address] [address]      Dear Colleague,    Thank you for referring your patient, Nu Taylor, to the [facility] [place]. Please see a copy of my visit note below.    This patient has noticed some fullness between the second and third toes on the right foot and some discomfort and fullness on the plantar aspect of her foot and the third ray.  She said she had a plantar wart at that site but that it resolved spontaneously.    On examination she is alert oriented has a normal mood and affect and is in no acute distress.  There is no edema in the feet.  There may be some soft tissue fullness between the second and third toes.  The patient states that her area that she is concerned about is right at the metatarsal head of the third ray.  There is some increased callus thickness here but I do not feel soft tissue mass at the site.    I reviewed the MRI and the patient does have some subtle fatty fullness between the first and second rays and second and third toes.  This may be a lipoma but does not have any overt malignant signs to my eye.    This patient has noticed a fullness between her toes but today is really focused on the plantar metatarsal head as being the site of her main symptoms and concern.  This is not an area where we have a tumor on the MRI.  I would like her to visit with her podiatrist again and see if there is a strategy for addressing this area.  I sent the podiatrist note as well.  I am happy to take out this mass between the second and third toes but it will not have an impact on the plantar aspect of the foot.  I also do not think this is any features of being a sarcoma.  I have answered the patient's questions today.  The patient and podiatry will me know if they would like to proceed with soft tissue mass removal.        Stephon Liz MD

## 2024-06-12 NOTE — NURSING NOTE
"Reason For Visit:   Chief Complaint   Patient presents with    Consult     Right foot mass referred by Dr. Casper Yang. First aware of lump about 6 months ago          68 year old  1955      Primary MD: Andrés Pisano  Ref. MD: Casper Yang       Ht 1.56 m (5' 1.42\")   Wt 64.4 kg (142 lb)   LMP 10/22/2008 (Exact Date)   BMI 26.47 kg/m      Pain Assessment  Patient Currently in Pain: Marianoies            Ranjith Sifuentes ATC    "

## 2024-06-24 DIAGNOSIS — G50.0 TRIGEMINAL NEURALGIA: ICD-10-CM

## 2024-06-24 RX ORDER — GABAPENTIN 300 MG/1
CAPSULE ORAL
Qty: 180 CAPSULE | Refills: 0 | Status: SHIPPED | OUTPATIENT
Start: 2024-06-24 | End: 2024-09-16

## 2024-06-27 ENCOUNTER — OFFICE VISIT (OUTPATIENT)
Dept: PODIATRY | Facility: CLINIC | Age: 69
End: 2024-06-27
Payer: COMMERCIAL

## 2024-06-27 VITALS
WEIGHT: 142 LBS | SYSTOLIC BLOOD PRESSURE: 132 MMHG | DIASTOLIC BLOOD PRESSURE: 76 MMHG | HEIGHT: 61 IN | BODY MASS INDEX: 26.81 KG/M2

## 2024-06-27 DIAGNOSIS — M21.42 PES PLANUS OF BOTH FEET: ICD-10-CM

## 2024-06-27 DIAGNOSIS — M21.41 PES PLANUS OF BOTH FEET: ICD-10-CM

## 2024-06-27 DIAGNOSIS — D17.20 LIPOMA OF FOOT: Primary | ICD-10-CM

## 2024-06-27 DIAGNOSIS — M79.671 RIGHT FOOT PAIN: ICD-10-CM

## 2024-06-27 DIAGNOSIS — M77.41 METATARSALGIA OF RIGHT FOOT: ICD-10-CM

## 2024-06-27 PROCEDURE — 99213 OFFICE O/P EST LOW 20 MIN: CPT | Performed by: PODIATRIST

## 2024-06-27 NOTE — LETTER
6/27/2024      Nu Taylor  [address] [facility] [address]      Dear Colleague,    Thank you for referring your patient, Nu Taylor, to the [facility]. Please see a copy of my visit note below.    ASSESSMENT:  Encounter Diagnoses   Name Primary?     Lipoma of foot Yes     Right foot pain/discomfort      Metatarsalgia of right foot      Pes planus of both feet      MEDICAL DECISION MAKING:  We reviewed the MRI results and also discussed hallux limitus.    Although I did not necessarily appreciate or identify a soft tissue mass on palpatory exam, the MRI did find a circumscribed lesion below the third metatarsal head.  This is most consistent with lipoma.  She reports that it is increasing in size and causing more discomfort.  She inquires about surgery.    I explained that I am not opposed to surgery at the proper thing is to try conservative treatment first.  This is a form of metatarsalgia.    We specifically discussed how stiffer soled shoes can help as well as metatarsal padding and custom orthoses (or quality OTC arch supports).  She is to avoid barefoot walking and wear supportive house shoes.  Gentle calf stretching exercises were discussed.    Follow-up in 1 to 2 months.  If she finds that conservative recommendations do not help, I am happy to schedule same-day surgery and remove the suspected lipoma.    Disclaimer: This note consists of symbols derived from keyboarding, dictation and/or voice recognition software. As a result, there may be errors in the script that have gone undetected. Please consider this when interpreting information found in this chart.    Casper Yang, SABRA, FACFAS, [facility] Department of Podiatry/Foot & Ankle Surgery      ____________________________________________________________________    HPI:       Nu returns for plantar right forefoot pain.  I last evaluated her on 5/2/2024.  A subtle mass was palpable and she elected  to proceed with an MRI.  The MRI identified a fat intensity in the form of a somewhat circumscribed lesion.  This was most consistent with a subcutaneous lipoma.  However the report stated that a low-grade liposarcoma could not be excluded.  Therefore I referred her to orthopedic oncology.  She was evaluated by Dr. Stephon Liz.  He did not have a suspicion for any form of malignancy.  I greatly appreciate his expertise in input.  He reached out via staff messaging.  She was encouraged to follow-up with podiatry for options to offload the area for pain reduction.    Nu reports that the mass is growing.  She is starting to have more discomfort yet she says it is not painful.  She no longer can walk in bare feet or socks at home, hardwood tejas tile.  *  Past Medical History:   Diagnosis Date     Allergic rhinitis      HTN (hypertension)      Hyperlipidemia 08/06/2008     Trigeminal neuralgia    *  *  Past Surgical History:   Procedure Laterality Date     C section with Tubal ligation       HYSTERECTOMY VAGINAL  04/26/2023    with bladder repair     LAPAROSCOPIC SUSPENSION BLADDER NECK  2010    [facility]   *  *  Current Outpatient Medications   Medication Sig Dispense Refill     amLODIPine-benazepril (LOTREL) 10-40 MG capsule Take 1 capsule by mouth once daily 90 capsule 3     atorvastatin (LIPITOR) 20 MG tablet Take 1 tablet (20 mg) by mouth daily 90 tablet 3     carBAMazepine (TEGRETOL) 200 MG tablet TAKE 3 TABLETS(600 MG) BY MOUTH TWICE DAILY 540 tablet 1     chlorthalidone (HYGROTON) 25 MG tablet Take 0.5 tablets (12.5 mg) by mouth daily 45 tablet 1     estradiol (ESTRACE) 0.1 MG/GM vaginal cream Place 2 g vaginally twice a week 70 g 3     gabapentin (NEURONTIN) 300 MG capsule TAKE 1 CAPSULE(300 MG) BY MOUTH TWO TIMES DAILY 180 capsule 0     Multiple Vitamins-Minerals (CENTRUM PO)            EXAM:    Vitals: LMP 10/22/2008 (Exact Date)   BMI: There is no height or weight on file to calculate  BMI.    Vascular:  Pedal pulses are palpable for both the DP and PT arteries.  CFT < 3 sec.  No edema.       Neuro: Light touch sensation is intact to the L4, L5, S1 distributions  No evidence of weakness, spasticity, or contracture in the lower extremities.      Derm: Normal texture and turgor.  No erythema, ecchymosis, or cyanosis.  No open lesions.      Musculoskeletal:    Lower extremity muscle strength is normal. No gross deformities.  On palpatory exam, the plantar aspect of the third metatarsal head is more palpable than neighboring metatarsals.  I do not appreciate an obvious soft tissue mass.    Decrease in medial longitudinal arch, bilateral, with weightbearing.    5/23/24 MRI Right Foot:  Impression:     1. In proximity to patient marker plantar to the second-third proximal  phalanges there is a fat intensity, somewhat circumscribed lesion  measuring approximately 1.3 x 1.1 x 2.0 cm most compatible with a  subcutaneous lipoma. Given progression in size and discomfort it is  difficult to exclude a low-grade liposarcoma. Consider orthopedic  oncology referral to [facility].     2. Polyarticular degenerative change, moderate severe at the first  metatarsophalangeal joint.           [Consider Follow Up: Fat intensity mass, low-grade liposarcoma not  excluded]     This report will be copied to the [facility] to ensure a  provider acknowledges the finding. Access Center is available Monday  through Friday 8am-3:30 pm.     FAHAD WADE MD (Joe)       Again, thank you for allowing me to participate in the care of your patient.        Sincerely,        Casper Yang DPM

## 2024-06-27 NOTE — PATIENT INSTRUCTIONS
Thank you for choosing [facility] Podiatry / Foot & Ankle Surgery!    DR. ARSHAD'S CLINIC LOCATIONS:     [facility] TRIAGE LINE: [phone]   [address] [place] [address] APPOINTMENTS: [phone]   [place] [address] RADIOLOGY: [phone]   (Every other Tues - Wed - Fri PM) SET UP SURGERY: [phone]    PHYSICAL THERAPY: [phone]   [facility] BILLING QUESTIONS: [phone] [address] FAX: [phone]   [place] [address]    (Thurs & Fri AM)         CAPSULITIS / METATARSALGIA  All joints in the body are surrounded by a capsule, or a covering of soft tissue and ligaments. The capsule holds bones together and secretes joint fluid to help lubricate the joint. If a joint capsule is exposed to excessive force, it can develop microscopic tears and become inflamed. This commonly occurs in the foot due to mild variation in anatomy. Hammertoes, bunions, irregular bone length, joint immobility, etc. can all lead to excessive force on the joint. Capsule injury can also occur due to repetitive stress from exercise, insufficient support from shoes, excessive bare foot walking and excessive weight.      Conservative treatments include ice, rest from the aggravating activity, weight loss, orthotic inserts, improving shoes and shoe modifications. You can purchase an over the counter felt metatarsal pad to place in your shoes at [facility] or on [facility]. Appropriate shoes will protect the inflamed tissue improving the chances of healing. Avoidance of standing or walking barefoot, including around the house, is necessary to allow healing. Casts are sometimes used for more aggressive protection.  NSAIDs such as Advil are also used to help with pain and decreasing inflammation. If pain continues over a period of weeks with continuous rest and icing, Corticosteroid injections can be a treatment option to try and help decrease inflammation.    Surgery is often necessary to correct the underlying  "structural problem. Surgery might include shortening an excessively long bone, repairing bunion or hammertoe, lengthening a tight Achilles  tendon, etc. These are same day surgeries that might be pursued if more conservative measures fail to provide relief.      The inflamed joint capsule has the potential to completely tear. This will allow the toe to drift off the ground, curving toward the other toes. The involved toe may under or overlap the adjacent toes as drift continues. The pain may improve after the joint tears or this new position will be permanent. Surgery can address the toe alignment. Your goal of treating capsulitis is to avoid this scenario.        ** You can find felt metatarsal pads to place in your shoes at our [facility], [employer], [employer], or on [employer]     Dr. Yang likes the Hapad brand.    FOREFOOT PAIN RECOMMENDATIONS    1) Please consider stiffer/ rigid - soled shoes as much as possible. These take stress off of the ball of your foot. This might be short term, until pain resolves, or long term to keep pain controlled.    2) Avoid barefoot walking, walking in socks, flexible shoes, flip flops, shoes without arch support, etc.    3) It is a good idea to wear a shoe at all times, including when at home.    4) Consider purchasing a felt metatarsal pad.  A good brand is \"Hapad.\"  You can find these and others online.  Also, these can be built into custom/prescription arch supports.    5) Consider a quality over-the-counter arch support. These can be found at [employer].  Some of these have a metatarsal pad built in.   If Dr. Yang prescribed custom orthoses, please consider this option.    6) Ice and anti inflammatories can be helpful, earlier on in the process.  Anti inflammatories are not good for you, if taken for a long period of time.     7) Gentle calf stretching can take pressure off of the ball of your foot.    Calf/Achilles Stretching: Do the stretching " gently. Do not bounce or stretch to the point of pain. Hold each stretch for 30 seconds. Stretch 10 times per set, three sets per day. Morning, afternoon and evening. If your heel pain is very severe in the morning, consider doing the first set of stretches before you get out of bed.               Hold each stretch for 30 seconds. Stretch 10 times per set, three sets per day. Morning, afternoon and evening. If your heel pain is very severe in the morning, consider doing the first set of stretches before you get out of bed.     [place] ORTHOT[facility] LOCATIONS  [facility]  [address]  [place] [address]  Phone: [phone]  Fax: [phone] [facility]   [address]  [place] [address]  Phone: [phone]  Fax: [phone]   [facility] [place]  [address]  [place] [address]  Phone: [phone]  Fax: [phone] [facility]  [address]  [place] [address]  Phone: [phone]   Fax: [phone]   * Please call any location listed to make an appointment for a casting/fitting. Your referral was sent to their central office and they will all have the order on file.

## 2024-06-27 NOTE — PROGRESS NOTES
ASSESSMENT:  Encounter Diagnoses   Name Primary?    Lipoma of foot Yes    Right foot pain/discomfort     Metatarsalgia of right foot     Pes planus of both feet      MEDICAL DECISION MAKING:  We reviewed the MRI results and also discussed hallux limitus.    Although I did not necessarily appreciate or identify a soft tissue mass on palpatory exam, the MRI did find a circumscribed lesion below the third metatarsal head.  This is most consistent with lipoma.  She reports that it is increasing in size and causing more discomfort.  She inquires about surgery.    I explained that I am not opposed to surgery at the proper thing is to try conservative treatment first.  This is a form of metatarsalgia.    We specifically discussed how stiffer soled shoes can help as well as metatarsal padding and custom orthoses (or quality OTC arch supports).  She is to avoid barefoot walking and wear supportive house shoes.  Gentle calf stretching exercises were discussed.    Follow-up in 1 to 2 months.  If she finds that conservative recommendations do not help, I am happy to schedule same-day surgery and remove the suspected lipoma.    Disclaimer: This note consists of symbols derived from keyboarding, dictation and/or voice recognition software. As a result, there may be errors in the script that have gone undetected. Please consider this when interpreting information found in this chart.    Casper Yang DPM, FACFAS, MS    [facility]      ____________________________________________________________________    HPI:       Nu returns for plantar right forefoot pain.  I last evaluated her on 5/2/2024.  A subtle mass was palpable and she elected to proceed with an MRI.  The MRI identified a fat intensity in the form of a somewhat circumscribed lesion.  This was most consistent with a subcutaneous lipoma.  However the report stated that a low-grade liposarcoma could not be excluded.  Therefore  I referred her to orthopedic oncology.  She was evaluated by Dr. Stephon Liz.  He did not have a suspicion for any form of malignancy.  I greatly appreciate his expertise in input.  He reached out via staff messaging.  She was encouraged to follow-up with podiatry for options to offload the area for pain reduction.    Nu reports that the mass is growing.  She is starting to have more discomfort yet she says it is not painful.  She no longer can walk in bare feet or socks at home, hardwood tejas tile.  *  Past Medical History:   Diagnosis Date    Allergic rhinitis     HTN (hypertension)     Hyperlipidemia 08/06/2008    Trigeminal neuralgia    *  *  Past Surgical History:   Procedure Laterality Date    C section with Tubal ligation      HYSTERECTOMY VAGINAL  04/26/2023    with bladder repair    LAPAROSCOPIC SUSPENSION BLADDER NECK  2010    [facility]   *  *  Current Outpatient Medications   Medication Sig Dispense Refill    amLODIPine-benazepril (LOTREL) 10-40 MG capsule Take 1 capsule by mouth once daily 90 capsule 3    atorvastatin (LIPITOR) 20 MG tablet Take 1 tablet (20 mg) by mouth daily 90 tablet 3    carBAMazepine (TEGRETOL) 200 MG tablet TAKE 3 TABLETS(600 MG) BY MOUTH TWICE DAILY 540 tablet 1    chlorthalidone (HYGROTON) 25 MG tablet Take 0.5 tablets (12.5 mg) by mouth daily 45 tablet 1    estradiol (ESTRACE) 0.1 MG/GM vaginal cream Place 2 g vaginally twice a week 70 g 3    gabapentin (NEURONTIN) 300 MG capsule TAKE 1 CAPSULE(300 MG) BY MOUTH TWO TIMES DAILY 180 capsule 0    Multiple Vitamins-Minerals (CENTRUM PO)            EXAM:    Vitals: LMP 10/22/2008 (Exact Date)   BMI: There is no height or weight on file to calculate BMI.    Vascular:  Pedal pulses are palpable for both the DP and PT arteries.  CFT < 3 sec.  No edema.       Neuro: Light touch sensation is intact to the L4, L5, S1 distributions  No evidence of weakness, spasticity, or contracture in the lower extremities.      Derm: Normal  texture and turgor.  No erythema, ecchymosis, or cyanosis.  No open lesions.      Musculoskeletal:    Lower extremity muscle strength is normal. No gross deformities.  On palpatory exam, the plantar aspect of the third metatarsal head is more palpable than neighboring metatarsals.  I do not appreciate an obvious soft tissue mass.    Decrease in medial longitudinal arch, bilateral, with weightbearing.    5/23/24 MRI Right Foot:  Impression:     1. In proximity to patient marker plantar to the second-third proximal  phalanges there is a fat intensity, somewhat circumscribed lesion  measuring approximately 1.3 x 1.1 x 2.0 cm most compatible with a  subcutaneous lipoma. Given progression in size and discomfort it is  difficult to exclude a low-grade liposarcoma. Consider orthopedic  oncology referral to [facility].     2. Polyarticular degenerative change, moderate severe at the first  metatarsophalangeal joint.           [Consider Follow Up: Fat intensity mass, low-grade liposarcoma not  excluded]     This report will be copied to the [facility] to ensure a  provider acknowledges the finding. [facility] is available Monday  through Friday 8am-3:30 pm.     FAHAD WADE MD (Joe)

## 2024-06-28 ENCOUNTER — TELEPHONE (OUTPATIENT)
Dept: FAMILY MEDICINE | Facility: CLINIC | Age: 69
End: 2024-06-28
Payer: COMMERCIAL

## 2024-06-28 DIAGNOSIS — I10 BENIGN ESSENTIAL HYPERTENSION: ICD-10-CM

## 2024-06-28 RX ORDER — CHLORTHALIDONE 25 MG/1
12.5 TABLET ORAL DAILY
Qty: 45 TABLET | Refills: 0 | Status: SHIPPED | OUTPATIENT
Start: 2024-06-28 | End: 2024-09-16

## 2024-06-28 NOTE — TELEPHONE ENCOUNTER
Patient calling for refill on  chlorthalidone. This was prescribed by Susan Haase in Dr. Aliya smalls. Will be out prior to upcoming appointment.  Yana Ta,      84 yo male with h/o multiple medical problems sent to ER by home aide for AMS.   pt was seen and admitted yesterday for hyponatremia and fever - deemed to be viral.  upon d/c, pt was alert and oriented, but this afternoon, pt found to be lethargic.  pt is arousable and answers questions but appears lethargic.

## 2024-06-28 NOTE — TELEPHONE ENCOUNTER
Writer called and spoke with patient, regarding refill. She was very thankful and appreciated the call.

## 2024-07-12 ENCOUNTER — OFFICE VISIT (OUTPATIENT)
Dept: FAMILY MEDICINE | Facility: CLINIC | Age: 69
End: 2024-07-12
Payer: COMMERCIAL

## 2024-07-12 VITALS
HEART RATE: 84 BPM | BODY MASS INDEX: 26.81 KG/M2 | HEIGHT: 61 IN | TEMPERATURE: 98.7 F | SYSTOLIC BLOOD PRESSURE: 116 MMHG | RESPIRATION RATE: 16 BRPM | OXYGEN SATURATION: 98 % | DIASTOLIC BLOOD PRESSURE: 69 MMHG | WEIGHT: 142 LBS

## 2024-07-12 DIAGNOSIS — Z79.899 ON STATIN THERAPY: ICD-10-CM

## 2024-07-12 DIAGNOSIS — Z51.81 ENCOUNTER FOR THERAPEUTIC DRUG LEVEL MONITORING: ICD-10-CM

## 2024-07-12 DIAGNOSIS — G50.0 TRIGEMINAL NEURALGIA: ICD-10-CM

## 2024-07-12 DIAGNOSIS — I10 BENIGN ESSENTIAL HYPERTENSION: Primary | ICD-10-CM

## 2024-07-12 LAB
ALBUMIN SERPL BCG-MCNC: 4.5 G/DL (ref 3.5–5.2)
ALP SERPL-CCNC: 94 U/L (ref 40–150)
ALT SERPL W P-5'-P-CCNC: 26 U/L (ref 0–50)
ANION GAP SERPL CALCULATED.3IONS-SCNC: 10 MMOL/L (ref 7–15)
AST SERPL W P-5'-P-CCNC: 19 U/L (ref 0–45)
BILIRUB SERPL-MCNC: 0.2 MG/DL
BUN SERPL-MCNC: 15 MG/DL (ref 8–23)
CALCIUM SERPL-MCNC: 9.5 MG/DL (ref 8.8–10.2)
CARBAMAZEPINE SERPL-MCNC: 10.4 UG/ML (ref 4–12)
CHLORIDE SERPL-SCNC: 105 MMOL/L (ref 98–107)
CREAT SERPL-MCNC: 0.67 MG/DL (ref 0.51–0.95)
CREAT UR-MCNC: 135 MG/DL
DEPRECATED HCO3 PLAS-SCNC: 29 MMOL/L (ref 22–29)
EGFRCR SERPLBLD CKD-EPI 2021: >90 ML/MIN/1.73M2
ERYTHROCYTE [DISTWIDTH] IN BLOOD BY AUTOMATED COUNT: 11.9 % (ref 10–15)
GLUCOSE SERPL-MCNC: 122 MG/DL (ref 70–99)
HCT VFR BLD AUTO: 35.7 % (ref 35–47)
HGB BLD-MCNC: 12 G/DL (ref 11.7–15.7)
MCH RBC QN AUTO: 32.8 PG (ref 26.5–33)
MCHC RBC AUTO-ENTMCNC: 33.6 G/DL (ref 31.5–36.5)
MCV RBC AUTO: 98 FL (ref 78–100)
PLATELET # BLD AUTO: 316 10E3/UL (ref 150–450)
POTASSIUM SERPL-SCNC: 3.4 MMOL/L (ref 3.4–5.3)
PROT SERPL-MCNC: 7.5 G/DL (ref 6.4–8.3)
RBC # BLD AUTO: 3.66 10E6/UL (ref 3.8–5.2)
SODIUM SERPL-SCNC: 144 MMOL/L (ref 135–145)
WBC # BLD AUTO: 5.5 10E3/UL (ref 4–11)

## 2024-07-12 PROCEDURE — 80156 ASSAY CARBAMAZEPINE TOTAL: CPT | Performed by: FAMILY MEDICINE

## 2024-07-12 PROCEDURE — 99214 OFFICE O/P EST MOD 30 MIN: CPT | Performed by: FAMILY MEDICINE

## 2024-07-12 PROCEDURE — G0481 DRUG TEST DEF 8-14 CLASSES: HCPCS | Performed by: FAMILY MEDICINE

## 2024-07-12 PROCEDURE — 80053 COMPREHEN METABOLIC PANEL: CPT | Performed by: FAMILY MEDICINE

## 2024-07-12 PROCEDURE — 36415 COLL VENOUS BLD VENIPUNCTURE: CPT | Performed by: FAMILY MEDICINE

## 2024-07-12 PROCEDURE — 85027 COMPLETE CBC AUTOMATED: CPT | Performed by: FAMILY MEDICINE

## 2024-07-12 RX ORDER — RESPIRATORY SYNCYTIAL VIRUS VACCINE 120MCG/0.5
0.5 KIT INTRAMUSCULAR ONCE
Qty: 1 EACH | Refills: 0 | Status: CANCELLED | OUTPATIENT
Start: 2024-07-12 | End: 2024-07-12

## 2024-07-12 RX ORDER — CARBAMAZEPINE 200 MG/1
TABLET ORAL
Qty: 540 TABLET | Refills: 1 | Status: SHIPPED | OUTPATIENT
Start: 2024-07-12

## 2024-07-12 ASSESSMENT — ANXIETY QUESTIONNAIRES
GAD7 TOTAL SCORE: 0
2. NOT BEING ABLE TO STOP OR CONTROL WORRYING: NOT AT ALL
7. FEELING AFRAID AS IF SOMETHING AWFUL MIGHT HAPPEN: NOT AT ALL
8. IF YOU CHECKED OFF ANY PROBLEMS, HOW DIFFICULT HAVE THESE MADE IT FOR YOU TO DO YOUR WORK, TAKE CARE OF THINGS AT HOME, OR GET ALONG WITH OTHER PEOPLE?: NOT DIFFICULT AT ALL
1. FEELING NERVOUS, ANXIOUS, OR ON EDGE: NOT AT ALL
7. FEELING AFRAID AS IF SOMETHING AWFUL MIGHT HAPPEN: NOT AT ALL
4. TROUBLE RELAXING: NOT AT ALL
GAD7 TOTAL SCORE: 0
6. BECOMING EASILY ANNOYED OR IRRITABLE: NOT AT ALL
3. WORRYING TOO MUCH ABOUT DIFFERENT THINGS: NOT AT ALL
GAD7 TOTAL SCORE: 0
IF YOU CHECKED OFF ANY PROBLEMS ON THIS QUESTIONNAIRE, HOW DIFFICULT HAVE THESE PROBLEMS MADE IT FOR YOU TO DO YOUR WORK, TAKE CARE OF THINGS AT HOME, OR GET ALONG WITH OTHER PEOPLE: NOT DIFFICULT AT ALL
5. BEING SO RESTLESS THAT IT IS HARD TO SIT STILL: NOT AT ALL

## 2024-07-12 NOTE — PROGRESS NOTES
"  Assessment & Plan   See after visit summary and result note for helpful information and advice given to patient.    A total of 30 minutes was visit past medical management, current medical condition, on physical exam, and future management.    Trigeminal neuralgia    - carBAMazepine (TEGRETOL) 200 MG tablet  Dispense: 540 tablet; Refill: 1  - Carbamazepine total  - Drug Confirmation Panel Urine with Creatinine  - Comprehensive metabolic panel  - CBC with platelets  - Carbamazepine total    Benign essential hypertension    - Comprehensive metabolic panel    On statin therapy    - Comprehensive metabolic panel    Encounter for therapeutic drug level monitoring    - Drug Confirmation Panel Urine with Creatinine    BMI  Estimated body mass index is 26.46 kg/m  as calculated from the following:    Height as of this encounter: 1.56 m (5' 1.42\").    Weight as of this encounter: 64.4 kg (142 lb).           Enrique Judge is a 68 year old, presenting for the following health issues:  Recheck Medication        2024     3:07 PM   Additional Questions   Roomed by TED Haley   Accompanied by Self     History of Present Illness       Reason for visit:  Follow up with doctor to renew medications when     She eats 4 or more servings of fruits and vegetables daily.She consumes 1 sweetened beverage(s) daily.She exercises with enough effort to increase her heart rate 30 to 60 minutes per day.  She exercises with enough effort to increase her heart rate 5 days per week.   She is taking medications regularly.                 Review of Systems  Patient visit done to review medication management.    Last carbamazepine level was checked through a GP in [place].  We will check this visit.  She will use this result to discuss with her neurologist.  She is trying to decrease her dosage of this medication gradually.  Her trigeminal neuralgia symptoms seem to be doing better.    Goes to [facility] to address " "trigeminal neuralgia.     She would like me to be in charge of Rx of carbamazepine and gabapentin.  Considering this, she was agreeable to doing a urine drug screen for me today for medication compliance check.    She plans to see her neurologist every 6 months.     At this visit, she feels well overall.        Objective    /69 (BP Location: Right arm, Patient Position: Sitting, Cuff Size: Adult Large)   Pulse 84   Temp 98.7  F (37.1  C) (Oral)   Resp 16   Ht 1.56 m (5' 1.42\")   Wt 64.4 kg (142 lb)   LMP 10/22/2008 (Exact Date)   SpO2 98%   Breastfeeding No   BMI 26.46 kg/m    Body mass index is 26.46 kg/m .  Physical Exam   Genital exam: No visible skin abnormalities.  No urethral discharge noted. No inguinal hernia palpated while standing during a cough.    Heart: Heart rate is regular with 2/6 murmur (chronic per patient).    Lungs: Lungs are clear to auscultation with good airflow bilaterally.    Skin/extremities: Warm and dry, with no lower leg edema.    Results for orders placed or performed in visit on 07/12/24   Comprehensive metabolic panel     Status: Abnormal   Result Value Ref Range    Sodium 144 135 - 145 mmol/L    Potassium 3.4 3.4 - 5.3 mmol/L    Carbon Dioxide (CO2) 29 22 - 29 mmol/L    Anion Gap 10 7 - 15 mmol/L    Urea Nitrogen 15.0 8.0 - 23.0 mg/dL    Creatinine 0.67 0.51 - 0.95 mg/dL    GFR Estimate >90 >60 mL/min/1.73m2    Calcium 9.5 8.8 - 10.2 mg/dL    Chloride 105 98 - 107 mmol/L    Glucose 122 (H) 70 - 99 mg/dL    Alkaline Phosphatase 94 40 - 150 U/L    AST 19 0 - 45 U/L    ALT 26 0 - 50 U/L    Protein Total 7.5 6.4 - 8.3 g/dL    Albumin 4.5 3.5 - 5.2 g/dL    Bilirubin Total 0.2 <=1.2 mg/dL   CBC with platelets     Status: Abnormal   Result Value Ref Range    WBC Count 5.5 4.0 - 11.0 10e3/uL    RBC Count 3.66 (L) 3.80 - 5.20 10e6/uL    Hemoglobin 12.0 11.7 - 15.7 g/dL    Hematocrit 35.7 35.0 - 47.0 %    MCV 98 78 - 100 fL    MCH 32.8 26.5 - 33.0 pg    MCHC 33.6 31.5 - 36.5 " g/dL    RDW 11.9 10.0 - 15.0 %    Platelet Count 316 150 - 450 10e3/uL   Carbamazepine total     Status: Normal   Result Value Ref Range    Carbamazepine 10.4 4.0 - 12.0 ug/mL   Urine Drug Confirmation Panel     Status: Abnormal   Result Value Ref Range    Gabapentin (Neurontin) Present (A) Absent    Narrative    This test was developed and its performance characteristics determined by the [facility],  Special [facility] Laboratory. It has not been cleared or approved by the FDA. The laboratory is regulated under CLIA as qualified to perform high-complexity testing. This test is used for clinical purposes. It should not be regarded as investigational or for research.    Drugs with concentrations less than the cutoff will not be reported.  The drugs with applicable detection cutoff limits that are included within the Drug Confirmation Panel are:    The following drugs are detected with a cutoff of 3 ng/ml: FENTANYL    The following drugs are detected with a cutoff of 5 ng/mL: 6-ACETYLMORPHINE, BUPRENORPHINE, NALOXONE, NORBUPRENORPHINE, NORFENTANYL    The following drugs are detected with a cutoff of 10 ng/mL: SUFENTANIL    The following drugs are detected with a cutoff of 20 ng/mL: PCP (PHENCYCLIDINE)    The following drugs are detected with a cutoff of 50 ng/mL: 7-AMINOCLONAZEPAM, 7-AMINOFLUNITRAZEPAM, ALPRAZOLAM, AMPHETAMINE, A-OH-ALPRAZOLAM, A-OH-MIDAZOLAM, A-OH-TRIAZOLAM, BENZOYLECGONINE (Cocaine Metabolite), CLONAZEPAM, COCAETHYLENE (Cocaine Metabolite), CODEINE, DIAZEPAM, DIHYDROCODEINE, EDDP (Methadone Metabolite), HYDROCODONE, HYDROMORPHONE, LORAZEPAM, MDA (3,4-Methylenedioxyamphetamine), MDEA (3,1-Beiquasmewezva-B-ethylcathinone), MDMA (Methylenedioxyamphetamine,Ecstasy), MEPERIDINE, METHADONE, METHAMPHETAMINE, METHYLPHENIDATE (Ritalin), MORPHINE, NALTREXONE, N-DESMETH-TAPENTADOL, NORCODEINE, NORDIAZEPAM, NORMEPERIDINE, O-CHUCK-TRAMADOL, OXAZEPAM, OXYCODONE, OXYMORPHONE,  PROPOXYPHENE, RITALINIC ACID, TAPENTADOL, TEMAZEPAM, THEBAINE, TRAMADOL    The following drugs are detected with a cutoff of 100 ng/mL: GABAPENTIN, KETAMINE    The following drugs are detected with a cutoff of 200 ng/mL: PREGABALIN, XYLAZINE     Urine Creatinine for Drug Screen Panel     Status: None   Result Value Ref Range    Creatinine Urine for Drug Screen 135 mg/dL   Drug Confirmation Panel Urine with Creatinine     Status: Abnormal    Narrative    The following orders were created for panel order Drug Confirmation Panel Urine with Creatinine.  Procedure                               Abnormality         Status                     ---------                               -----------         ------                     Urine Drug Confirmation ...[[id number]]  Abnormal            Final result               Urine Creatinine for Frank...[[id number]]                      Final result                 Please view results for these tests on the individual orders.           Signed Electronically by: Andrés Pisano DO    Answers submitted by the patient for this visit:  Patient Health Questionnaire (Submitted on 2024)  If you checked off any problems, how difficult have these problems made it for you to do your work, take care of things at home, or get along with other people?: Not difficult at all  PHQ9 TOTAL SCORE: 3  MAURO-7 (Submitted on 2024)  MAURO 7 TOTAL SCORE: 0  General Questionnaire (Submitted on 2024)  Chief Complaint: Chronic problems general questions HPI Form  What is the reason for your visit today? : Follow up with doctor to renew medications when   How many servings of fruits and vegetables do you eat daily?: 4 or more  On average, how many sweetened beverages do you drink each day (Examples: soda, juice, sweet tea, etc.  Do NOT count diet or artificially sweetened beverages)?: 1  How many minutes a day do you exercise enough to make your heart beat faster?: 30 to 60  How many days a week do  you exercise enough to make your heart beat faster?: 5  How many days per week do you miss taking your medication?: 0

## 2024-07-17 LAB — GABAPENTIN UR QL CFM: PRESENT

## 2024-07-21 NOTE — PATIENT INSTRUCTIONS
It would be great to see you in October for a Medicare wellness exam!  You can get recommended vaccines at a likely lower cost through most pharmacies.

## 2024-08-10 ENCOUNTER — ANCILLARY PROCEDURE (OUTPATIENT)
Dept: MAMMOGRAPHY | Facility: CLINIC | Age: 69
End: 2024-08-10
Attending: FAMILY MEDICINE
Payer: COMMERCIAL

## 2024-08-10 PROCEDURE — 77063 BREAST TOMOSYNTHESIS BI: CPT | Mod: TC | Performed by: RADIOLOGY

## 2024-08-10 PROCEDURE — 77067 SCR MAMMO BI INCL CAD: CPT | Mod: TC | Performed by: RADIOLOGY

## 2024-08-15 ENCOUNTER — OFFICE VISIT (OUTPATIENT)
Dept: URGENT CARE | Facility: URGENT CARE | Age: 69
End: 2024-08-15
Payer: COMMERCIAL

## 2024-08-15 VITALS
OXYGEN SATURATION: 97 % | TEMPERATURE: 100.2 F | RESPIRATION RATE: 16 BRPM | SYSTOLIC BLOOD PRESSURE: 116 MMHG | DIASTOLIC BLOOD PRESSURE: 78 MMHG | HEART RATE: 102 BPM

## 2024-08-15 DIAGNOSIS — R05.1 ACUTE COUGH: Primary | ICD-10-CM

## 2024-08-15 PROCEDURE — 87635 SARS-COV-2 COVID-19 AMP PRB: CPT | Performed by: PHYSICIAN ASSISTANT

## 2024-08-15 PROCEDURE — 99213 OFFICE O/P EST LOW 20 MIN: CPT | Performed by: PHYSICIAN ASSISTANT

## 2024-08-15 RX ORDER — BENZONATATE 200 MG/1
200 CAPSULE ORAL 3 TIMES DAILY PRN
Qty: 30 CAPSULE | Refills: 0 | Status: SHIPPED | OUTPATIENT
Start: 2024-08-15

## 2024-08-15 ASSESSMENT — ENCOUNTER SYMPTOMS
FEVER: 1
RHINORRHEA: 1
SORE THROAT: 1
COUGH: 1
VOMITING: 1

## 2024-08-15 NOTE — PROGRESS NOTES
Assessment & Plan:        ICD-10-CM    1. Acute cough  R05.1 Symptomatic COVID-19 Virus (Coronavirus) by PCR Nose     benzonatate (TESSALON) 200 MG capsule            Plan/Clinical Decision Making:    Patient with cough, fever. Exposed to covid. High suspicion for covid. Covid PCR pending.   Interested in medication treatment. I did review medication options. Has interaction with Paxlovid and   Tegretol. Will wait for result and can be schedule for virtual visit to review other treatment options.   Rest, fluids, Tylenol, isolate. Can use tessalon for cough.     Return if symptoms worsen or fail to improve, for in 5-7 days.     At the end of the encounter, I discussed results, diagnosis, medications. Discussed red flags for immediate return to clinic/ER, as well as indications for follow up if no improvement. Patient understood and agreed to plan. Patient was stable for discharge.        Loida Morales PA-C on 8/15/2024 at 2:24 PM          Subjective:     HPI:    Nu is a 68 year old female who presents to clinic today for the following health issues:  Chief Complaint   Patient presents with    Cough     Yesterday, dizzy, headache, exposed to covid from son     HPI    Exposed to covid. Yesterday started feeling ill. Cold symptoms and fever.   Hasn't had home test.     Review of Systems   Constitutional:  Positive for fever.   HENT:  Positive for congestion, rhinorrhea and sore throat.    Respiratory:  Positive for cough.    Gastrointestinal:  Positive for vomiting (once).         Patient Active Problem List   Diagnosis    Actinic keratosis    Hallux rigidus    Hypercholesterolemia    Mitral valve stenosis, mild    Urinary frequency    DDD (degenerative disc disease), cervical    Seasonal allergic rhinitis    Trigeminal neuralgia    Complete tear of right rotator cuff    Benign essential hypertension    Cystocele, midline    Mixed stress and urge urinary incontinence    Intrinsic sphincter deficiency         Past Medical History:   Diagnosis Date    Allergic rhinitis     HTN (hypertension)     Hyperlipidemia 08/06/2008    Trigeminal neuralgia        Social History     Tobacco Use    Smoking status: Never    Smokeless tobacco: Never   Substance Use Topics    Alcohol use: Yes     Alcohol/week: 0.0 standard drinks of alcohol     Comment: occ             Objective:     Vitals:    08/15/24 1411   BP: 116/78   Pulse: 102   Resp: 16   Temp: 100.2  F (37.9  C)   SpO2: 97%         Physical Exam   EXAM:   Pleasant, alert, appropriate appearance. NAD.  Head Exam: Normocephalic, atraumatic.  Eye Exam:   non icteric/injection.    Ear Exam: TMs grey without bulging. Normal canals.  Normal pinna.  Nose Exam: Normal external nose.    OroPharynx Exam:  Moist mucous membranes. No erythema, pharynx without exudate or hypertrophy.  Neck/Thyroid Exam:  No LAD.    Chest/Respiratory Exam: CTAB.  Cardiovascular Exam: RRR. No murmur or rubs.      Results:  Results for orders placed or performed in visit on 08/15/24   Symptomatic COVID-19 Virus (Coronavirus) by PCR Nose     Status: Abnormal    Specimen: Nose; Swab   Result Value Ref Range    SARS CoV2 PCR Positive (A) Negative    Narrative    Testing was performed using the Aptima SARS-CoV-2 Assay on the  [employer] Instrument System. Additional information about this  Emergency Use Authorization (EUA) assay can be found via the Lab  Guide. This test should be ordered for the detection of SARS-CoV-2 in  individuals who meet SARS-CoV-2 clinical and/or epidemiological  criteria. Test performance is unknown in asymptomatic patients. This  test is for in vitro diagnostic use under the FDA EUA for  laboratories certified under CLIA to perform high complexity testing.  This test has not been FDA cleared or approved. A negative result  does not rule out the presence of PCR inhibitors in the specimen or  target RNA in concentration below the limit of detection for the  assay. The possibility of a false  negative should be considered if  the patient's recent exposure or clinical presentation suggests  COVID-19. This test was validated by the [facility]. This laboratory is certified under  the Clinical Laboratory Improvement Amendments of 1988 (CLIA-88) as  qualified to perform high complexity laboratory testing.

## 2024-08-16 ENCOUNTER — NURSE TRIAGE (OUTPATIENT)
Dept: NURSING | Facility: CLINIC | Age: 69
End: 2024-08-16
Payer: COMMERCIAL

## 2024-08-16 LAB — SARS-COV-2 RNA RESP QL NAA+PROBE: POSITIVE

## 2024-08-16 NOTE — TELEPHONE ENCOUNTER
Coronavirus (COVID-19) Notification    Caller Name (Patient, parent, daughter/son, grandparent, etc)  patient    Reason for call  Notify of Positive Coronavirus (COVID-19) lab results, assess symptoms,  review [facility] recommendations    Lab Result    Lab test:  2019-nCoV rRt-PCR or SARS-CoV-2 PCR    Oropharyngeal AND/OR nasopharyngeal swabs is POSITIVE for 2019-nCoV RNA/SARS-COV-2 PCR (COVID-19 virus)      Gather patient reported symptoms   Assessment   Current Symptoms at time of phone call, reported by patient: (if no symptoms, document: No symptoms] Light sympoms   Date of symptom(s) onset (if applicable) 8/14/2024     If at time of call, Patients symptoms have worsened, the Patient should contact 911 or have someone drive them to Emergency Dept promptly:    If Patient calling 911, inform 911 personal that you have tested positive for the Coronavirus (COVID-19).  Place mask on and await 911 to arrive.  If Emergency Dept, If possible, please have another adult drive you to the Emergency Dept but you need to wear mask when in contact with other people.      Treatment Options:   Is patient interested in discussing COVID treatment? No.        Review information with Patient    Your result was positive. This means you have COVID-19 (coronavirus).    How can I protect others?    These guidelines are for isolating before returning to work, school or .    If you DO have symptoms  Stay home and away from others   For at least 5 days after your symptoms started, AND  You are fever free for 24 hours (with no medicine that reduces fever), AND  Your other symptoms are better  Wear a mask for 10 full days anytime you are around others    If you DON'T have symptoms  Stay home and away from others for at least 5 days after your positive test  Wear a mask for 10 full days anytime you are around others    There may be different guidelines for healthcare facilities.  Please check with the specific sites before  arriving.    If you have been told by a doctor that you were severely ill with COVID-19 or are immunocompromised, you should isolate for at least 10 days.    You should not go back to work until you meet the guidelines above for ending your home isolation. You don't need to be retested for COVID-19 before going back to work--studies show that you won't spread the virus if it's been at least 10 days since your symptoms started (or 20 days, if you have a weak immune system).    Employers, schools, and daycares: This is an official notice for this person's medical guidelines for returning in-person.  They must meet the above guidelines before going back to work, school or  in person.    You will receive a positive COVID-19 letter via [employer] or the mail soon with additional self-care information.    Would you like me to review some of that information with you now?  No    If you were tested for an upcoming procedure, please contact your provider for next steps.    Fiorella Sosa

## 2024-08-16 NOTE — TELEPHONE ENCOUNTER
Situation   Patient calls requesting paxlovid.     Background   Htn  No hx of pe/asthma   UC for covid symptoms 8/15/24    Assessment   Symptoms started 8/14/24- day 2 today   + covid pcr 8/15/24    Symptoms   Cough- occasional productive yellow/green  Chills   Fever 102 on wed and Thursday   Muscles aches  Sinus drainage - clear   Diarrhea x1 last night.   Throat pain- throat lozenges help  Mild headache- goes down with Tylenol.     Recommendations   5-10 deep breathes an hour  Walk around every hour   Staying hydrated and eating well.   BP safe cough medication.   Scheduled virtual-     RN COVID TREATMENT VISIT  08/16/24      The patient has been triaged and does not require a higher level of care.    Nu Taylor  68 year old  Current weight? 142    Has the patient been seen by a primary care provider at an [facility] or [facility] within the past two years? Yes.   Have you been in close proximity to/do you have a known exposure to a person with a confirmed case of influenza? No.     General treatment eligibility:  Date of positive COVID test (PCR or at home)?  8/15/24    Are you or have you been hospitalized for this COVID-19 infection? No.   Have you received monoclonal antibodies or antiviral treatment for COVID-19 since this positive test? No.   Do you have any of the following conditions that place you at risk of being very sick from COVID-19?   - Age 50 years or older  Yes, patient has at least one high risk condition as noted above.     Current COVID symptoms:   - fever or chills  - headache  - sore throat  - congestion or runny nose  - diarrhea  Yes. Patient has at least one symptom as selected.     How many days since symptoms started? 5 days or less. Established patient, 12 years or older weighing at least 88.2 lbs, who has symptoms that started in the past 5 days, has not been hospitalized nor received treatment already, and is at risk for being very sick from COVID-19.      Treatment eligibility by RN:  Are you currently pregnant or nursing? No  Do you have a clinically significant hypersensitivity to nirmatrelvir or ritonavir, or toxic epidermal necrolysis (TEN) or Zelaya-Mariano Syndrome? No  Do you have a history of hepatitis, any hepatic impairment on the Problem List (such as Child-Ladd Class C, cirrhosis, fatty liver disease, alcoholic liver disease), or was the last liver lab (hepatic panel, ALT, AST, ALK Phos, bilirubin) elevated in the past 6 months? No  Do you have any history of severe renal impairment (eGFR < 30mL/min)? No    Is patient eligible to continue? Yes, patient meets all eligibility requirements for the RN COVID treatment (as denoted by all no responses above).     2 tabs of Tegretol 2 times a day   Patient DOES NOT use the estrace vaginal cream.   Current Outpatient Medications   Medication Sig Dispense Refill    amLODIPine-benazepril (LOTREL) 10-40 MG capsule Take 1 capsule by mouth once daily 90 capsule 3    atorvastatin (LIPITOR) 20 MG tablet Take 1 tablet (20 mg) by mouth daily 90 tablet 3    benzonatate (TESSALON) 200 MG capsule Take 1 capsule (200 mg) by mouth 3 times daily as needed 30 capsule 0    carBAMazepine (TEGRETOL) 200 MG tablet TAKE 3 TABLETS(600 MG) BY MOUTH TWICE DAILY 540 tablet 1    chlorthalidone (HYGROTON) 25 MG tablet Take 0.5 tablets (12.5 mg) by mouth daily 45 tablet 0    estradiol (ESTRACE) 0.1 MG/GM vaginal cream Place 2 g vaginally twice a week 70 g 3    gabapentin (NEURONTIN) 300 MG capsule TAKE 1 CAPSULE(300 MG) BY MOUTH TWO TIMES DAILY 180 capsule 0    Multiple Vitamins-Minerals (CENTRUM PO)          Medications from List 1 of the standing order (on medications that exclude the use of Paxlovid) that patient is taking: carbamazepine (Tegretol) Is patient taking [place] Wort? No  Is patient taking [place]ort or any meds from List 1? Yes. Patient informed that they will need a provider visit to discuss treatment options.  Assist patient in scheduling a provider visit. - done tomorrow  at 1030am   Andreea Banks, RN          Reason for Disposition   COVID-19 diagnosed by positive lab test (e.g., PCR, rapid self-test kit) and mild symptoms (e.g., cough, fever, others) and no complications or SOB    Additional Information   Negative: SEVERE difficulty breathing (e.g., struggling for each breath, speaks in single words)   Negative: Difficult to awaken or acting confused (e.g., disoriented, slurred speech)   Negative: Bluish (or gray) lips or face now   Negative: Shock suspected (e.g., cold/pale/clammy skin, too weak to stand, low BP, rapid pulse)   Negative: Sounds like a life-threatening emergency to the triager   Negative: Diagnosed or suspected COVID-19 and symptoms lasting 3 or more weeks   Negative: COVID-19 exposure and no symptoms   Negative: COVID-19 vaccine reaction suspected (e.g., fever, headache, muscle aches) occurring 1 to 3 days after getting vaccine   Negative: COVID-19 vaccine, questions about   Negative: Lives with someone known to have influenza (flu test positive) and flu-like symptoms (e.g., cough, runny nose, sore throat, SOB; with or without fever)   Negative: Possible COVID-19 symptoms and triager concerned about severity of symptoms or other causes   Negative: COVID-19 and breastfeeding, questions about   Negative: SEVERE or constant chest pain or pressure  (Exception: Mild central chest pain, present only when coughing.)   Negative: MODERATE difficulty breathing (e.g., speaks in phrases, SOB even at rest, pulse 100-120)   Negative: Headache and stiff neck (can't touch chin to chest)   Negative: Oxygen level (e.g., pulse oximetry) 90% or lower   Negative: Chest pain or pressure  (Exception: MILD central chest pain, present only when coughing.)   Negative: Drinking very little and dehydration suspected (e.g., no urine > 12 hours, very dry mouth, very lightheaded)   Negative: Patient sounds very sick or weak to  the triager   Negative: MILD difficulty breathing (e.g., minimal/no SOB at rest, SOB with walking, pulse <100)   Negative: Fever > 103 F (39.4 C)   Negative: Fever > 101 F (38.3 C) and over 60 years of age   Negative: Fever > 100.0 F (37.8 C) and bedridden (e.g., CVA, chronic illness, recovering from surgery)   Negative: HIGH RISK patient and influenza is widespread in the community and ONE OR MORE respiratory symptoms: cough, sore throat, runny or stuffy nose   Negative: HIGH RISK patient and influenza exposure within the last 7 days and ONE OR MORE respiratory symptoms: cough, sore throat, runny or stuffy nose   Negative: Oxygen level (e.g., pulse oximetry) 91 to 94%   Negative: COVID-19 infection suspected by caller or triager and mild symptoms (cough, fever, or others) and negative COVID-19 rapid test   Negative: Fever present > 3 days (72 hours)   Negative: Fever returns after gone for over 24 hours and symptoms worse or not improved   Negative: Continuous (nonstop) coughing interferes with work or school and no improvement using cough treatment per Care Advice   Negative: Cough present > 3 weeks   Negative: COVID-19 diagnosed by positive lab test (e.g., PCR, rapid self-test kit) and NO symptoms (e.g., cough, fever, others)   Negative: HIGH RISK patient (e.g., weak immune system, age > 64 years, obesity with BMI of 30 or higher, pregnant, chronic lung disease or other chronic medical condition) and COVID symptoms (e.g., cough, fever)  (Exceptions: Already seen by doctor or NP/PA and no new or worsening symptoms.)    Protocols used: Coronavirus (COVID-19) Diagnosed or [id number]

## 2024-08-17 ENCOUNTER — VIRTUAL VISIT (OUTPATIENT)
Dept: URGENT CARE | Facility: CLINIC | Age: 69
End: 2024-08-17
Payer: COMMERCIAL

## 2024-08-17 DIAGNOSIS — U07.1 INFECTION DUE TO 2019 NOVEL CORONAVIRUS: Primary | ICD-10-CM

## 2024-08-17 DIAGNOSIS — G50.0 TRIGEMINAL NEURALGIA: ICD-10-CM

## 2024-08-17 PROCEDURE — 99443 PR PHYSICIAN TELEPHONE EVALUATION 21-30 MIN: CPT | Mod: 93

## 2024-08-17 NOTE — PROGRESS NOTES
"  Nu Taylor is a 68 year old female who is being evaluated via a billable telephone visit.      The patient has been notified of following at the time patient scheduled visit:     \"This telephone visit will be conducted via a phone call between you and your physician/provider. We have found that certain health care needs can be provided without the need for a physical exam.  This service lets us provide the care you need with a phone conversation.  If a prescription is necessary we can send it directly to your pharmacy.  If lab work is needed we can place an order for that and you can then stop by our lab to have the test done at a later time.\"   Patient has given consent for telephone visit?  Yes    SUBJECTIVE:  Nu Taylor is an 68 year old female who presents for covid.  Sxs starated three days ago.  Has cough and nasal congestion.  Has sore throat which seems mostly from coughing.  Feels a little dizzy in morning when she gets up, but resolves once up a while.  Fever to 100.2.  vomited once  no diarrhea.  Has chills.  Had positive test at urgent care two days ago.  Has been vaccinated for covid.  Has not had covid before.    PMH:   has a past medical history of Allergic rhinitis, HTN (hypertension), Hyperlipidemia (08/06/2008), and Trigeminal neuralgia.  Patient Active Problem List   Diagnosis    Actinic keratosis    Hallux rigidus    Hypercholesterolemia    Mitral valve stenosis, mild    Urinary frequency    DDD (degenerative disc disease), cervical    Seasonal allergic rhinitis    Trigeminal neuralgia    Complete tear of right rotator cuff    Benign essential hypertension    Cystocele, midline    Mixed stress and urge urinary incontinence    Intrinsic sphincter deficiency     Social History     Socioeconomic History    Marital status:      Spouse name: Thomas    Number of children: 4   Occupational History    Occupation: bus      Employer: [employer]      Comment: [employer] "   Tobacco Use    Smoking status: Never    Smokeless tobacco: Never   Vaping Use    Vaping status: Never Used   Substance and Sexual Activity    Alcohol use: Yes     Alcohol/week: 0.0 standard drinks of alcohol     Comment: occ    Drug use: No    Sexual activity: Yes     Partners: Male     Birth control/protection: None   Other Topics Concern    Parent/sibling w/ CABG, MI or angioplasty before 65F 55M? No     Service No    Blood Transfusions No    Caffeine Concern No    Occupational Exposure No    Hobby Hazards No    Sleep Concern Yes    Stress Concern Yes    Weight Concern No    Special Diet Yes     Comment: low sodium    Back Care No    Exercise Yes     Comment: 2 x per week    Bike Helmet Yes    Seat Belt Yes    Self-Exams Yes     Social Determinants of Health     Financial Resource Strain: Low Risk  (10/17/2022)    Overall Financial Resource Strain (CARDIA)     Difficulty of Paying Living Expenses: Not hard at all   Food Insecurity: Food Insecurity Present (10/17/2022)    Hunger Vital Sign     Worried About Running Out of Food in the Last Year: Sometimes true     Ran Out of Food in the Last Year: Never true   Transportation Needs: No Transportation Needs (10/17/2022)    PRAPARE - Transportation     Lack of Transportation (Medical): No     Lack of Transportation (Non-Medical): No   Physical Activity: Sufficiently Active (10/17/2022)    Exercise Vital Sign     Days of Exercise per Week: 5 days     Minutes of Exercise per Session: 40 min   Stress: No Stress Concern Present (10/17/2022)    [employer] - Occupational Stress Questionnaire     Feeling of Stress : Not at all   Social Connections: Moderately Isolated (10/17/2022)    Social Connection and Isolation Panel [NHANES]     Frequency of Communication with Friends and Family: More than three times a week     Frequency of Social Gatherings with Friends and Family: Once a week     Attends [employer] Services: Never     Active  Member of Clubs or Organizations: No     Marital Status:    Interpersonal Safety: Low Risk  (5/1/2024)    Interpersonal Safety     Do you feel physically and emotionally safe where you currently live?: Yes     Within the past 12 months, have you been hit, slapped, kicked or otherwise physically hurt by someone?: No     Within the past 12 months, have you been humiliated or emotionally abused in other ways by your partner or ex-partner?: No   Housing Stability: Low Risk  (10/17/2022)    Housing Stability Vital Sign     Unable to Pay for Housing in the Last Year: No     Number of Places Lived in the Last Year: 0     Unstable Housing in the Last Year: No     Family History   Problem Relation Age of Onset    No Known Problems Mother     No Known Problems Father     Leukemia Maternal Grandmother     Diabetes Son         type 1    Family History Negative No family hx of     C.A.D. No family hx of     Cancer - colorectal No family hx of        ALLERGIES:  Norco [hydrocodone-acetaminophen] and Penicillins    Current Outpatient Medications   Medication Sig Dispense Refill    amLODIPine-benazepril (LOTREL) 10-40 MG capsule Take 1 capsule by mouth once daily 90 capsule 3    atorvastatin (LIPITOR) 20 MG tablet Take 1 tablet (20 mg) by mouth daily 90 tablet 3    benzonatate (TESSALON) 200 MG capsule Take 1 capsule (200 mg) by mouth 3 times daily as needed 30 capsule 0    carBAMazepine (TEGRETOL) 200 MG tablet TAKE 3 TABLETS(600 MG) BY MOUTH TWICE DAILY 540 tablet 1    chlorthalidone (HYGROTON) 25 MG tablet Take 0.5 tablets (12.5 mg) by mouth daily 45 tablet 0    estradiol (ESTRACE) 0.1 MG/GM vaginal cream Place 2 g vaginally twice a week 70 g 3    gabapentin (NEURONTIN) 300 MG capsule TAKE 1 CAPSULE(300 MG) BY MOUTH TWO TIMES DAILY 180 capsule 0    Multiple Vitamins-Minerals (CENTRUM PO)        No current facility-administered medications for this visit.         ROS:  ROS is done and is negative for  general/constitutional, eye, ENT, Respiratory, cardiovascular, GI, , Skin, musculoskeletal except as noted elsewhere.  All other review of systems negative except as noted elsewhere.      OBJECTIVE:    No vital signs taken as is virtual visit.  GENERAL : Alert and oriented.  No distress detected.    RESP: No respiratory distress detected during phone conversation         ASSESSMENT/PLAN:    ASSESSMENT / PLAN:  (U07.1) Infection due to 2019 novel coronavirus  (primary encounter diagnosis)  Comment: has sxs but has improved a little today compared to past couple days.  Has been vaccinated for covid.    Plan: given pt's tegretol use, paxlovid is not an option as cannot abruptly stop the tegretol without withdrawal sxs. Discussed molnupiravir vs no meds and pt opted for no meds.  Advised that can use the benzonatate she was prescribed two days ago. I reviewed supportive care, otc meds to use if needed, expected course, and signs of concern.  Follow up as needed or if does not improve within 5 day(s) or if worsens in any way.  Reviewed red flag symptoms and is to go to the ER if experiences any of these.    (G50.0) Trigeminal neuralgia  Comment: has hx of.    Plan: the tegretol she takes for this prevents use of paxlovid due to med interactions and cannot abruptly stop the tegretol without significant withdrawal sx risk.  Continue current meds.        See [facility] for orders, medications, letters, patient instructions    Cecilia Dooley MD  8/17/2024, 10:23 AM      Phone call duration:  21 minutes   [place]

## 2024-08-21 DIAGNOSIS — E78.2 MIXED HYPERLIPIDEMIA: ICD-10-CM

## 2024-08-21 RX ORDER — ATORVASTATIN CALCIUM 20 MG/1
20 TABLET, FILM COATED ORAL DAILY
Qty: 90 TABLET | Refills: 3 | OUTPATIENT
Start: 2024-08-21

## 2024-08-21 NOTE — TELEPHONE ENCOUNTER
Medication Question or Refill        What medication are you calling about (include dose and sig)?: Atorvastatin 20mg tablet 1x daily    Preferred Pharmacy:   [id number] - [place], [place] - [address]  [address] [address] [address]  Phone: [phone] Fax: [phone]      Controlled Substance Agreement on file:   CSA -- Patient Level:    CSA: None found at the patient level.       Who prescribed the medication?: Susan Haase    Do you need a refill? Yes    When did you use the medication last? today    Patient offered an appointment? No    Do you have any questions or concerns?  No      Could we send this information to you in [facility] or would you prefer to receive a phone call?:   Patient would prefer a phone call   Okay to leave a detailed message?: Yes at Home number on file [phone] (home)

## 2024-08-23 ENCOUNTER — NURSE TRIAGE (OUTPATIENT)
Dept: NURSING | Facility: CLINIC | Age: 69
End: 2024-08-23
Payer: COMMERCIAL

## 2024-08-23 NOTE — TELEPHONE ENCOUNTER
"Pt calling wondering if she can get reinfected with COVID. Pt was dx with COVID on 8/15 and continues to have moderate symptoms. Her  now has COVID. She is wondering if she can be reinfected. Discussed the following information from the CDC's website. \"Reinfection can occur as early as several weeks after a previous infection, although this is rare.\" They are currently isolating from each other. She had no further questions.    Sameera Morse RN  [facility] Nurse Advisor   8/23/2024  6:43 PM    Additional Information   Question about upcoming scheduled test, no triage required and triager able to answer question    Protocols used: Information Only Call-A-AH    "

## 2024-08-26 DIAGNOSIS — E78.2 MIXED HYPERLIPIDEMIA: ICD-10-CM

## 2024-08-26 RX ORDER — ATORVASTATIN CALCIUM 20 MG/1
20 TABLET, FILM COATED ORAL DAILY
Qty: 90 TABLET | Refills: 0 | Status: SHIPPED | OUTPATIENT
Start: 2024-08-26

## 2024-08-29 ENCOUNTER — OFFICE VISIT (OUTPATIENT)
Dept: UROLOGY | Facility: CLINIC | Age: 69
End: 2024-08-29
Attending: FAMILY MEDICINE
Payer: COMMERCIAL

## 2024-08-29 VITALS
WEIGHT: 142 LBS | DIASTOLIC BLOOD PRESSURE: 82 MMHG | BODY MASS INDEX: 26.81 KG/M2 | HEIGHT: 61 IN | SYSTOLIC BLOOD PRESSURE: 126 MMHG

## 2024-08-29 DIAGNOSIS — R39.9 UTI SYMPTOMS: ICD-10-CM

## 2024-08-29 DIAGNOSIS — R31.29 MICROSCOPIC HEMATURIA: ICD-10-CM

## 2024-08-29 DIAGNOSIS — N39.41 URGE INCONTINENCE OF URINE: Primary | ICD-10-CM

## 2024-08-29 LAB
ALBUMIN UR-MCNC: NEGATIVE MG/DL
APPEARANCE UR: CLEAR
BILIRUB UR QL STRIP: NEGATIVE
COLOR UR AUTO: YELLOW
GLUCOSE UR STRIP-MCNC: NEGATIVE MG/DL
HGB UR QL STRIP: ABNORMAL
KETONES UR STRIP-MCNC: NEGATIVE MG/DL
LEUKOCYTE ESTERASE UR QL STRIP: ABNORMAL
MUCOUS THREADS #/AREA URNS LPF: PRESENT /LPF
NITRATE UR QL: NEGATIVE
PH UR STRIP: 7 [PH] (ref 5–7)
RBC URINE: 1 /HPF
SP GR UR STRIP: 1.02 (ref 1–1.03)
SQUAMOUS EPITHELIAL: <1 /HPF
UROBILINOGEN UR STRIP-ACNC: 0.2 E.U./DL
WBC URINE: 1 /HPF

## 2024-08-29 PROCEDURE — 99204 OFFICE O/P NEW MOD 45 MIN: CPT | Mod: 25 | Performed by: PHYSICIAN ASSISTANT

## 2024-08-29 PROCEDURE — 81001 URINALYSIS AUTO W/SCOPE: CPT | Mod: QW | Performed by: PHYSICIAN ASSISTANT

## 2024-08-29 PROCEDURE — 87086 URINE CULTURE/COLONY COUNT: CPT | Performed by: PHYSICIAN ASSISTANT

## 2024-08-29 PROCEDURE — 51798 US URINE CAPACITY MEASURE: CPT | Performed by: PHYSICIAN ASSISTANT

## 2024-08-29 ASSESSMENT — PAIN SCALES - GENERAL: PAINLEVEL: NO PAIN (0)

## 2024-08-29 NOTE — PROGRESS NOTES
Subjective      REQUESTING PROVIDER   Starr Flores     REASON FOR CONSULT   Urinary incontinence    HISTORY OF PRESENT ILLNESS   Ms. Taylor is very pleasant 69 year old year old, , female, who presents today for further evaluation recommendations regarding urinary incontinence.  Patient underwent SPARC in .  Urodynamic study in  noted massive stress incontinence and she also had a cystocele.  Patient underwent a TVH and BSO with urethral sling in  in [place].  She had findings of possible rectocele in .    Patient was given a trial of topical estrogen.  She did note that she tried this for approximately 2 weeks, and made urinary symptoms worse and gave her vaginal itching.    Patient does have a significant incontinence.  She will leak through her close.  She is going through approximately 12 pads per day.  She is urinating every 30 minutes to an hour.  She also endorses a history of urinary tract infections.  Patient denies any hematuria, dysuria, or nephrolithiasis.  She endorses issues with urgency and frequency.  She does have a history of chronic constipation and it is still hard for her to poop.  She uses Metamucil regularly.  Patient endorses urge incontinence, but denies significant stress incontinence.  She has nocturia every 2 hours.  She does restrict fluids prior to bedtime.  After her surgery in [place] in 2023, she did note that her urinary control was good for about a year.    She does have to drink quite a bit of water due to her medication for trigeminal neuralgia.  She previously trialed Myrbetriq which caused concerns with her blood pressure.  She has previously done pelvic floor physical therapy.  She is also previously trialed oxybutynin and Detrol without significant improvement.  Also appears that she trialed Vesicare back in  with Dr. Molina.    Urinalysis does show blood today.  She denies taking any blood thinning medications.  Denies jobs with  "significant chemical exposure.      She is concerned about some of the medications due to already having issues with mental fogginess.    The following portions of the patient's history were reviewed and updated as appropriate: allergies, current medications, past social history, past surgical history, and problem list.     REVIEW OF SYSTEMS   Review of Systems   Constitutional:  Negative for chills and fever.   Respiratory:  Positive for cough. Negative for shortness of breath.    Cardiovascular:  Negative for chest pain.   Gastrointestinal:  Positive for constipation. Negative for nausea and vomiting.   Genitourinary:  Positive for frequency and urgency. Negative for dysuria and hematuria.      Per HPI.     Patient Active Problem List   Diagnosis    Actinic keratosis    Hallux rigidus    Hypercholesterolemia    Mitral valve stenosis, mild    Urinary frequency    DDD (degenerative disc disease), cervical    Seasonal allergic rhinitis    Trigeminal neuralgia    Complete tear of right rotator cuff    Benign essential hypertension    Cystocele, midline    Mixed stress and urge urinary incontinence    Intrinsic sphincter deficiency      Past Medical History:   Diagnosis Date    Allergic rhinitis     HTN (hypertension)     Hyperlipidemia 08/06/2008    Trigeminal neuralgia       Past Surgical History:   Procedure Laterality Date    BLADDER SURGERY      C section with Tubal ligation      CYSTOSCOPY      HYSTERECTOMY VAGINAL  04/26/2023    with bladder repair    LAPAROSCOPIC SUSPENSION BLADDER NECK  2010    SPARC      Social History:   .  Never smoker.     Family History:   Family history of nephrolithiasis in her brother.  No known family history of  malignancy.     Objective      PHYSICAL EXAM   /82   Ht 1.56 m (5' 1.42\")   Wt 64.4 kg (142 lb)   LMP 10/22/2008 (Exact Date)   BMI 26.46 kg/m     Physical Exam  Constitutional:       Appearance: Normal appearance.   HENT:      Head: Normocephalic.      " Nose: Nose normal.   Eyes:      General: No scleral icterus.  Pulmonary:      Effort: Pulmonary effort is normal.   Abdominal:      General: There is no distension.   Musculoskeletal:         General: Normal range of motion.      Cervical back: Normal range of motion.   Skin:     General: Skin is warm and dry.   Neurological:      Mental Status: She is alert and oriented to person, place, and time.      Motor: Tremor present.   Psychiatric:         Mood and Affect: Mood normal.         Behavior: Behavior normal.        LABORATORY   Recent Labs   Lab Test 08/29/24  1404 06/05/24  1451 05/09/24  1321   COLOR Yellow   < > Yellow   APPEARANCE Clear   < > Clear   URINEGLC Negative   < > Negative   URINEBILI Negative   < > Negative   URINEKETONE Negative   < > Negative   SG 1.020   < > 1.010   UBLD Trace*   < > Small*   URINEPH 7.0   < > 6.5   PROTEIN Negative   < > Negative   UROBILINOGEN 0.2   < > 0.2   NITRITE Negative   < > Negative   LEUKEST Trace*   < > Large*   RBCU  --   --  2-5*   WBCU  --   --  *    < > = values in this interval not displayed.     TESTING    PVR: 21 mL    Assessment & Plan    1. Urge incontinence of urine    2. Microscopic hematuria    3. UTI symptoms        I had the pleasure today of meeting with Ms. Taylor to discuss urinary incontinence.  Patient has had 2 previous procedures for stress incontinence.  She denies significant issues with stress incontinence at this time.  Her main concern is issues with urgency, frequency, and urge incontinence.  She has previously trialed pelvic floor physical therapy as well as several other medications.  He has been having issues with mental fogginess.  Therefore, would be concerned about trial of an anticholinergic other than trospium.    We discussed other overactive bladder treatment options including a beta 3 agonist, posterior tibial nerve stimulation, Botox, and implantable device like Axonics or InterStim.    Patient also has findings concerning  for possible blood in the urine.  If she does have microscopic hematuria, we did discuss that microscopic hematuria evaluation would be recommended including CT urogram and cystoscopy.  Urinalysis is questionable for possible urinary tract infection.  Would be reasonable to look for possible infection, as patient is concerned about worsening of her mental fogginess with additional medication options.    She is uncertain of how she would like to proceed.    Will plan on the following:    -Will send urine for microscopy.  If positive for microscopic blood, would recommend CT Urogram and cystoscopy.    -Will send urine for culture.  If positive, will treat with culture specific antibiotics.    -Other options for urgency, frequency, and urge incontinence would be Botox, posterior tibial nerve stimulation, or implantable device like InterStim or Axonics.    -If you are interested in any of these, let me know.  If you would like to do Botox, would need to talk to Dr. Corona in [place].  Posterior tibial nerve stimulation is in [place] too.  Could trial another medication.  Would only consider Gemtesa or trospium given your mental fogginess.    -She will let me know how she would like to proceed.    -Could consider cystoscopy and pelvic at the same time and urodynamics for further testing/evaluation in the future.    Contact us in the interim with questions, concerns, or changes in symptomatology.    Signed by:     Vonnie Couch PA-C 8/29/2024 2:22 PM

## 2024-08-29 NOTE — NURSING NOTE
Chief Complaint   Patient presents with    Urinary incontinance    Urinary Frequency     Pt tried estrogen cream for two weeks but ceased.  States she was feeling worse and itchy when using the cream.  Denies gross hematuria or dysuria.    Hx of bladder sling and hysterectomy    PVR: 21 mL by bladder scan    Rona Whittington, EMT

## 2024-08-29 NOTE — PATIENT INSTRUCTIONS
Will send urine for microscopy.  If positive for microscopic blood, would recommend CT Urogram and cystoscopy.    Will send urine for culture.  If positive, will treat with culture specific antibiotics.    Other options for urgency, frequency, and urge incontinence would be Botox, posterior tibial nerve stimulation, or implantable device like InterStim or Axonics.    If you are interested in any of these, let me know.  If you would like to do Botox, would need to talk to Dr. Corona in [place].  Posterior tibial nerve stimulation is in [place] too.  Could trial another medication.  Would only consider Gemtesa or trospium given your mental fogginess.    Let me know how you would like to proceed.    Could consider cystoscopy and pelvic at the same time and urodynamics for further testing/evaluation in the future.    Contact us in the interim with questions, concerns, or changes in symptomatology.  [phone]

## 2024-08-29 NOTE — LETTER
2024       RE: Nu Taylor  [address] [facility] [address]     Dear Colleague,    Thank you for referring your patient, Nu Taylor, to the [facility]OLOGY [facility] [place] [facility]. Please see a copy of my visit note below.    Subjective     REQUESTING PROVIDER   Starr Flores     REASON FOR CONSULT   Urinary incontinence    HISTORY OF PRESENT ILLNESS   Ms. Taylor is very pleasant 69 year old year old, , female, who presents today for further evaluation recommendations regarding urinary incontinence.  Patient underwent SPARC in .  Urodynamic study in  noted massive stress incontinence and she also had a cystocele.  Patient underwent a TVH and BSO with urethral sling in  in [place].  She had findings of possible rectocele in .    Patient was given a trial of topical estrogen.  She did note that she tried this for approximately 2 weeks, and made urinary symptoms worse and gave her vaginal itching.    Patient does have a significant incontinence.  She will leak through her close.  She is going through approximately 12 pads per day.  She is urinating every 30 minutes to an hour.  She also endorses a history of urinary tract infections.  Patient denies any hematuria, dysuria, or nephrolithiasis.  She endorses issues with urgency and frequency.  She does have a history of chronic constipation and it is still hard for her to poop.  She uses Metamucil regularly.  Patient endorses urge incontinence, but denies significant stress incontinence.  She has nocturia every 2 hours.  She does restrict fluids prior to bedtime.  After her surgery in [place] in 2023, she did note that her urinary control was good for about a year.    She does have to drink quite a bit of water due to her medication for trigeminal neuralgia.  She previously trialed Myrbetriq which caused concerns with her blood pressure.  She has  previously done pelvic floor physical therapy.  She is also previously trialed oxybutynin and Detrol without significant improvement.  Also appears that she trialed Vesicare back in 2012 with Dr. Molina.    Urinalysis does show blood today.  She denies taking any blood thinning medications.  Denies jobs with significant chemical exposure.      She is concerned about some of the medications due to already having issues with mental fogginess.    The following portions of the patient's history were reviewed and updated as appropriate: allergies, current medications, past social history, past surgical history, and problem list.     REVIEW OF SYSTEMS   Review of Systems   Constitutional:  Negative for chills and fever.   Respiratory:  Positive for cough. Negative for shortness of breath.    Cardiovascular:  Negative for chest pain.   Gastrointestinal:  Positive for constipation. Negative for nausea and vomiting.   Genitourinary:  Positive for frequency and urgency. Negative for dysuria and hematuria.      Per HPI.     Patient Active Problem List   Diagnosis     Actinic keratosis     Hallux rigidus     Hypercholesterolemia     Mitral valve stenosis, mild     Urinary frequency     DDD (degenerative disc disease), cervical     Seasonal allergic rhinitis     Trigeminal neuralgia     Complete tear of right rotator cuff     Benign essential hypertension     Cystocele, midline     Mixed stress and urge urinary incontinence     Intrinsic sphincter deficiency      Past Medical History:   Diagnosis Date     Allergic rhinitis      HTN (hypertension)      Hyperlipidemia 08/06/2008     Trigeminal neuralgia       Past Surgical History:   Procedure Laterality Date     BLADDER SURGERY       C section with Tubal ligation       CYSTOSCOPY       HYSTERECTOMY VAGINAL  04/26/2023    with bladder repair     LAPAROSCOPIC SUSPENSION BLADDER NECK  2010    SPARC      Social History:   .  Never smoker.     Family History:   Family  "history of nephrolithiasis in her brother.  No known family history of  malignancy.     Objective     PHYSICAL EXAM   /82   Ht 1.56 m (5' 1.42\")   Wt 64.4 kg (142 lb)   LMP 10/22/2008 (Exact Date)   BMI 26.46 kg/m     Physical Exam  Constitutional:       Appearance: Normal appearance.   HENT:      Head: Normocephalic.      Nose: Nose normal.   Eyes:      General: No scleral icterus.  Pulmonary:      Effort: Pulmonary effort is normal.   Abdominal:      General: There is no distension.   Musculoskeletal:         General: Normal range of motion.      Cervical back: Normal range of motion.   Skin:     General: Skin is warm and dry.   Neurological:      Mental Status: She is alert and oriented to person, place, and time.      Motor: Tremor present.   Psychiatric:         Mood and Affect: Mood normal.         Behavior: Behavior normal.        LABORATORY   Recent Labs   Lab Test 08/29/24  1404 06/05/24  1451 05/09/24  1321   COLOR Yellow   < > Yellow   APPEARANCE Clear   < > Clear   URINEGLC Negative   < > Negative   URINEBILI Negative   < > Negative   URINEKETONE Negative   < > Negative   SG 1.020   < > 1.010   UBLD Trace*   < > Small*   URINEPH 7.0   < > 6.5   PROTEIN Negative   < > Negative   UROBILINOGEN 0.2   < > 0.2   NITRITE Negative   < > Negative   LEUKEST Trace*   < > Large*   RBCU  --   --  2-5*   WBCU  --   --  *    < > = values in this interval not displayed.     TESTING    PVR: 21 mL    Assessment & Plan   1. Urge incontinence of urine    2. Microscopic hematuria    3. UTI symptoms        I had the pleasure today of meeting with Ms. Taylor to discuss urinary incontinence.  Patient has had 2 previous procedures for stress incontinence.  She denies significant issues with stress incontinence at this time.  Her main concern is issues with urgency, frequency, and urge incontinence.  She has previously trialed pelvic floor physical therapy as well as several other medications.  He has been having " issues with mental fogginess.  Therefore, would be concerned about trial of an anticholinergic other than trospium.    We discussed other overactive bladder treatment options including a beta 3 agonist, posterior tibial nerve stimulation, Botox, and implantable device like Axonics or InterStim.    Patient also has findings concerning for possible blood in the urine.  If she does have microscopic hematuria, we did discuss that microscopic hematuria evaluation would be recommended including CT urogram and cystoscopy.  Urinalysis is questionable for possible urinary tract infection.  Would be reasonable to look for possible infection, as patient is concerned about worsening of her mental fogginess with additional medication options.    She is uncertain of how she would like to proceed.    Will plan on the following:    -Will send urine for microscopy.  If positive for microscopic blood, would recommend CT Urogram and cystoscopy.    -Will send urine for culture.  If positive, will treat with culture specific antibiotics.    -Other options for urgency, frequency, and urge incontinence would be Botox, posterior tibial nerve stimulation, or implantable device like InterStim or Axonics.    -If you are interested in any of these, let me know.  If you would like to do Botox, would need to talk to Dr. Corona in [place].  Posterior tibial nerve stimulation is in [place] too.  Could trial another medication.  Would only consider Gemtesa or trospium given your mental fogginess.    -She will let me know how she would like to proceed.    -Could consider cystoscopy and pelvic at the same time and urodynamics for further testing/evaluation in the future.    Contact us in the interim with questions, concerns, or changes in symptomatology.    Signed by:     Vonnie Couch PA-C 8/29/2024 2:22 PM        Again, thank you for allowing me to participate in the care of your patient.      Sincerely,    Vonnie Couch PA-C

## 2024-08-30 LAB — BACTERIA UR CULT: NORMAL

## 2024-09-06 ENCOUNTER — TELEPHONE (OUTPATIENT)
Dept: UROLOGY | Facility: CLINIC | Age: 69
End: 2024-09-06
Payer: COMMERCIAL

## 2024-09-06 NOTE — TELEPHONE ENCOUNTER
Health Call Center    Phone Message    May a detailed message be left on voicemail: yes     Reason for Call: Patient is calling saying she would not like to have any procedure or implants Vonnie Couch offered. Patient did state she is willing to trial another medication to tighten her bladder. Please reach out to patient to discuss care plan. Thanks!    Action Taken: Other: UB- Urology    Travel Screening: Not Applicable     Date of Service:

## 2024-09-16 DIAGNOSIS — I10 BENIGN ESSENTIAL HYPERTENSION: ICD-10-CM

## 2024-09-16 DIAGNOSIS — G50.0 TRIGEMINAL NEURALGIA: ICD-10-CM

## 2024-09-16 RX ORDER — AMLODIPINE AND BENAZEPRIL HYDROCHLORIDE 10; 40 MG/1; MG/1
CAPSULE ORAL
Qty: 90 CAPSULE | Refills: 2 | Status: SHIPPED | OUTPATIENT
Start: 2024-09-16

## 2024-09-16 RX ORDER — GABAPENTIN 300 MG/1
CAPSULE ORAL
Qty: 180 CAPSULE | Refills: 0 | Status: SHIPPED | OUTPATIENT
Start: 2024-09-16

## 2024-09-16 RX ORDER — CHLORTHALIDONE 25 MG/1
12.5 TABLET ORAL DAILY
Qty: 45 TABLET | Refills: 2 | Status: SHIPPED | OUTPATIENT
Start: 2024-09-16

## 2024-09-30 ASSESSMENT — ENCOUNTER SYMPTOMS
FEVER: 0
SHORTNESS OF BREATH: 0
NAUSEA: 0
DYSURIA: 0
HEMATURIA: 0
COUGH: 1
VOMITING: 0
FREQUENCY: 1
CHILLS: 0
CONSTIPATION: 1

## 2024-10-17 ENCOUNTER — OFFICE VISIT (OUTPATIENT)
Dept: PODIATRY | Facility: CLINIC | Age: 69
End: 2024-10-17
Payer: COMMERCIAL

## 2024-10-17 VITALS — WEIGHT: 142 LBS | DIASTOLIC BLOOD PRESSURE: 78 MMHG | BODY MASS INDEX: 26.46 KG/M2 | SYSTOLIC BLOOD PRESSURE: 118 MMHG

## 2024-10-17 DIAGNOSIS — M77.41 METATARSALGIA, RIGHT FOOT: ICD-10-CM

## 2024-10-17 DIAGNOSIS — M21.42 FLAT FEET, BILATERAL: ICD-10-CM

## 2024-10-17 DIAGNOSIS — M21.41 FLAT FEET, BILATERAL: ICD-10-CM

## 2024-10-17 DIAGNOSIS — D17.20 LIPOMA OF FOOT: Primary | ICD-10-CM

## 2024-10-17 PROCEDURE — 99213 OFFICE O/P EST LOW 20 MIN: CPT | Performed by: PODIATRIST

## 2024-10-17 NOTE — PATIENT INSTRUCTIONS
Thank you for choosing [facility] Podiatry / Foot & Ankle Surgery!    DR. ARSHAD'S CLINIC LOCATIONS:     [facility] TRIAGE LINE: [phone]   [address] APPOINTMENTS: [phone]   [place] [address] RADIOLOGY: [phone]   (Every other Tues - Wed - Fri PM) SET UP SURGERY: [phone]    PHYSICAL THERAPY: [phone]   [place] SPECIAL[facility] BILLING QUESTIONS: [phone] [address] FAX: [phone]   [place] [address]    (Thurs & Fri AM)         CAPSULITIS / METATARSALGIA  All joints in the body are surrounded by a capsule, or a covering of soft tissue and ligaments. The capsule holds bones together and secretes joint fluid to help lubricate the joint. If a joint capsule is exposed to excessive force, it can develop microscopic tears and become inflamed. This commonly occurs in the foot due to mild variation in anatomy. Hammertoes, bunions, irregular bone length, joint immobility, etc. can all lead to excessive force on the joint. Capsule injury can also occur due to repetitive stress from exercise, insufficient support from shoes, excessive bare foot walking and excessive weight.      Conservative treatments include ice, rest from the aggravating activity, weight loss, orthotic inserts, improving shoes and shoe modifications. Appropriate shoes will protect the inflamed tissue improving the chances of healing. Avoidance of standing or walking barefoot, including around the house, is necessary to allow healing. Casts are sometimes used for more aggressive protection.  NSAIDs such as Advil are also used to help with pain and decreasing inflammation. If pain continues over a period of weeks with continuous rest and icing, Corticosteroid injections can be a treatment option to try and help decrease inflammation.    Surgery is often necessary to correct the underlying structural problem. Surgery might include shortening an excessively long bone, repairing bunion or  hammertoe, lengthening a tight Achilles  tendon, etc. These are same day surgeries that might be pursued if more conservative measures fail to provide relief.      The inflamed joint capsule has the potential to completely tear. This will allow the toe to drift off the ground, curving toward the other toes. The involved toe may under or overlap the adjacent toes as drift continues. The pain may improve after the joint tears or this new position will be permanent. Surgery can address the toe alignment. Your goal of treating capsulitis is to avoid this scenario.           [facility] LOCATIONS  [facility]  [address]  [place] [address]  Phone: [phone]  Fax: [phone] [facility]   [address]  [place] [address]  Phone: [phone]  Fax: [phone]   [facility]  [address]  [place] [address]  Phone: [phone]  Fax: [phone] [facility]  [address]  [place] [address]  Phone: [phone]   Fax: [phone]   * Please call any location listed to make an appointment for a casting/fitting. Your referral was sent to their central office and they will all have the order on file.

## 2024-10-17 NOTE — LETTER
"10/17/2024      Nu Taylor  [address] [facility] [address]      Dear Colleague,    Thank you for referring your patient, Nu Taylor, to the [facility] PODIATRY. Please see a copy of my visit note below.    ASSESSMENT:  Encounter Diagnoses   Name Primary?     Lipoma of foot Yes     Metatarsalgia, right foot      Flat feet, bilateral      MEDICAL DECISION MAKING:  The lesion is essentially unchanged in size and not causing her discomfort that limits her ability to enjoy her life or her weightbearing activities.    No indication for surgical intervention.    I reviewed ways to offload the plantar forefoot, similar to recommendations made for metatarsalgia.  We specifically discussed stiffer soled shoes, good arch support, metatarsal padding, gentle calf stretching.    Details were outlined in her after visit summary  She is referred for custom orthoses.  Will request metatarsal padding.    Follow-up on an as-needed basis.    Disclaimer: This note consists of symbols derived from keyboarding, dictation and/or voice recognition software. As a result, there may be errors in the script that have gone undetected. Please consider this when interpreting information found in this chart.    Casper Yang, SABRA, FACFAS, MS    [place] [facility] of Podiatry/Foot & Ankle Surgery      ____________________________________________________________________    HPI:       Nu follows up for a soft tissue mass plantar right forefoot.    I last evaluated her 6/27/2024.    There was an MRI done on 5/23/2024.  Findings were most consistent with a lipoma.  Due to it saying \"difficult to exclude a low-grade liposarcoma \" Nu was referred to orthopedic oncology for evaluation.  She saw Dr. Liz.   He reached out and did not have concern for any sort of malignant neoplasm.  I explained that I would help assist offloading this area and certainly could excise any mass, if becoming more " painful.  Nu reports having some discomfort yet denies any norberto pain.  She does not think the lesion has changed.    Past Medical History:   Diagnosis Date     Allergic rhinitis      HTN (hypertension)      Hyperlipidemia 08/06/2008     Trigeminal neuralgia    *  *  Past Surgical History:   Procedure Laterality Date     BLADDER SURGERY       C section with Tubal ligation       CYSTOSCOPY       HYSTERECTOMY VAGINAL  04/26/2023    with bladder repair     LAPAROSCOPIC SUSPENSION BLADDER NECK  2010    [facility]   *  *  Current Outpatient Medications   Medication Sig Dispense Refill     amLODIPine-benazepril (LOTREL) 10-40 MG capsule Take 1 capsule by mouth once daily 90 capsule 2     atorvastatin (LIPITOR) 20 MG tablet Take 1 tablet (20 mg) by mouth daily. 90 tablet 0     carBAMazepine (TEGRETOL) 200 MG tablet TAKE 3 TABLETS(600 MG) BY MOUTH TWICE DAILY 540 tablet 1     chlorthalidone (HYGROTON) 25 MG tablet Take 0.5 tablets (12.5 mg) by mouth daily. 45 tablet 2     gabapentin (NEURONTIN) 300 MG capsule TAKE 1 CAPSULE(300 MG) BY MOUTH TWO TIMES DAILY 180 capsule 0     Multiple Vitamins-Minerals (CENTRUM PO)        benzonatate (TESSALON) 200 MG capsule Take 1 capsule (200 mg) by mouth 3 times daily as needed (Patient not taking: Reported on 8/29/2024) 30 capsule 0     estradiol (ESTRACE) 0.1 MG/GM vaginal cream Place 2 g vaginally twice a week (Patient not taking: Reported on 8/29/2024) 70 g 3         EXAM:    Vitals: /78   Wt 64.4 kg (142 lb)   LMP 10/22/2008 (Exact Date)   BMI 26.46 kg/m    BMI: Body mass index is 26.46 kg/m .    Vascular:  Pedal pulses are palpable for both the DP and PT arteries.  CFT < 3 sec.  No edema.       Neuro: Light touch sensation is intact to the L4, L5, S1 distributions  No evidence of weakness, spasticity, or contracture in the lower extremities.      Derm: Normal texture and turgor.  No erythema, ecchymosis, or cyanosis.  No open lesions.      Musculoskeletal:    Lower  extremity muscle strength is normal. No gross deformities.  On palpatory exam, the plantar aspect of the third metatarsal head is more palpable than neighboring metatarsals.  There is some mild soft tissue thickening.     Decrease in medial longitudinal arch, bilateral, with weightbearing.     5/23/24 MRI Right Foot:  Impression:     1. In proximity to patient marker plantar to the second-third proximal  phalanges there is a fat intensity, somewhat circumscribed lesion  measuring approximately 1.3 x 1.1 x 2.0 cm most compatible with a  subcutaneous lipoma. Given progression in size and discomfort it is  difficult to exclude a low-grade liposarcoma. Consider orthopedic  oncology referral to [facility].     2. Polyarticular degenerative change, moderate severe at the first  metatarsophalangeal joint.           [Consider Follow Up: Fat intensity mass, low-grade liposarcoma not  excluded]     This report will be copied to the [facility] to ensure a  provider acknowledges the finding. Access Center is available Monday  through Friday 8am-3:30 pm.     FAHAD WADE MD (Joe)         Again, thank you for allowing me to participate in the care of your patient.        Sincerely,        Casper Yang DPM

## 2024-10-17 NOTE — PROGRESS NOTES
"ASSESSMENT:  Encounter Diagnoses   Name Primary?    Lipoma of foot Yes    Metatarsalgia, right foot     Flat feet, bilateral      MEDICAL DECISION MAKING:  The lesion is essentially unchanged in size and not causing her discomfort that limits her ability to enjoy her life or her weightbearing activities.    No indication for surgical intervention.    I reviewed ways to offload the plantar forefoot, similar to recommendations made for metatarsalgia.  We specifically discussed stiffer soled shoes, good arch support, metatarsal padding, gentle calf stretching.    Details were outlined in her after visit summary  She is referred for custom orthoses.  Will request metatarsal padding.    Follow-up on an as-needed basis.    Disclaimer: This note consists of symbols derived from keyboarding, dictation and/or voice recognition software. As a result, there may be errors in the script that have gone undetected. Please consider this when interpreting information found in this chart.    Casper Yang DPM, FACFAS, MS    [place] Department of Podiatry/Foot & Ankle Surgery      ____________________________________________________________________    HPI:       Nu follows up for a soft tissue mass plantar right forefoot.    I last evaluated her 6/27/2024.    There was an MRI done on 5/23/2024.  Findings were most consistent with a lipoma.  Due to it saying \"difficult to exclude a low-grade liposarcoma \" Nu was referred to orthopedic oncology for evaluation.  She saw Dr. Liz.   He reached out and did not have concern for any sort of malignant neoplasm.  I explained that I would help assist offloading this area and certainly could excise any mass, if becoming more painful.  Nu reports having some discomfort yet denies any norberto pain.  She does not think the lesion has changed.    Past Medical History:   Diagnosis Date    Allergic rhinitis     HTN (hypertension)     Hyperlipidemia 08/06/2008    Trigeminal neuralgia  "   *  *  Past Surgical History:   Procedure Laterality Date    BLADDER SURGERY      C section with Tubal ligation      CYSTOSCOPY      HYSTERECTOMY VAGINAL  04/26/2023    with bladder repair    LAPAROSCOPIC SUSPENSION BLADDER NECK  2010    [facility]   *  *  Current Outpatient Medications   Medication Sig Dispense Refill    amLODIPine-benazepril (LOTREL) 10-40 MG capsule Take 1 capsule by mouth once daily 90 capsule 2    atorvastatin (LIPITOR) 20 MG tablet Take 1 tablet (20 mg) by mouth daily. 90 tablet 0    carBAMazepine (TEGRETOL) 200 MG tablet TAKE 3 TABLETS(600 MG) BY MOUTH TWICE DAILY 540 tablet 1    chlorthalidone (HYGROTON) 25 MG tablet Take 0.5 tablets (12.5 mg) by mouth daily. 45 tablet 2    gabapentin (NEURONTIN) 300 MG capsule TAKE 1 CAPSULE(300 MG) BY MOUTH TWO TIMES DAILY 180 capsule 0    Multiple Vitamins-Minerals (CENTRUM PO)       benzonatate (TESSALON) 200 MG capsule Take 1 capsule (200 mg) by mouth 3 times daily as needed (Patient not taking: Reported on 8/29/2024) 30 capsule 0    estradiol (ESTRACE) 0.1 MG/GM vaginal cream Place 2 g vaginally twice a week (Patient not taking: Reported on 8/29/2024) 70 g 3         EXAM:    Vitals: /78   Wt 64.4 kg (142 lb)   LMP 10/22/2008 (Exact Date)   BMI 26.46 kg/m    BMI: Body mass index is 26.46 kg/m .    Vascular:  Pedal pulses are palpable for both the DP and PT arteries.  CFT < 3 sec.  No edema.       Neuro: Light touch sensation is intact to the L4, L5, S1 distributions  No evidence of weakness, spasticity, or contracture in the lower extremities.      Derm: Normal texture and turgor.  No erythema, ecchymosis, or cyanosis.  No open lesions.      Musculoskeletal:    Lower extremity muscle strength is normal. No gross deformities.  On palpatory exam, the plantar aspect of the third metatarsal head is more palpable than neighboring metatarsals.  There is some mild soft tissue thickening.     Decrease in medial longitudinal arch, bilateral, with  weightbearing.     5/23/24 MRI Right Foot:  Impression:     1. In proximity to patient marker plantar to the second-third proximal  phalanges there is a fat intensity, somewhat circumscribed lesion  measuring approximately 1.3 x 1.1 x 2.0 cm most compatible with a  subcutaneous lipoma. Given progression in size and discomfort it is  difficult to exclude a low-grade liposarcoma. Consider orthopedic  oncology referral to [facility].     2. Polyarticular degenerative change, moderate severe at the first  metatarsophalangeal joint.           [Consider Follow Up: Fat intensity mass, low-grade liposarcoma not  excluded]     This report will be copied to the [facility] to ensure a  provider acknowledges the finding. Access Center is available Monday  through Friday 8am-3:30 pm.     FAHAD WADE MD (Joe)

## 2024-10-21 ENCOUNTER — OFFICE VISIT (OUTPATIENT)
Dept: FAMILY MEDICINE | Facility: CLINIC | Age: 69
End: 2024-10-21
Payer: COMMERCIAL

## 2024-10-21 VITALS
TEMPERATURE: 98.2 F | SYSTOLIC BLOOD PRESSURE: 112 MMHG | BODY MASS INDEX: 25.98 KG/M2 | OXYGEN SATURATION: 97 % | HEART RATE: 98 BPM | HEIGHT: 61 IN | DIASTOLIC BLOOD PRESSURE: 74 MMHG | WEIGHT: 137.6 LBS | RESPIRATION RATE: 18 BRPM

## 2024-10-21 DIAGNOSIS — E78.2 MIXED HYPERLIPIDEMIA: ICD-10-CM

## 2024-10-21 DIAGNOSIS — Z29.11 NEED FOR VACCINATION AGAINST RESPIRATORY SYNCYTIAL VIRUS: ICD-10-CM

## 2024-10-21 DIAGNOSIS — L72.3 SEBACEOUS CYST: ICD-10-CM

## 2024-10-21 DIAGNOSIS — Z13.220 SCREENING FOR HYPERLIPIDEMIA: ICD-10-CM

## 2024-10-21 DIAGNOSIS — H61.23 BILATERAL IMPACTED CERUMEN: ICD-10-CM

## 2024-10-21 DIAGNOSIS — Z00.00 ENCOUNTER FOR MEDICARE ANNUAL WELLNESS EXAM: Primary | ICD-10-CM

## 2024-10-21 DIAGNOSIS — R73.9 ELEVATED SERUM GLUCOSE: ICD-10-CM

## 2024-10-21 DIAGNOSIS — I10 BENIGN ESSENTIAL HYPERTENSION: ICD-10-CM

## 2024-10-21 DIAGNOSIS — G50.0 TRIGEMINAL NEURALGIA: ICD-10-CM

## 2024-10-21 LAB
EST. AVERAGE GLUCOSE BLD GHB EST-MCNC: 103 MG/DL
HBA1C MFR BLD: 5.2 % (ref 0–5.6)

## 2024-10-21 PROCEDURE — G0008 ADMIN INFLUENZA VIRUS VAC: HCPCS | Performed by: FAMILY MEDICINE

## 2024-10-21 PROCEDURE — G0439 PPPS, SUBSEQ VISIT: HCPCS | Performed by: FAMILY MEDICINE

## 2024-10-21 PROCEDURE — 80048 BASIC METABOLIC PNL TOTAL CA: CPT | Performed by: FAMILY MEDICINE

## 2024-10-21 PROCEDURE — 36415 COLL VENOUS BLD VENIPUNCTURE: CPT | Performed by: FAMILY MEDICINE

## 2024-10-21 PROCEDURE — 69209 REMOVE IMPACTED EAR WAX UNI: CPT | Mod: 50 | Performed by: FAMILY MEDICINE

## 2024-10-21 PROCEDURE — 90662 IIV NO PRSV INCREASED AG IM: CPT | Performed by: FAMILY MEDICINE

## 2024-10-21 PROCEDURE — 80061 LIPID PANEL: CPT | Performed by: FAMILY MEDICINE

## 2024-10-21 PROCEDURE — 83036 HEMOGLOBIN GLYCOSYLATED A1C: CPT | Performed by: FAMILY MEDICINE

## 2024-10-21 RX ORDER — ATORVASTATIN CALCIUM 20 MG/1
20 TABLET, FILM COATED ORAL DAILY
Qty: 90 TABLET | Refills: 4 | Status: SHIPPED | OUTPATIENT
Start: 2024-10-21

## 2024-10-21 RX ORDER — CHLORTHALIDONE 25 MG/1
12.5 TABLET ORAL DAILY
Qty: 45 TABLET | Refills: 4 | Status: SHIPPED | OUTPATIENT
Start: 2024-10-21

## 2024-10-21 RX ORDER — CARBAMAZEPINE 200 MG/1
TABLET ORAL
Qty: 540 TABLET | Refills: 1 | Status: SHIPPED | OUTPATIENT
Start: 2024-10-21

## 2024-10-21 RX ORDER — AMLODIPINE AND BENAZEPRIL HYDROCHLORIDE 10; 40 MG/1; MG/1
CAPSULE ORAL
Qty: 90 CAPSULE | Refills: 4 | Status: SHIPPED | OUTPATIENT
Start: 2024-10-21

## 2024-10-21 SDOH — HEALTH STABILITY: PHYSICAL HEALTH: ON AVERAGE, HOW MANY DAYS PER WEEK DO YOU ENGAGE IN MODERATE TO STRENUOUS EXERCISE (LIKE A BRISK WALK)?: 5 DAYS

## 2024-10-21 ASSESSMENT — SOCIAL DETERMINANTS OF HEALTH (SDOH): HOW OFTEN DO YOU GET TOGETHER WITH FRIENDS OR RELATIVES?: MORE THAN THREE TIMES A WEEK

## 2024-10-21 NOTE — PATIENT INSTRUCTIONS
Patient Education   Preventive Care Advice   This is general advice given by our system to help you stay healthy. However, your care team may have specific advice just for you. Please talk to your care team about your preventive care needs.  Nutrition  Eat 5 or more servings of fruits and vegetables each day.  Try wheat bread, brown rice and whole grain pasta (instead of white bread, rice, and pasta).  Get enough calcium and vitamin D. Check the label on foods and aim for 100% of the RDA (recommended daily allowance).  Lifestyle  Exercise at least 150 minutes each week  (30 minutes a day, 5 days a week).  Do muscle strengthening activities 2 days a week. These help control your weight and prevent disease.  No smoking.  Wear sunscreen to prevent skin cancer.  Have a dental exam and cleaning every 6 months.  Yearly exams  See your health care team every year to talk about:  Any changes in your health.  Any medicines your care team has prescribed.  Preventive care, family planning, and ways to prevent chronic diseases.  Shots (vaccines)   HPV shots (up to age 26), if you've never had them before.  Hepatitis B shots (up to age 59), if you've never had them before.  COVID-19 shot: Get this shot when it's due.  Flu shot: Get a flu shot every year.  Tetanus shot: Get a tetanus shot every 10 years.  Pneumococcal, hepatitis A, and RSV shots: Ask your care team if you need these based on your risk.  Shingles shot (for age 50 and up)  General health tests  Diabetes screening:  Starting at age 35, Get screened for diabetes at least every 3 years.  If you are younger than age 35, ask your care team if you should be screened for diabetes.  Cholesterol test: At age 39, start having a cholesterol test every 5 years, or more often if advised.  Bone density scan (DEXA): At age 50, ask your care team if you should have this scan for osteoporosis (brittle bones).  Hepatitis C: Get tested at least once in your life.  STIs (sexually  transmitted infections)  Before age 24: Ask your care team if you should be screened for STIs.  After age 24: Get screened for STIs if you're at risk. You are at risk for STIs (including HIV) if:  You are sexually active with more than one person.  You don't use condoms every time.  You or a partner was diagnosed with a sexually transmitted infection.  If you are at risk for HIV, ask about PrEP medicine to prevent HIV.  Get tested for HIV at least once in your life, whether you are at risk for HIV or not.  Cancer screening tests  Cervical cancer screening: If you have a cervix, begin getting regular cervical cancer screening tests starting at age 21.  Breast cancer scan (mammogram): If you've ever had breasts, begin having regular mammograms starting at age 40. This is a scan to check for breast cancer.  Colon cancer screening: It is important to start screening for colon cancer at age 45.  Have a colonoscopy test every 10 years (or more often if you're at risk) Or, ask your provider about stool tests like a FIT test every year or Cologuard test every 3 years.  To learn more about your testing options, visit:   .  For help making a decision, visit:   https://bit.ly/yf20355.  Prostate cancer screening test: If you have a prostate, ask your care team if a prostate cancer screening test (PSA) at age 55 is right for you.  Lung cancer screening: If you are a current or former smoker ages 50 to 80, ask your care team if ongoing lung cancer screenings are right for you.  For informational purposes only. Not to replace the advice of your health care provider. Copyright   2023 [employer]. All rights reserved. Clinically reviewed by the [facility] Transitions Program. [employer] [id number] - REV 01/24.

## 2024-10-21 NOTE — PROGRESS NOTES
"Preventive Care Visit  [facility] DO Aliya, Family Medicine  Oct 21, 2024      Assessment & Plan   See after visit summary and result note for helpful information and advice given to patient.    Trigeminal neuralgia    - carBAMazepine (TEGRETOL) 200 MG tablet  Dispense: 540 tablet; Refill: 1    Benign essential hypertension    - chlorthalidone (HYGROTON) 25 MG tablet  Dispense: 45 tablet; Refill: 4  - amLODIPine-benazepril (LOTREL) 10-40 MG capsule  Dispense: 90 capsule; Refill: 4  - Basic metabolic panel    Mixed hyperlipidemia    - atorvastatin (LIPITOR) 20 MG tablet  Dispense: 90 tablet; Refill: 4    Screening for hyperlipidemia    - Lipid Profile    Elevated serum glucose    - Hemoglobin A1c    Sebaceous cyst    Encounter for Medicare annual wellness exam    Bilateral impacted cerumen    - NE REMOVAL IMPACTED CERUMEN IRRIGATION/LVG UNILAT            BMI  Estimated body mass index is 26 kg/m  as calculated from the following:    Height as of this encounter: 1.549 m (5' 1\").    Weight as of this encounter: 62.4 kg (137 lb 9.6 oz).       Counseling  Appropriate preventive services were addressed with this patient via screening, questionnaire, or discussion as appropriate for fall prevention, nutrition, physical activity, Tobacco-use cessation, social engagement, weight loss and cognition.  Checklist reviewing preventive services available has been given to the patient.  Reviewed patient's diet, addressing concerns and/or questions.   Information on urinary incontinence and treatment options given to patient.           Enrique Judge is a 69 year old, presenting for the following:  Wellness Visit        10/21/2024     1:39 PM   Additional Questions   Roomed by Fina         Health Care Directive  Patient does not have a Health Care Directive or Living Will: Discussed advance care planning with patient; information given to patient to review.    HPI  Patient is seen for primary " reason of preventive health visit.            10/21/2024   General Health   How would you rate your overall physical health? Good   Feel stress (tense, anxious, or unable to sleep) Not at all            10/21/2024   Nutrition   Diet: Regular (no restrictions)    I don't know       Multiple values from one day are sorted in reverse-chronological order         10/21/2024   Exercise   Days per week of moderate/strenous exercise 5 days            10/21/2024   Social Factors   Frequency of gathering with friends or relatives More than three times a week   Worry food won't last until get money to buy more No   Food not last or not have enough money for food? No   Do you have housing? (Housing is defined as stable permanent housing and does not include staying ouside in a car, in a tent, in an abandoned building, in an overnight shelter, or couch-surfing.) Yes   Are you worried about losing your housing? No   Lack of transportation? No   Unable to get utilities (heat,electricity)? No            10/21/2024   Fall Risk   Fallen 2 or more times in the past year? No   Trouble with walking or balance? No             10/21/2024   Activities of Daily Living- Home Safety   Needs help with the following daily activites None of the above   Safety concerns in the home None of the above            10/21/2024   Dental   Dentist two times every year? Yes            10/21/2024   Hearing Screening   Hearing concerns? None of the above            10/21/2024   Driving Risk Screening   Patient/family members have concerns about driving No            10/21/2024   General Alertness/Fatigue Screening   Have you been more tired than usual lately? No            10/21/2024   Urinary Incontinence Screening   Bothered by leaking urine in past 6 months Yes            10/21/2024   TB Screening   Were you born outside of the [place]? Yes            Today's PHQ-2 Score:       10/21/2024     1:33 PM   PHQ-2 ( 1999 [employer])   Q1: Little interest or pleasure in  doing things 0   Q2: Feeling down, depressed or hopeless 0   PHQ-2 Score 0   Q1: Little interest or pleasure in doing things Not at all   Q2: Feeling down, depressed or hopeless Not at all   PHQ-2 Score 0           10/21/2024   Substance Use   Alcohol more than 3/day or more than 7/wk No   Do you have a current opioid prescription? No   How severe/bad is pain from 1 to 10? 0/10 (No Pain)   Do you use any other substances recreationally? No        Social History     Tobacco Use    Smoking status: Never    Smokeless tobacco: Never   Vaping Use    Vaping status: Never Used   Substance Use Topics    Alcohol use: Yes     Alcohol/week: 0.0 standard drinks of alcohol     Comment: occ    Drug use: No           8/10/2024   LAST FHS-7 RESULTS   1st degree relative breast or ovarian cancer Yes   Any relative bilateral breast cancer No   Any male have breast cancer No   Any ONE woman have BOTH breast AND ovarian cancer No   Any woman with breast cancer before 50yrs No   2 or more relatives with breast AND/OR ovarian cancer No   2 or more relatives with breast AND/OR bowel cancer No           Mammogram Screening - Mammogram every 1-2 years updated in Health Maintenance based on mutual decision making      History of abnormal Pap smear: No - age 65 or older with adequate negative prior screening test results (3 consecutive negative cytology results, 2 consecutive negative cotesting results, or 2 consecutive negative HrHPV test results within 10 years, with the most recent test occurring within the recommended screening interval for the test used)        Latest Ref Rng & Units 8/27/2018     8:08 AM 8/27/2018     7:30 AM 4/3/2015     7:46 AM   PAP / HPV   PAP (Historical)  NIL      HPV 16 DNA NEG^Negative  Negative  Negative    HPV 18 DNA NEG^Negative  Negative  Negative    Other HR HPV NEG^Negative  Negative  Negative      ASCVD Risk   The 10-year ASCVD risk score (Frances GONZALES, et al., 2019) is: 7.4%    Values used to  calculate the score:      Age: 69 years      Sex: Female      Is Non- : No      Diabetic: No      Tobacco smoker: No      Systolic Blood Pressure: 112 mmHg      Is BP treated: Yes      HDL Cholesterol: 74 mg/dL      Total Cholesterol: 211 mg/dL            Reviewed and updated as needed this visit by Provider     Meds                  Current providers sharing in care for this patient include:  Patient Care Team:  Andrés Pisano DO as PCP - General (Family Medicine)  Soto Fish MD as Physician (OB/Gyn)  Andrés Pisano DO as Assigned PCP  Casper Yang DPM as Assigned Surgical Provider  Starr Flores DO as Physician (OB/Gyn)  Vonnie Couch PA-C as Physician Assistant (Urology)  Starr Flores DO as Referring Physician (OB/Gyn)  Stephon Liz MD as Assigned Musculoskeletal Provider  Starr Flores DO as Assigned OBGYN Provider    The following health maintenance items are reviewed in Epic and correct as of today:  Health Maintenance   Topic Date Due    RSV VACCINE (1 - Risk 60-74 years 1-dose series) Never done    Pneumococcal Vaccine: 65+ Years (2 of 2 - PCV) 09/07/2022    INFLUENZA VACCINE (1) 09/01/2024    COVID-19 Vaccine (5 - 2024-25 season) 09/01/2024    LIPID  12/06/2024    DTAP/TDAP/TD IMMUNIZATION (2 - Td or Tdap) 04/03/2025    BMP  07/12/2025    MEDICARE ANNUAL WELLNESS VISIT  10/21/2025    ANNUAL REVIEW OF HM ORDERS  10/21/2025    FALL RISK ASSESSMENT  10/21/2025    COLORECTAL CANCER SCREENING  10/30/2025    MAMMO SCREENING  08/10/2026    GLUCOSE  07/12/2027    ADVANCE CARE PLANNING  10/24/2028    DEXA  05/26/2030    HEPATITIS C SCREENING  Completed    PHQ-2 (once per calendar year)  Completed    ZOSTER IMMUNIZATION  Completed    HPV IMMUNIZATION  Aged Out    MENINGITIS IMMUNIZATION  Aged Out    RSV MONOCLONAL ANTIBODY  Aged Out    PAP  Discontinued         Review of Systems  Constitutional, neuro, ENT, endocrine,  "pulmonary, cardiac, gastrointestinal, genitourinary, musculoskeletal, integument and psychiatric systems are negative, except as otherwise noted.    2 weeks ago she felt a tender mass over left upper lumbar area, which leaked fluid with a strong odor.  The area feels better now.        Objective    Exam  /74   Pulse 98   Temp 98.2  F (36.8  C) (Tympanic)   Resp 18   Ht 1.549 m (5' 1\")   Wt 62.4 kg (137 lb 9.6 oz)   LMP 10/22/2008 (Exact Date)   SpO2 97%   BMI 26.00 kg/m     Estimated body mass index is 26 kg/m  as calculated from the following:    Height as of this encounter: 1.549 m (5' 1\").    Weight as of this encounter: 62.4 kg (137 lb 9.6 oz).    Physical Exam  General: Vital signs reviewed.  Patient is in no acute appearing distress.  Breathing appears nonlabored.  Patient is alert and oriented ×3.      ENT: Ear exam shows bilateral tympanic membranes to not be visible due to ear canal occlusion from cerumen, nasal turbinates show no injection or edema, no pharyngeal injection or exudate.  Patient was agreeable with having an ear wash done this visit to clear cerumen.    Neck: supple with no adenoapthy, palpable abnormal masses, or thyroid abnormality.    Eyes: No scleral, lid, or periorbital injection or edema noted.  No eye mattering noted.  Corneas are clear. Pupils are equal round and reactive to light with normal consensual eye movement.    Heart: Heart rate is regular without murmur.    Lungs: Lungs are clear to auscultation with good airflow bilaterally.    Abdomen:  Abdomen is soft, nontender.  No palpable abnormal masses or organomegaly.  Bowel sounds are normal.    GYN exam: Declined by patient, who states she has no acute pelvic or breast concerns.    Back: No areas of tenderness.    Skin: Warm and dry, with no rash noted.  Examination of back shows a slightly mobile non-tender 1.5 cm mass over upper left lumbar area, without erythema.  No open skin area or drainage " noted.    Extremities: No ankle edema noted.  No joint edema or restricted range of motion noted.    Neuro: No acute focal deficits or other abnormalities noted.    Psych: Patient is pleasant, making good eye contact, with clear and fluent speech.  Answers questions appropriately. No psychomotor agitation.           10/21/2024   Mini Cog   Clock Draw Score 2 Normal   3 Item Recall 3 objects recalled   Mini Cog Total Score 5                 Signed Electronically by: Andrés Pisano DO

## 2024-10-22 LAB
ANION GAP SERPL CALCULATED.3IONS-SCNC: 12 MMOL/L (ref 7–15)
BUN SERPL-MCNC: 16.6 MG/DL (ref 8–23)
CALCIUM SERPL-MCNC: 9.7 MG/DL (ref 8.8–10.4)
CHLORIDE SERPL-SCNC: 101 MMOL/L (ref 98–107)
CHOLEST SERPL-MCNC: 210 MG/DL
CREAT SERPL-MCNC: 0.7 MG/DL (ref 0.51–0.95)
EGFRCR SERPLBLD CKD-EPI 2021: >90 ML/MIN/1.73M2
FASTING STATUS PATIENT QL REPORTED: YES
FASTING STATUS PATIENT QL REPORTED: YES
GLUCOSE SERPL-MCNC: 78 MG/DL (ref 70–99)
HCO3 SERPL-SCNC: 28 MMOL/L (ref 22–29)
HDLC SERPL-MCNC: 86 MG/DL
LDLC SERPL CALC-MCNC: 104 MG/DL
NONHDLC SERPL-MCNC: 124 MG/DL
POTASSIUM SERPL-SCNC: 3.6 MMOL/L (ref 3.4–5.3)
SODIUM SERPL-SCNC: 141 MMOL/L (ref 135–145)
TRIGL SERPL-MCNC: 100 MG/DL

## 2024-10-29 DIAGNOSIS — G50.0 TRIGEMINAL NEURALGIA: ICD-10-CM

## 2024-10-30 RX ORDER — GABAPENTIN 300 MG/1
CAPSULE ORAL
Qty: 180 CAPSULE | Refills: 1 | Status: SHIPPED | OUTPATIENT
Start: 2024-10-30

## 2024-11-27 ENCOUNTER — OFFICE VISIT (OUTPATIENT)
Dept: URGENT CARE | Facility: URGENT CARE | Age: 69
End: 2024-11-27
Payer: COMMERCIAL

## 2024-11-27 VITALS
HEART RATE: 74 BPM | SYSTOLIC BLOOD PRESSURE: 135 MMHG | DIASTOLIC BLOOD PRESSURE: 84 MMHG | TEMPERATURE: 97.8 F | OXYGEN SATURATION: 100 %

## 2024-11-27 DIAGNOSIS — N89.8 VAGINAL DISCHARGE: ICD-10-CM

## 2024-11-27 DIAGNOSIS — R30.0 DYSURIA: ICD-10-CM

## 2024-11-27 DIAGNOSIS — N30.00 ACUTE CYSTITIS WITHOUT HEMATURIA: Primary | ICD-10-CM

## 2024-11-27 LAB
ALBUMIN UR-MCNC: 100 MG/DL
APPEARANCE UR: CLEAR
BACTERIA #/AREA URNS HPF: ABNORMAL /HPF
BILIRUB UR QL STRIP: NEGATIVE
CLUE CELLS: ABNORMAL
COLOR UR AUTO: YELLOW
GLUCOSE UR STRIP-MCNC: NEGATIVE MG/DL
HGB UR QL STRIP: ABNORMAL
KETONES UR STRIP-MCNC: NEGATIVE MG/DL
LEUKOCYTE ESTERASE UR QL STRIP: ABNORMAL
NITRATE UR QL: NEGATIVE
PH UR STRIP: 6.5 [PH] (ref 5–7)
RBC #/AREA URNS AUTO: ABNORMAL /HPF
SP GR UR STRIP: 1.02 (ref 1–1.03)
SQUAMOUS #/AREA URNS AUTO: ABNORMAL /LPF
TRICHOMONAS, WET PREP: ABNORMAL
UROBILINOGEN UR STRIP-ACNC: 0.2 E.U./DL
WBC #/AREA URNS AUTO: ABNORMAL /HPF
WBC'S/HIGH POWER FIELD, WET PREP: ABNORMAL
YEAST, WET PREP: ABNORMAL

## 2024-11-27 PROCEDURE — 99213 OFFICE O/P EST LOW 20 MIN: CPT | Performed by: FAMILY MEDICINE

## 2024-11-27 PROCEDURE — 81001 URINALYSIS AUTO W/SCOPE: CPT | Performed by: FAMILY MEDICINE

## 2024-11-27 PROCEDURE — 87210 SMEAR WET MOUNT SALINE/INK: CPT | Performed by: FAMILY MEDICINE

## 2024-11-27 RX ORDER — NITROFURANTOIN 25; 75 MG/1; MG/1
100 CAPSULE ORAL 2 TIMES DAILY
Qty: 10 CAPSULE | Refills: 0 | Status: SHIPPED | OUTPATIENT
Start: 2024-11-27 | End: 2024-12-02

## 2024-11-27 NOTE — PATIENT INSTRUCTIONS
Nitrofurantoin 100 mg twice daily for 5 days to treat your urinary infection.    Drink plenty of fluids--you're likely a little bit dehydrated after your bout of gastroenteritis (likely norovirus infection).

## 2024-11-28 NOTE — PROGRESS NOTES
ICD-10-CM    1. Acute cystitis without hematuria  N30.00 nitroFURantoin macrocrystal-monohydrate (MACROBID) 100 MG capsule      2. Dysuria  R30.0 UA Macroscopic with reflex to Microscopic and Culture - Clinic Collect     UA Microscopic with Reflex to Culture     Urine Culture      3. Vaginal discharge  N89.8 Wet prep - Clinic Collect        UA/micro consistent with UTI.  Low suspicion for pyelo at this time.  No evidence of yeast infection on wet prep.  Diarrhea could have increased enteric efe in the genital area, increasing risk for UTI.    PLAN:  Patient Instructions   Nitrofurantoin 100 mg twice daily for 5 days to treat your urinary infection.    Drink plenty of fluids--you're likely a little bit dehydrated after your bout of gastroenteritis (likely norovirus infection).    SUBJECTIVE:  Nu Taylor is a 69 year old female who presents to [facility] with dysuria, worst in the morning.  Wondering if this is related to a yeast infection, which she has had several times in the past.  No blood in urine.  No back pain but is having suprapubic discomfort and cramping.  No fevers.  Recent bout of norovirus--today was the first day she was able to keep down fluids and food.  Caught this from her grandson.    OBJECTIVE:  /84 (BP Location: Right arm, Patient Position: Sitting, Cuff Size: Adult Regular)   Pulse 74   Temp 97.8  F (36.6  C) (Tympanic)   LMP 10/22/2008 (Exact Date)   SpO2 100%   GEN: well-appearing, in NAD  Back: no CVA tenderness    Results for orders placed or performed in visit on 11/27/24   UA Macroscopic with reflex to Microscopic and Culture - Clinic Collect     Status: Abnormal    Specimen: Urine, Clean Catch   Result Value Ref Range    Color Urine Yellow Colorless, Straw, Light Yellow, Yellow    Appearance Urine Clear Clear    Glucose Urine Negative Negative mg/dL    Bilirubin Urine Negative Negative    Ketones Urine Negative Negative mg/dL    Specific Gravity Urine 1.020 1.003 -  1.035    Blood Urine Trace (A) Negative    pH Urine 6.5 5.0 - 7.0    Protein Albumin Urine 100 (A) Negative mg/dL    Urobilinogen Urine 0.2 0.2, 1.0 E.U./dL    Nitrite Urine Negative Negative    Leukocyte Esterase Urine Small (A) Negative   UA Microscopic with Reflex to Culture     Status: Abnormal   Result Value Ref Range    Bacteria Urine Few (A) None Seen /HPF    RBC Urine 0-2 0-2 /HPF /HPF    WBC Urine 25-50 (A) 0-5 /HPF /HPF    Squamous Epithelials Urine Few (A) None Seen /LPF   Wet prep - Clinic Collect     Status: Abnormal    Specimen: Vagina; Swab   Result Value Ref Range    Trichomonas Absent Absent    Yeast Absent Absent    Clue Cells Absent Absent    WBCs/high power field 2+ (A) None

## 2024-11-30 LAB — BACTERIA UR CULT: NORMAL

## 2025-02-06 ENCOUNTER — TELEPHONE (OUTPATIENT)
Dept: FAMILY MEDICINE | Facility: CLINIC | Age: 70
End: 2025-02-06
Payer: COMMERCIAL

## 2025-02-06 DIAGNOSIS — G50.0 TRIGEMINAL NEURALGIA: ICD-10-CM

## 2025-02-06 RX ORDER — CARBAMAZEPINE 200 MG/1
TABLET ORAL
Qty: 405 TABLET | Refills: 0 | Status: SHIPPED | OUTPATIENT
Start: 2025-02-06

## 2025-02-06 NOTE — TELEPHONE ENCOUNTER
S-(situation): patient requesting refill/change in prescription for tegretol     B-(background): patient has trigeminal neuralgia     A-(assessment): patient reports during the summer she's able to decrease how much tegretol she takes but does increase her prescription in the winter. About 3 weeks ago she has had to increase her dose to 2.5 tab in the morning- reports she takes about 1/2 tab at a time. She has about 2 weeks left, and it will be 'too early to fill' given prescription is for 1 tab in AM and 2 tab at night. Per patient this has been discussed at previous appointment.     R-(recommendations): PCP please advise if updated prescription can be written or if PCP would like to see patient in an appointment first to discuss further.     Summer RN 9:18 AM February 6, 2025   [facility]

## 2025-06-12 DIAGNOSIS — G50.0 TRIGEMINAL NEURALGIA: ICD-10-CM

## 2025-06-12 RX ORDER — GABAPENTIN 300 MG/1
300 CAPSULE ORAL 2 TIMES DAILY
Qty: 180 CAPSULE | Refills: 1 | OUTPATIENT
Start: 2025-06-12

## 2025-06-12 NOTE — TELEPHONE ENCOUNTER
Pt called to advise that she is going to [place] for 1 month and will need her refill of Gabapentin and Amlodipine sent to a pharmacy down there once due for it.    Pt will contact clinic once in [place] to advise which pharmacy. States she does not need refill at this time of Gabapentin. Rx denied for now.    Sameera MARCANO, RN, PHN